# Patient Record
Sex: FEMALE | Race: WHITE | Employment: PART TIME | URBAN - METROPOLITAN AREA
[De-identification: names, ages, dates, MRNs, and addresses within clinical notes are randomized per-mention and may not be internally consistent; named-entity substitution may affect disease eponyms.]

---

## 2017-03-27 ENCOUNTER — OFFICE VISIT (OUTPATIENT)
Dept: HEMATOLOGY | Age: 57
End: 2017-03-27

## 2017-03-27 VITALS
OXYGEN SATURATION: 98 % | DIASTOLIC BLOOD PRESSURE: 97 MMHG | HEART RATE: 84 BPM | TEMPERATURE: 99.6 F | BODY MASS INDEX: 27.53 KG/M2 | SYSTOLIC BLOOD PRESSURE: 142 MMHG | WEIGHT: 186.4 LBS

## 2017-03-27 DIAGNOSIS — K75.81 NASH (NONALCOHOLIC STEATOHEPATITIS): Primary | ICD-10-CM

## 2017-03-27 DIAGNOSIS — L28.2 PRURITIC RASH: ICD-10-CM

## 2017-03-27 RX ORDER — TRIAMCINOLONE ACETONIDE 1 MG/G
OINTMENT TOPICAL 2 TIMES DAILY
Qty: 80 G | Refills: 5 | Status: SHIPPED | OUTPATIENT
Start: 2017-03-27 | End: 2022-07-12

## 2017-03-27 NOTE — PROGRESS NOTES
[address] Butler MD, JOSE RAUL Richard PA-C Ralston Mates, MD, FACP, MD Batool Williamson NP [place], NP        [address] [facility], [address] [facility], [address]     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 The active problem list, all pertinent past medical history, medications, liver histology, endoscopic studies, radiologic findings and laboratory findings related to the liver disorder were reviewed with the patient. The patient is a pleasant 64 y.o.  female who was treated with minocycline for Roscaea. She developed a rash in 2013. She has been treated with prednisone on and off which was effective. In July 2015, she was slowly weaned off of prednisone and initially did well. In August 2016, her pruritus and UE rash recurred. She had prednisone at home and started taking that which calmed down her symptoms. She is currently off of prednisone but uses topical steroids to treat the affected areas. This has improved her symptoms but not completely resolved them. She was screened for a Con[employer] clinical trial utilizing a caspace inhibitor. She screened failed due to leukopenia.      She has improved her diet and is now following the Weight Watchers program. She has lost 10 lbs in the last 6 months. The patient overall feels well and voices no complaints except for the intermittent rash with pruritus described above. The patient completes all daily activities without any functional limitations. The patient has not experienced fatigue, arthralgias, myalgias, problems concentrating, swelling of the abdomen, swelling of the lower extremities, hematemesis, and hematochezia. ALLERGIES  Allergies   Allergen Reactions    Codeine Nausea and Vomiting    Sulfa (Sulfonamide Antibiotics) Rash     MEDICATIONS  No current outpatient prescriptions on file. No current facility-administered medications for this visit. REVIEW OF SYSTEMS:  Systems related to all organ systems were reviewed. All were negative except as noted above. FAMILY HISTORY:  The father  of cirrhosis. Unclear etiology. The mother  of heart disease. There is no other family history of liver disease. Aunts and uncleas with features of metabolic syndrome. SOCIAL HISTORY:  The patient is . The patient has 2 children and 3 grandchildren. The patient stopped using tobacco products in . The patient consumes 1-2 alcoholic beverages per 1-2 days. She has consumed minimal alcohol since 2013. The patient currently works full time . PHYSICAL EXAMINATION:  Visit Vitals    BP (!) 142/97    Pulse 84    Temp 99.6 °F (37.6 °C) (Tympanic)    Wt 186 lb 6.4 oz (84.6 kg)    SpO2 98%    BMI 27.53 kg/m2     General: No acute distress. Eyes: Sclera anicteric. ENT: No oral lesions. Thyroid normal.  Nodes: No adenopathy. Skin: No spider angiomata. No jaundice. No palmar erythema. Respiratory: Lungs clear to auscultation. Cardiovascular: Regular heart rate. No murmurs. No JVD. Abdomen: Soft non-tender. Liver size normal to percussion/palpation. Spleen not palpable. No obvious ascites. Extremities: No edema.   No muscle wasting. No gross arthritic changes. Neurologic: Alert and oriented. Cranial nerves grossly intact. No asterixsis. LABORATORY STUDIES:  [facility] [place] of [address] [place] [address] & Units 3/27/2017 9/29/2016 7/28/2015   WBC 3.4 - 10.8 x10E3/uL  2.6 (L) 3.2 (L)   ANC 1.4 - 7.0 x10E3/uL      HGB 11.1 - 15.9 g/dL  13.8 14.3    - 379 x10E3/uL  99 (LL) 131 (L)   INR 0.8 - 1.2  1.1    AST 0 - 40 IU/L 117 (H) 173 (H) 89 (H)   ALT 0 - 32 IU/L 125 (H) 220 (H) 129 (H)   Alk Phos 39 - 117 IU/L 106 66 75   Bili, Total 0.0 - 1.2 mg/dL 1.0 0.8 0.7   Bili, Direct 0.00 - 0.40 mg/dL   0.22   Albumin 3.5 - 5.5 g/dL 4.4 4.3 4.4   BUN 6 - 24 mg/dL 11 10 17   Creat 0.57 - 1.00 mg/dL 0.89 0.84 0.96   Na 134 - 144 mmol/L 140 141 144   K 3.5 - 5.2 mmol/L 3.8 3.6 3.7   Cl 96 - 106 mmol/L 97 98 102   CO2 18 - 29 mmol/L 23 26 26   Glucose 65 - 99 mg/dL 97 118 (H) 102 (H)     SEROLOGIES:  5/2013. ceruloplsmin 25, alpha-1-antitrypsin 145. Serologies Latest Ref Rng 10/2/2014   Ferritin 15 - 150 ng/mL 527 (H)   Iron % Saturation 15 - 55 % 25   RAJ, IFA  Negative   ASMCA 0 - 19 Units 9     LIVER HISTOLOGY:  5/2013. Slides reviewed by MLS. WILD with severe inflammation and stage 2 fibrosis. The pattern of the inflammation is not consistent with AIH.  11/2014. Slides reviewed by MLS. WILD with portal, septal and pericellular fibrosis. ENDOSCOPIC PROCEDURES:  Not available or performed    RADIOLOGY:  Not available or performed    OTHER TESTING:  Not available or performed    ASSESSMENT AND PLAN:  WILD with stage 2 fibrosis. Persistent elevation in liver transaminases, secondary to WILD. Liver function is normal. Discussed with patient participation in one of our clinical trials that is currently enrolling for WILD patients without cirrhosis. She would be a good candidate for this. She is interested. Her name will be given to our research team, consent was given today for Jay. Will continue to follow regularly. The patient was counseled regarding diet and exercise to achieve weight loss. She is now participating in [employer] and this has been effective. Encouraged patient to continue this program.     Rash with pruritus. Prescribed triamcinolone ointment to be taken as needed for UE rash. Discussed with patient that this is most likely NOT related to her fatty liver disease. The patient was directed to continue all current medications at the current dosages. There are no contraindications for the patient to take any medications that are necessary for treatment of other medical issues. The patient was counseled regarding alcohol consumption.     Follow-up Jamaal Sanchez 32 in 6 months or enrollment in a WILD clinical trial.    April Byron Suarez NP  Liver [place] [address]  Ph: [phone]  Fax: [phone]

## 2017-03-27 NOTE — MR AVS SNAPSHOT
Visit Information Date & Time Provider Department Dept. Phone Encounter #  
 3/27/2017 11:00 AM April GISELA Ray NP [facility] [address] [place] [address] [id number] Follow-up Instructions Return in about 6 months (around 9/27/2017). Upcoming Health Maintenance Date Due Hepatitis C Screening 1960 DTaP/Tdap/Td series (1 - Tdap) 6/29/1981 PAP AKA CERVICAL CYTOLOGY 6/29/1981 BREAST CANCER SCRN MAMMOGRAM 6/29/2010 FOBT Q 1 YEAR AGE 50-75 6/29/2010 INFLUENZA AGE 9 TO ADULT 8/1/2016 Allergies as of 3/27/2017  Review Complete On: 3/27/2017 By: April Kala Rothman NP Severity Noted Reaction Type Reactions Codeine  10/02/2014    Nausea and Vomiting  
 Sulfa (Sulfonamide Antibiotics)  10/02/2014    Rash Current Immunizations  Never Reviewed No immunizations on file. Not reviewed this visit You Were Diagnosed With   
  
 Codes Comments WILD (nonalcoholic steatohepatitis)    -  Primary ICD-10-CM: C58.68 ICD-9-CM: 571.8 Vitals BP Pulse Temp Weight(growth percentile) SpO2 BMI  
 (!) 142/97 84 99.6 °F (37.6 °C) (Tympanic) 186 lb 6.4 oz (84.6 kg) 98% 27.53 kg/m2 OB Status Smoking Status Menopause Never Smoker BMI and BSA Data Body Mass Index Body Surface Area  
 27.53 kg/m 2 2.03 m 2 Preferred Pharmacy Pharmacy Name Phone [address] [phone] Your Updated Medication List  
  
   
This list is accurate as of: 3/27/17 11:32 AM.  Always use your most recent med list.  
  
  
  
  
 triamcinolone acetonide 0.1 % ointment Commonly known as:  KENALOG Apply  to affected area two (2) times a day. use thin layer Prescriptions Sent to Pharmacy Refills  
 triamcinolone acetonide (KENALOG) 0.1 % ointment 5 Sig: Apply  to affected area two (2) times a day. use thin layer  Class: Normal  
 Pharmacy: [address] #: [phone] Route: Topical  
  
We Performed the Following CBC W/O DIFF [57429 CPT(R)] METABOLIC PANEL, COMPREHENSIVE [54951 CPT(R)] Follow-up Instructions Return in about 6 months (around 9/27/2017). Introducing [facility] HEALTH SERVICES! Dear Leatha: Thank you for requesting a ArchiveSocial account. Our records indicate that you already have an active ArchiveSocial account. You can access your account anytime at [email or web address] Did you know that you can access your hospital and ER discharge instructions at any time in ArchiveSocial? You can also review all of your test results from your hospital stay or ER visit. Additional Information If you have questions, please visit the Frequently Asked Questions section of the ArchiveSocial website at [email or web address] Remember, ArchiveSocial is NOT to be used for urgent needs. For medical emergencies, dial 911. Now available from your iPhone and Android! Please provide this summary of care documentation to your next provider. Your primary care clinician is listed as Tiana Benson. If you have any questions after today's visit, please call [phone].

## 2017-03-28 LAB
ALBUMIN SERPL-MCNC: 4.4 G/DL (ref 3.5–5.5)
ALBUMIN/GLOB SERPL: 1.5 {RATIO} (ref 1.2–2.2)
ALP SERPL-CCNC: 106 IU/L (ref 39–117)
ALT SERPL-CCNC: 125 IU/L (ref 0–32)
AST SERPL-CCNC: 117 IU/L (ref 0–40)
BILIRUB SERPL-MCNC: 1 MG/DL (ref 0–1.2)
BUN SERPL-MCNC: 11 MG/DL (ref 6–24)
BUN/CREAT SERPL: 12 (ref 9–23)
CALCIUM SERPL-MCNC: 9.4 MG/DL (ref 8.7–10.2)
CHLORIDE SERPL-SCNC: 97 MMOL/L (ref 96–106)
CO2 SERPL-SCNC: 23 MMOL/L (ref 18–29)
CREAT SERPL-MCNC: 0.89 MG/DL (ref 0.57–1)
ERYTHROCYTE [DISTWIDTH] IN BLOOD BY AUTOMATED COUNT: 14.1 % (ref 12.3–15.4)
GLOBULIN SER CALC-MCNC: 2.9 G/DL (ref 1.5–4.5)
GLUCOSE SERPL-MCNC: 97 MG/DL (ref 65–99)
HCT VFR BLD AUTO: 45.6 % (ref 34–46.6)
HGB BLD-MCNC: 15.3 G/DL (ref 11.1–15.9)
MCH RBC QN AUTO: 32.8 PG (ref 26.6–33)
MCHC RBC AUTO-ENTMCNC: 33.6 G/DL (ref 31.5–35.7)
MCV RBC AUTO: 98 FL (ref 79–97)
MORPHOLOGY BLD-IMP: ABNORMAL
NRBC BLD AUTO-RTO: 0 %
PLATELET # BLD AUTO: 93 X10E3/UL (ref 150–379)
POTASSIUM SERPL-SCNC: 3.8 MMOL/L (ref 3.5–5.2)
PROT SERPL-MCNC: 7.3 G/DL (ref 6–8.5)
RBC # BLD AUTO: 4.66 X10E6/UL (ref 3.77–5.28)
SODIUM SERPL-SCNC: 140 MMOL/L (ref 134–144)
WBC # BLD AUTO: 1.8 X10E3/UL (ref 3.4–10.8)

## 2017-09-25 ENCOUNTER — OFFICE VISIT (OUTPATIENT)
Dept: HEMATOLOGY | Age: 57
End: 2017-09-25

## 2017-09-25 VITALS
OXYGEN SATURATION: 99 % | SYSTOLIC BLOOD PRESSURE: 134 MMHG | WEIGHT: 182.6 LBS | TEMPERATURE: 98.1 F | DIASTOLIC BLOOD PRESSURE: 78 MMHG | BODY MASS INDEX: 26.97 KG/M2 | HEART RATE: 79 BPM

## 2017-09-25 DIAGNOSIS — K75.81 NASH (NONALCOHOLIC STEATOHEPATITIS): Primary | ICD-10-CM

## 2017-09-25 RX ORDER — TRIAMCINOLONE ACETONIDE 1 MG/G
CREAM TOPICAL 2 TIMES DAILY
Qty: 80 G | Refills: 3 | Status: SHIPPED | OUTPATIENT
Start: 2017-09-25 | End: 2022-01-27 | Stop reason: SDUPTHER

## 2017-09-25 NOTE — MR AVS SNAPSHOT
Visit Information Date & Time Provider Department Dept. Phone Encounter #  
 9/25/2017  9:30 AM April G Huyen Ahuja, [facility] 219 [id number] Upcoming Health Maintenance Date Due Hepatitis C Screening 1960 DTaP/Tdap/Td series (1 - Tdap) 6/29/1981 PAP AKA CERVICAL CYTOLOGY 6/29/1981 BREAST CANCER SCRN MAMMOGRAM 6/29/2010 FOBT Q 1 YEAR AGE 50-75 6/29/2010 INFLUENZA AGE 9 TO ADULT 8/1/2017 Allergies as of 9/25/2017  Review Complete On: 9/25/2017 By: Chris Amezcua Severity Noted Reaction Type Reactions Codeine  10/02/2014    Nausea and Vomiting  
 Sulfa (Sulfonamide Antibiotics)  10/02/2014    Rash Current Immunizations  Never Reviewed No immunizations on file. Not reviewed this visit You Were Diagnosed With   
  
 Codes Comments WILD (nonalcoholic steatohepatitis)    -  Primary ICD-10-CM: N41.09 ICD-9-CM: 571.8 Vitals BP Pulse Temp Weight(growth percentile) SpO2 BMI  
 134/78 79 98.1 °F (36.7 °C) (Tympanic) 182 lb 9.6 oz (82.8 kg) 99% 26.97 kg/m2 OB Status Smoking Status Menopause Never Smoker BMI and BSA Data Body Mass Index Body Surface Area  
 26.97 kg/m 2 2.01 m 2 Preferred Pharmacy Pharmacy Name Phone [address] [phone] Your Updated Medication List  
  
   
This list is accurate as of: 9/25/17 10:09 AM.  Always use your most recent med list.  
  
  
  
  
 * triamcinolone acetonide 0.1 % ointment Commonly known as:  KENALOG Apply  to affected area two (2) times a day. use thin layer * triamcinolone acetonide 0.1 % topical cream  
Commonly known as:  KENALOG Apply  to affected area two (2) times a day. use thin layer * Notice: This list has 2 medication(s) that are the same as other medications prescribed for you.  Read the directions carefully, and ask your doctor or other care provider to review them with you. Prescriptions Sent to Pharmacy Refills  
 triamcinolone acetonide (KENALOG) 0.1 % topical cream 3 Sig: Apply  to affected area two (2) times a day. use thin layer Class: Normal  
 Pharmacy: [address] #: [phone] Route: Topical  
  
We Performed the Following CBC W/O DIFF [58276 CPT(R)] METABOLIC PANEL, COMPREHENSIVE [48534 CPT(R)] To-Do List   
 09/25/2017 Imaging:  [facility] SERVICES! Dear Merryl Kawasaki: Thank you for requesting a Switchcam account. Our records indicate that you already have an active Switchcam account. You can access your account anytime at [email or web address] Did you know that you can access your hospital and ER discharge instructions at any time in Switchcam? You can also review all of your test results from your hospital stay or ER visit. Additional Information If you have questions, please visit the Frequently Asked Questions section of the Switchcam website at [email or web address] Remember, Switchcam is NOT to be used for urgent needs. For medical emergencies, dial 911. Now available from your iPhone and Android! Please provide this summary of care documentation to your next provider. Your primary care clinician is listed as Bambi Massey. If you have any questions after today's visit, please call [phone].

## 2017-09-26 LAB
ALBUMIN SERPL-MCNC: 4.3 G/DL (ref 3.5–5.5)
ALBUMIN/GLOB SERPL: 1.3 {RATIO} (ref 1.2–2.2)
ALP SERPL-CCNC: 103 IU/L (ref 39–117)
ALT SERPL-CCNC: 115 IU/L (ref 0–32)
AST SERPL-CCNC: 120 IU/L (ref 0–40)
BILIRUB SERPL-MCNC: 1.1 MG/DL (ref 0–1.2)
BUN SERPL-MCNC: 11 MG/DL (ref 6–24)
BUN/CREAT SERPL: 11 (ref 9–23)
CALCIUM SERPL-MCNC: 9.3 MG/DL (ref 8.7–10.2)
CHLORIDE SERPL-SCNC: 98 MMOL/L (ref 96–106)
CO2 SERPL-SCNC: 27 MMOL/L (ref 18–29)
CREAT SERPL-MCNC: 0.96 MG/DL (ref 0.57–1)
ERYTHROCYTE [DISTWIDTH] IN BLOOD BY AUTOMATED COUNT: 14.1 % (ref 12.3–15.4)
GLOBULIN SER CALC-MCNC: 3.3 G/DL (ref 1.5–4.5)
GLUCOSE SERPL-MCNC: 113 MG/DL (ref 65–99)
HCT VFR BLD AUTO: 42.2 % (ref 34–46.6)
HGB BLD-MCNC: 14.2 G/DL (ref 11.1–15.9)
MCH RBC QN AUTO: 32.6 PG (ref 26.6–33)
MCHC RBC AUTO-ENTMCNC: 33.6 G/DL (ref 31.5–35.7)
MCV RBC AUTO: 97 FL (ref 79–97)
PLATELET # BLD AUTO: 101 X10E3/UL (ref 150–379)
POTASSIUM SERPL-SCNC: 3.8 MMOL/L (ref 3.5–5.2)
PROT SERPL-MCNC: 7.6 G/DL (ref 6–8.5)
RBC # BLD AUTO: 4.35 X10E6/UL (ref 3.77–5.28)
SODIUM SERPL-SCNC: 140 MMOL/L (ref 134–144)
WBC # BLD AUTO: 1.6 X10E3/UL (ref 3.4–10.8)

## 2017-09-27 NOTE — PROGRESS NOTES
[address] Sher[address] Varma MD, [facility], [place], [place]       Crystal Elio, NP    TUNDE Seymour ACNP-BC   JOSE RAUL Larson NP        at [address], [address] [facility]     [facility], [address]     [phone]     FAX: [phone]    at [address], [address] [place] [address], [address]     [phone]     FAX: [phone]     Patient Care Team:  Deneen Reno MD as PCP - General (Family Practice)  Octavio Pak MD (Gastroenterology)  Magdy Marino MD (Hepatology)    Patient Active Problem List   Diagnosis Code    WILD (nonalcoholic steatohepatitis) K75.81    H/O cervical spine surgery Z98.890    H/O arthroscopic knee surgery Z98.890       Deja Segura [facility] to the [facility] for management of 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 The active problem list, all pertinent past medical history, medications, liver histology, endoscopic studies, radiologic findings and laboratory findings related to the liver disorder were reviewed with the patient. The patient is a pleasant 62 y.o.  female who was treated with minocycline for Roscaea. She developed a rash in 2013. She has been treated with prednisone on and off which was effective. In July 2015, she was slowly weaned off of prednisone and initially did well. In August 2016, her pruritus and UE rash recurred. She had prednisone at home and started taking that which calmed down her symptoms. She is currently off of prednisone but uses topical steroids to treat the affected areas (scalp, arms). This controls her symptoms for the most part. She was screened for a Conatus clinical trial utilizing a caspace inhibitor. She screened failed due to leukopenia.      She has improved her diet and is now following the Weight Watchers program and low carb diet. She continues to lose weight over time. The patient overall feels well and voices no complaints except for the intermittent rash with pruritus described above. The patient completes all daily activities without any functional limitations. The patient has not experienced fatigue, arthralgias, myalgias, problems concentrating, swelling of the abdomen, swelling of the lower extremities, hematemesis, and hematochezia. ALLERGIES  Allergies   Allergen Reactions    Codeine Nausea and Vomiting    Sulfa (Sulfonamide Antibiotics) Rash     MEDICATIONS  Current Outpatient Prescriptions   Medication Sig    triamcinolone acetonide (KENALOG) 0.1 % topical cream Apply  to affected area two (2) times a day. use thin layer    triamcinolone acetonide (KENALOG) 0.1 % ointment Apply  to affected area two (2) times a day. use thin layer     No current facility-administered medications for this visit. REVIEW OF SYSTEMS:  Systems related to all organ systems were reviewed. All were negative except as noted above. FAMILY HISTORY:  The father  of cirrhosis. Unclear etiology. The mother  of heart disease. There is no other family history of liver disease. Aunts and uncleas with features of metabolic syndrome. SOCIAL HISTORY:  The patient is . The patient has 2 children and 3 grandchildren. The patient stopped using tobacco products in . The patient consumes 1-2 alcoholic beverages weekly. She has consumed minimal alcohol since 2013. The patient currently works full time . PHYSICAL EXAMINATION:  Visit Vitals    /78    Pulse 79    Temp 98.1 °F (36.7 °C) (Tympanic)    Wt 182 lb 9.6 oz (82.8 kg)    SpO2 99%    BMI 26.97 kg/m2     General: No acute distress. Eyes: Sclera anicteric. ENT: No oral lesions. Thyroid normal.  Nodes: No adenopathy. Skin: No spider angiomata. No jaundice. No palmar erythema.   Respiratory: Lungs clear to auscultation. Cardiovascular: Regular heart rate. No murmurs. No JVD. Abdomen: Soft non-tender. Liver size normal to percussion/palpation. Spleen not palpable. No obvious ascites. Extremities: No edema. No muscle wasting. No gross arthritic changes. Neurologic: Alert and oriented. Cranial nerves grossly intact. No asterixsis. LABORATORY STUDIES:  Liver [place] of [address] [place] [address] & Units 9/25/2017 3/27/2017 9/29/2016   WBC 3.4 - 10.8 x10E3/uL 1.6 (LL) 1.8 (LL) 2.6 (L)   ANC 1.4 - 7.0 x10E3/uL      HGB 11.1 - 15.9 g/dL 14.2 15.3 13.8    - 379 x10E3/uL 101 (L) 93 (LL) 99 (LL)   INR 0.8 - 1.2   1.1   AST 0 - 40 IU/L 120 (H) 117 (H) 173 (H)   ALT 0 - 32 IU/L 115 (H) 125 (H) 220 (H)   Alk Phos 39 - 117 IU/L 103 106 66   Bili, Total 0.0 - 1.2 mg/dL 1.1 1.0 0.8   Bili, Direct 0.00 - 0.40 mg/dL      Albumin 3.5 - 5.5 g/dL 4.3 4.4 4.3   BUN 6 - 24 mg/dL 11 11 10   Creat 0.57 - 1.00 mg/dL 0.96 0.89 0.84   Na 134 - 144 mmol/L 140 140 141   K 3.5 - 5.2 mmol/L 3.8 3.8 3.6   Cl 96 - 106 mmol/L 98 97 98   CO2 18 - 29 mmol/L 27 23 26   Glucose 65 - 99 mg/dL 113 (H) 97 118 (H)     SEROLOGIES:  5/2013. ceruloplsmin 25, alpha-1-antitrypsin 145. Serologies Latest Ref Rng 10/2/2014   Ferritin 15 - 150 ng/mL 527 (H)   Iron % Saturation 15 - 55 % 25   RAJ, IFA  Negative   ASMCA 0 - 19 Units 9     LIVER HISTOLOGY:  5/2013. Slides reviewed by MLS. WILD with severe inflammation and stage 2 fibrosis. The pattern of the inflammation is not consistent with AIH.  11/2014. Slides reviewed by GREGORIO. WILD with portal, septal and pericellular fibrosis. ENDOSCOPIC PROCEDURES:  Not available or performed    RADIOLOGY:  Not available or performed    OTHER TESTING:  Not available or performed    ASSESSMENT AND PLAN:  WILD with stage 2 fibrosis. Persistent elevation in liver transaminases, secondary to WILD.  Liver function is normal. Discussed with patient participation in one of our clinical trials that is currently enrolling for WILD patients without cirrhosis. She would be a good candidate for this. She is interested. Her name will be given to our research team. Will continue to follow regularly. The patient was counseled regarding diet and exercise to achieve weight loss. She is now participating in [employer] and this has been effective. Encouraged patient to continue this program.     Rash with pruritus. Prescribed triamcinolone ointment and cream to be taken as needed for UE and scalp rash. Discussed with patient that this is most likely NOT related to her fatty liver disease. The patient was directed to continue all current medications at the current dosages. There are no contraindications for the patient to take any medications that are necessary for treatment of other medical issues. The patient was counseled regarding alcohol consumption.     Follow-up Jamaal Sanchez 32 in 6 months or enrollment in a WILD clinical trial.    April Charlie Kaplan NP  Liver [place] [address]  Ph: [phone]  Fax: [phone]

## 2017-10-02 ENCOUNTER — HOSPITAL ENCOUNTER (OUTPATIENT)
Dept: ULTRASOUND IMAGING | Age: 57
Discharge: HOME | End: 2017-10-02
Payer: COMMERCIAL

## 2017-10-02 DIAGNOSIS — K75.81 NASH (NONALCOHOLIC STEATOHEPATITIS): ICD-10-CM

## 2017-10-02 PROCEDURE — 76705 ECHO EXAM OF ABDOMEN: CPT

## 2020-02-14 ENCOUNTER — DOCUMENTATION ONLY (OUTPATIENT)
Dept: HEMATOLOGY | Age: 60
End: 2020-02-14

## 2020-02-14 NOTE — PROGRESS NOTES
Chart reviewed today for possible consideration in WILD clinical trials. Pt's last visit in the office was 9/25/2017. Voicemail left on patient's mobile number requesting return call to discuss opportunities in clinical trials. Awaiting a return call.

## 2022-01-27 ENCOUNTER — OFFICE VISIT (OUTPATIENT)
Dept: HEMATOLOGY | Age: 62
End: 2022-01-27
Payer: OTHER GOVERNMENT

## 2022-01-27 VITALS
WEIGHT: 199.8 LBS | OXYGEN SATURATION: 97 % | HEART RATE: 58 BPM | BODY MASS INDEX: 39.23 KG/M2 | HEIGHT: 60 IN | SYSTOLIC BLOOD PRESSURE: 152 MMHG | DIASTOLIC BLOOD PRESSURE: 82 MMHG

## 2022-01-27 DIAGNOSIS — K75.81 NASH (NONALCOHOLIC STEATOHEPATITIS): Primary | ICD-10-CM

## 2022-01-27 PROCEDURE — 91200 LIVER ELASTOGRAPHY: CPT | Performed by: PHYSICIAN ASSISTANT

## 2022-01-27 PROCEDURE — 99204 OFFICE O/P NEW MOD 45 MIN: CPT | Performed by: PHYSICIAN ASSISTANT

## 2022-01-27 NOTE — PROGRESS NOTES
Anahi Sorto is a 64 y.o. female  Chief Complaint   Patient presents with    New Patient     low platlets  having knee surgery next month

## 2022-01-27 NOTE — PROGRESS NOTES
[facility], See LEBLANC, Josh Simpson, WyTUNDE Junior, [facility]     April S Rodrigo, [facility]   KATI Lora-ADI Burrell, [facility]       Rociotobias Sotelo Myke De Tao 136    at [address], [facility] Collin Lopez  22.    [phone]    FAX: [address] [place] [address] Drive, [address] [place] [address] Street, [address]    [phone]    FAX: [phone]       Patient Care Team:  Phillip Neely MD as PCP - General (Family Medicine)  Andra Paz MD (Gastroenterology)  Yasir Nieto MD (Hepatology)    Patient Active Problem List   Diagnosis Code    WILD (nonalcoholic steatohepatitis) K75.81    H/O cervical spine surgery Z98.890    H/O arthroscopic knee surgery Z98.890       Zakiya Kitchen returns to the [address] for management of 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 The active problem list, all pertinent past medical history, medications, liver histology, endoscopic studies, radiologic findings and laboratory findings related to the liver disorder were reviewed with the patient. This is the patient's first return office visit in 4 years. She had a change in insurance that prevented her from maintaining follow-up in this office for several years. The patient is a pleasant 64 y.o.  female with a long-standing history of elevated liver enzymes thought to be due to WILD. This was assessed with liver biopsy in 2014 and deemed stage 2 at that time. She originally presented due to wide-spread steroid responsive rash that was thought to be related to liver condition.  She had seem rheumatology at that the time of presentation and no underlying systemic etiology of the rash was identified, she has been treating this with topical steroid creams. She has had no dermatologic evaluation. She was screened for potential inclusion in WILD/Conatus clinical trial utilizing a caspace inhibitor. She screened failed due to leukopenia - this has been a long-standing finding. She returns now as she is being considered for knee replacement in mid 2022 with Dr Raudel Reddy. Ortho has asked for updated assessment of liver condition as her pre-op labs had shown pancytopenia. She has not had evaluation with hematology in the past.     She has intermittently followed Weight Watchers program and low carb diet. Her weight has fluctuated within 20#. She has had increased weight gainin the past 2-3 years that she has attributed to her mobility issues and COVID restrictions. The patient overall feels well and voices no complaints except for the intermittent rash with pruritus and joint pains as described above. The patient completes all daily activities with mild functional limitations. The patient has not experienced fatigue, arthralgias, myalgias, problems concentrating, swelling of the abdomen, swelling of the lower extremities, hematemesis, and hematochezia. ALLERGIES  Allergies   Allergen Reactions    Codeine Nausea and Vomiting    Sulfa (Sulfonamide Antibiotics) Rash     MEDICATIONS  Current Outpatient Medications   Medication Sig    triamcinolone acetonide (KENALOG) 0.1 % topical cream Apply  to affected area two (2) times a day. use thin layer    triamcinolone acetonide (KENALOG) 0.1 % ointment Apply  to affected area two (2) times a day. use thin layer     No current facility-administered medications for this visit. REVIEW OF SYSTEMS:  Systems related to all organ systems were reviewed. All were negative except as noted above. FAMILY HISTORY:  The father  of cirrhosis. Unclear etiology. The mother  of heart disease.     Aunts and uncles with features of metabolic syndrome. SOCIAL HISTORY:  The patient is . The patient has 2 children and 3 grandchildren. The patient stopped using tobacco products in 2003. The patient consumes 1-2 alcoholic beverages weekly. She has consumed minimal alcohol since 6/2013. The patient currently works full time . PHYSICAL EXAMINATION:  Visit Vitals  BP (!) 152/82 (BP 1 Location: Left upper arm, BP Patient Position: Sitting, BP Cuff Size: Adult)   Pulse (!) 58   Ht 5' (1.524 m)   Wt 199 lb 12.8 oz (90.6 kg)   SpO2 97%   BMI 39.02 kg/m²     General: No acute distress. Walks with some difficulty  Eyes: Sclera anicteric. ENT: No oral lesions. Thyroid normal.  Nodes: No adenopathy. Skin: No spider angiomata. No jaundice. No palmar erythema. Erythematous paques-like lesion on overlying left tibia. Involvement of elbows. Respiratory: Lungs clear to auscultation. Cardiovascular: Regular heart rate. No murmurs. No JVD. Abdomen: Soft non-tender. Liver size normal to percussion/palpation. Spleen not palpable. No obvious ascites. Extremities: No edema. No muscle wasting. Significant for right knee swelling. Neurologic: Alert and oriented. Cranial nerves grossly intact. No asterixsis.     LABORATORY STUDIES:  Liver [place] of [address] & Units 1/27/2022 9/25/2017   WBC 3.6 - 11.0 K/uL 2.5 (L) 1.6 (LL)   ANC 1.8 - 8.0 K/UL 1.2 (L)    HGB 11.5 - 16.0 g/dL 11.4 (L) 14.2    - 400 K/uL 85 (L) 101 (L)   INR 0.9 - 1.1   1.2 (H)    AST 15 - 37 U/L 85 (H) 120 (H)   ALT 12 - 78 U/L 73 115 (H)   Alk Phos 45 - 117 U/L 130 (H) 103   Bili, Total 0.2 - 1.0 MG/DL 0.8 1.1   Bili, Direct 0.0 - 0.2 MG/DL 0.3 (H)    Albumin 3.5 - 5.0 g/dL 4.0 4.3   BUN 6 - 20 MG/DL 14 11   Creat 0.55 - 1.02 MG/DL 0.88 0.96   Na 136 - 145 mmol/L 139 140   K 3.5 - 5.1 mmol/L 3.4 (L) 3.8   Cl 97 - 108 mmol/L 107 98   CO2 21 - 32 mmol/L 25 27   Glucose 65 - 100 mg/dL 96 113 (H)     Liver [place] [facility] Latest Ref Rng & Units 3/27/2017   WBC 3.6 - 11.0 K/uL 1.8 (LL)   ANC 1.8 - 8.0 K/UL    HGB 11.5 - 16.0 g/dL 15.3    - 400 K/uL 93 (LL)   INR 0.9 - 1.1      AST 15 - 37 U/L 117 (H)   ALT 12 - 78 U/L 125 (H)   Alk Phos 45 - 117 U/L 106   Bili, Total 0.2 - 1.0 MG/DL 1.0   Bili, Direct 0.0 - 0.2 MG/DL    Albumin 3.5 - 5.0 g/dL 4.4   BUN 6 - 20 MG/DL 11   Creat 0.55 - 1.02 MG/DL 0.89   Na 136 - 145 mmol/L 140   K 3.5 - 5.1 mmol/L 3.8   Cl 97 - 108 mmol/L 97   CO2 21 - 32 mmol/L 23   Glucose 65 - 100 mg/dL 97   Additional lab values drawn at today's office visit are pending at the time of documentation. SEROLOGIES:  Serologies Latest Ref Rng & Units 1/27/2022   Ferritin 8 - 252 NG/ML 16   Iron % Saturation 20 - 50 % 7 (L)   5/2013. ceruloplasmin 25, alpha-1-antitrypsin 145. Serologies Latest Ref Rng 10/2/2014   Ferritin 15 - 150 ng/mL 527 (H)   Iron % Saturation 15 - 55 % 25   RAJ, IFA  Negative   ASMCA 0 - 19 Units 9     LIVER HISTOLOGY:  5/2013. Slides reviewed by MLS. WILD with severe inflammation and stage 2 fibrosis. The pattern of the inflammation is not consistent with AIH.  11/2014. Slides reviewed by MLS. WILD with portal, septal and pericellular fibrosis. 1/2022. FibroScan performed at [address]. EkPa was 30.2. Suggested fibrosis level is F4. CAP score is 240. ENDOSCOPIC PROCEDURES:  Not available or performed    RADIOLOGY:  10/2017. Ultrasound of liver. Mildly increased liver echogenicity, otherwise normal ultrasound examination of the right upper quadrant. OTHER TESTING:  Not available or performed    ASSESSMENT AND PLAN:  WILD with probable cirrhosis. I have reviewed with the patient the results of the current Fibroscan and our concern that she likely has had progression to cirrhosis. This is likely due to WILD given the past evaluation negative for other etiologies of liver disease, the presence of steatosis on prior biopsies, and her persistent weight gain. I have reviewed her presentation with Dr Bia Carroll and he feels that liver biopsy would not change our management strategy at this time. Will proceed with screenings for complications of cirrhosis. The most recent laboratory studies indicate that the liver transaminases are elevated, alkaline phosphatase is elevated, total bilirubin is normal, INR is elevated, albumin is normal, and the platelet count is depressed. AFP is pending at this time. I have reviewed with the patient that we will need to assess for complications of cirrhosis. This will include ultrasound to assess for liver cancer and signs of portal hypertension. This has been ordered. I have also recommended that she have EGD to assess for varices or sources of GI bleeding. She is noted to have iron deficiency anemia. The patient was counseled regarding diet and exercise to achieve weight loss. She is recommitting to Weight Watchers, I have encouraged her in this regard. Rash with pruritus. The etiology of the skin changes is not clear. I have for now renewed prescription for triamcinolone cream to be taken as needed. We will want to connect her with dermatology for a more formal assessment of this steroid-responsive rash. Pancytopenia  She has had long-standing leukopenia dating back to at least 2014 and has not had prior heme evaluation. This should be considered. The patient's thrombocytopenia is likely due to splenic sequestration from portal hypertension related to her cirrhosis. I am concerned that she may have upper Gi blood losses from portal gastropathy and/or NSAIDs use as she is iron deficient. Will recommend oral iron supplementation at this time and investigate with EGD. Orthopedic surgery  I have reviewed with the patient my concerns that her liver disease is more advanced than previously believed and that there are unique concerns for surgical complications in patients with cirrhosis.  From her labs in office, she is determined to have Kimberly A cirrhosis, score 5, and a MELD score of under 10. The risk of complications including hepatic decompensation is about 20-30%. Operative mortality risk is under 5%. The ultimate decision regarding whether or not to proceed with surgery will depend upon conversations between the surgeon and patient/and/or family and careful assessment of the risk and benefit of the surgical procedure. We would like to proceed with assessments of US and EGD as promptly as possible, these may not be done in time prior to her scheduled knee surgery 2/17/22. Her surgeon may want to discuss deferring surgery. The patient was directed to continue all current medications at the current dosages and to limit her intake of NSAIDs as much as possible. The patient was counseled regarding alcohol consumption. [address] [place] [address] in 3 months for follow-up with US and EGD in the meantime. Documentation reviewed and updated to reflect current, accurate patient information.     Madie Riddle PA-C  Liver [place] [facility] 59, [facility] 22.  [phone]  [address] [place] [address]

## 2022-01-28 DIAGNOSIS — K75.81 NASH (NONALCOHOLIC STEATOHEPATITIS): Primary | ICD-10-CM

## 2022-01-28 LAB
ALBUMIN SERPL-MCNC: 4 G/DL (ref 3.5–5)
ALBUMIN/GLOB SERPL: 1.1 {RATIO} (ref 1.1–2.2)
ALP SERPL-CCNC: 130 U/L (ref 45–117)
ALT SERPL-CCNC: 73 U/L (ref 12–78)
ANION GAP SERPL CALC-SCNC: 7 MMOL/L (ref 5–15)
AST SERPL-CCNC: 85 U/L (ref 15–37)
BASOPHILS # BLD: 0 K/UL (ref 0–0.1)
BASOPHILS NFR BLD: 1 % (ref 0–1)
BILIRUB DIRECT SERPL-MCNC: 0.3 MG/DL (ref 0–0.2)
BILIRUB SERPL-MCNC: 0.8 MG/DL (ref 0.2–1)
BUN SERPL-MCNC: 14 MG/DL (ref 6–20)
BUN/CREAT SERPL: 16 (ref 12–20)
CALCIUM SERPL-MCNC: 9.5 MG/DL (ref 8.5–10.1)
CHLORIDE SERPL-SCNC: 107 MMOL/L (ref 97–108)
CO2 SERPL-SCNC: 25 MMOL/L (ref 21–32)
CREAT SERPL-MCNC: 0.88 MG/DL (ref 0.55–1.02)
DIFFERENTIAL METHOD BLD: ABNORMAL
EOSINOPHIL # BLD: 0.1 K/UL (ref 0–0.4)
EOSINOPHIL NFR BLD: 2 % (ref 0–7)
ERYTHROCYTE [DISTWIDTH] IN BLOOD BY AUTOMATED COUNT: 20.8 % (ref 11.5–14.5)
FERRITIN SERPL-MCNC: 16 NG/ML (ref 8–252)
GLOBULIN SER CALC-MCNC: 3.6 G/DL (ref 2–4)
GLUCOSE SERPL-MCNC: 96 MG/DL (ref 65–100)
HCT VFR BLD AUTO: 37.9 % (ref 35–47)
HGB BLD-MCNC: 11.4 G/DL (ref 11.5–16)
IMM GRANULOCYTES # BLD AUTO: 0 K/UL (ref 0–0.04)
IMM GRANULOCYTES NFR BLD AUTO: 0 % (ref 0–0.5)
INR PPP: 1.2 (ref 0.9–1.1)
IRON SATN MFR SERPL: 7 % (ref 20–50)
IRON SERPL-MCNC: 41 UG/DL (ref 35–150)
LYMPHOCYTES # BLD: 0.9 K/UL (ref 0.8–3.5)
LYMPHOCYTES NFR BLD: 35 % (ref 12–49)
MCH RBC QN AUTO: 25.2 PG (ref 26–34)
MCHC RBC AUTO-ENTMCNC: 30.1 G/DL (ref 30–36.5)
MCV RBC AUTO: 83.7 FL (ref 80–99)
MONOCYTES # BLD: 0.3 K/UL (ref 0–1)
MONOCYTES NFR BLD: 13 % (ref 5–13)
NEUTS SEG # BLD: 1.2 K/UL (ref 1.8–8)
NEUTS SEG NFR BLD: 49 % (ref 32–75)
NRBC # BLD: 0 K/UL (ref 0–0.01)
NRBC BLD-RTO: 0 PER 100 WBC
PLATELET # BLD AUTO: 85 K/UL (ref 150–400)
PMV BLD AUTO: ABNORMAL FL (ref 8.9–12.9)
POTASSIUM SERPL-SCNC: 3.4 MMOL/L (ref 3.5–5.1)
PROT SERPL-MCNC: 7.6 G/DL (ref 6.4–8.2)
PROTHROMBIN TIME: 12 SEC (ref 9–11.1)
RBC # BLD AUTO: 4.53 M/UL (ref 3.8–5.2)
RBC MORPH BLD: ABNORMAL
SODIUM SERPL-SCNC: 139 MMOL/L (ref 136–145)
TIBC SERPL-MCNC: 576 UG/DL (ref 250–450)
WBC # BLD AUTO: 2.5 K/UL (ref 3.6–11)

## 2022-01-28 RX ORDER — TRIAMCINOLONE ACETONIDE 1 MG/G
CREAM TOPICAL 2 TIMES DAILY
Qty: 80 G | Refills: 3 | Status: SHIPPED
Start: 2022-01-28 | End: 2022-07-12

## 2022-01-28 NOTE — PROGRESS NOTES
Pt notified of labs and concern for progression of disease to cirrhosis. Proceed with US and EGD as discussed. Recommended start of oral iron supplementation for management of Fe-defic anemia and EGD.

## 2022-01-29 LAB
A2 MACROGLOB SERPL-MCNC: 345 MG/DL (ref 110–276)
ALT SERPL W P-5'-P-CCNC: 69 IU/L (ref 0–40)
APO A-I SERPL-MCNC: 100 MG/DL (ref 116–209)
AST SERPL W P-5'-P-CCNC: 88 IU/L (ref 0–40)
BILIRUB SERPL-MCNC: 0.5 MG/DL (ref 0–1.2)
CHOLEST SERPL-MCNC: 94 MG/DL (ref 100–199)
COMMENT:: ABNORMAL
FIBROSIS SCORING:, 550107: ABNORMAL
FIBROSIS STAGE SERPL QL: ABNORMAL
GGT SERPL-CCNC: 98 IU/L (ref 0–60)
GLUCOSE SERPL-MCNC: 95 MG/DL (ref 65–99)
HAPTOGLOB SERPL-MCNC: 73 MG/DL (ref 37–355)
INTERPRETATIONS:, 550143: ABNORMAL
LIVER FIBR SCORE SERPL CALC.FIBROSURE: 0.78 (ref 0–0.21)
NASH SCORING, 550144: ABNORMAL
NECROINFLAMMATORY ACT GRADE SERPL QL: ABNORMAL
NECROINFLAMMATORY ACT SCORE SERPL: 0.75
SERVICE CMNT-IMP: ABNORMAL
STEATOSIS GRADE, 550153: ABNORMAL
STEATOSIS GRADING, 550189: ABNORMAL
STEATOSIS SCORE, 550149: 0.77 (ref 0–0.3)
TRIGL SERPL-MCNC: 126 MG/DL (ref 0–149)

## 2022-01-31 ENCOUNTER — HOSPITAL ENCOUNTER (OUTPATIENT)
Dept: ULTRASOUND IMAGING | Age: 62
Discharge: HOME | End: 2022-01-31
Attending: PHYSICIAN ASSISTANT
Payer: OTHER GOVERNMENT

## 2022-01-31 DIAGNOSIS — K75.81 NASH (NONALCOHOLIC STEATOHEPATITIS): ICD-10-CM

## 2022-01-31 LAB
AFP L3 MFR SERPL: 7.1 % (ref 0–9.9)
AFP SERPL-MCNC: 15.3 NG/ML (ref 0–8)

## 2022-01-31 PROCEDURE — 76700 US EXAM ABDOM COMPLETE: CPT

## 2022-02-02 NOTE — PROGRESS NOTES
Pt notified of concern for cirrhosis and need for initiation of iron supplementation. Patient scheduled for EGD in 2 wks. Ortho/knee surgery has been put on hold.

## 2022-02-10 ENCOUNTER — HOSPITAL ENCOUNTER (OUTPATIENT)
Dept: PREADMISSION TESTING | Age: 62
Discharge: HOME | End: 2022-02-10
Attending: INTERNAL MEDICINE
Payer: OTHER GOVERNMENT

## 2022-02-10 ENCOUNTER — TRANSCRIBE ORDER (OUTPATIENT)
Dept: REGISTRATION | Age: 62
End: 2022-02-10

## 2022-02-10 DIAGNOSIS — U07.1 COVID-19: ICD-10-CM

## 2022-02-10 DIAGNOSIS — U07.1 COVID-19: Primary | ICD-10-CM

## 2022-02-10 PROCEDURE — U0005 INFEC AGEN DETEC AMPLI PROBE: HCPCS

## 2022-02-11 LAB
SARS-COV-2, XPLCVT: NOT DETECTED
SOURCE, COVRS: NORMAL

## 2022-02-15 ENCOUNTER — HOSPITAL ENCOUNTER (OUTPATIENT)
Age: 62
Setting detail: OUTPATIENT SURGERY
Discharge: HOME | End: 2022-02-15
Attending: INTERNAL MEDICINE | Admitting: INTERNAL MEDICINE
Payer: OTHER GOVERNMENT

## 2022-02-15 ENCOUNTER — ANESTHESIA EVENT (OUTPATIENT)
Dept: ENDOSCOPY | Age: 62
End: 2022-02-15
Payer: OTHER GOVERNMENT

## 2022-02-15 ENCOUNTER — DOCUMENTATION ONLY (OUTPATIENT)
Dept: HEMATOLOGY | Age: 62
End: 2022-02-15

## 2022-02-15 ENCOUNTER — ANESTHESIA (OUTPATIENT)
Dept: ENDOSCOPY | Age: 62
End: 2022-02-15
Payer: OTHER GOVERNMENT

## 2022-02-15 VITALS
WEIGHT: 194 LBS | RESPIRATION RATE: 22 BRPM | BODY MASS INDEX: 29.4 KG/M2 | HEIGHT: 68 IN | HEART RATE: 84 BPM | SYSTOLIC BLOOD PRESSURE: 120 MMHG | DIASTOLIC BLOOD PRESSURE: 79 MMHG | TEMPERATURE: 97.3 F | OXYGEN SATURATION: 94 %

## 2022-02-15 DIAGNOSIS — K29.70 GASTRITIS WITHOUT BLEEDING, UNSPECIFIED CHRONICITY, UNSPECIFIED GASTRITIS TYPE: ICD-10-CM

## 2022-02-15 DIAGNOSIS — K76.6 PORTAL HYPERTENSIVE GASTROPATHY (HCC): ICD-10-CM

## 2022-02-15 DIAGNOSIS — K31.89 PORTAL HYPERTENSIVE GASTROPATHY (HCC): ICD-10-CM

## 2022-02-15 PROCEDURE — 43239 EGD BIOPSY SINGLE/MULTIPLE: CPT | Performed by: INTERNAL MEDICINE

## 2022-02-15 PROCEDURE — 76060000031 HC ANESTHESIA FIRST 0.5 HR: Performed by: INTERNAL MEDICINE

## 2022-02-15 PROCEDURE — 74011250636 HC RX REV CODE- 250/636: Performed by: NURSE ANESTHETIST, CERTIFIED REGISTERED

## 2022-02-15 PROCEDURE — 76040000019: Performed by: INTERNAL MEDICINE

## 2022-02-15 PROCEDURE — 88305 TISSUE EXAM BY PATHOLOGIST: CPT

## 2022-02-15 PROCEDURE — 2709999900 HC NON-CHARGEABLE SUPPLY: Performed by: INTERNAL MEDICINE

## 2022-02-15 PROCEDURE — 74011000250 HC RX REV CODE- 250: Performed by: NURSE ANESTHETIST, CERTIFIED REGISTERED

## 2022-02-15 PROCEDURE — 77030021593 HC FCPS BIOP ENDOSC BSC -A: Performed by: INTERNAL MEDICINE

## 2022-02-15 RX ORDER — SODIUM CHLORIDE 0.9 % (FLUSH) 0.9 %
5-40 SYRINGE (ML) INJECTION AS NEEDED
Status: DISCONTINUED | OUTPATIENT
Start: 2022-02-15 | End: 2022-02-15 | Stop reason: HOSPADM

## 2022-02-15 RX ORDER — NALOXONE HYDROCHLORIDE 0.4 MG/ML
0.4 INJECTION, SOLUTION INTRAMUSCULAR; INTRAVENOUS; SUBCUTANEOUS
Status: DISCONTINUED | OUTPATIENT
Start: 2022-02-15 | End: 2022-02-15 | Stop reason: HOSPADM

## 2022-02-15 RX ORDER — MIDAZOLAM HYDROCHLORIDE 1 MG/ML
5-10 INJECTION, SOLUTION INTRAMUSCULAR; INTRAVENOUS
Status: DISCONTINUED | OUTPATIENT
Start: 2022-02-15 | End: 2022-02-15 | Stop reason: HOSPADM

## 2022-02-15 RX ORDER — PROPOFOL 10 MG/ML
INJECTION, EMULSION INTRAVENOUS AS NEEDED
Status: DISCONTINUED | OUTPATIENT
Start: 2022-02-15 | End: 2022-02-15 | Stop reason: HOSPADM

## 2022-02-15 RX ORDER — ONDANSETRON 2 MG/ML
INJECTION INTRAMUSCULAR; INTRAVENOUS AS NEEDED
Status: DISCONTINUED | OUTPATIENT
Start: 2022-02-15 | End: 2022-02-15 | Stop reason: HOSPADM

## 2022-02-15 RX ORDER — SODIUM CHLORIDE, SODIUM LACTATE, POTASSIUM CHLORIDE, CALCIUM CHLORIDE 600; 310; 30; 20 MG/100ML; MG/100ML; MG/100ML; MG/100ML
INJECTION, SOLUTION INTRAVENOUS
Status: DISCONTINUED | OUTPATIENT
Start: 2022-02-15 | End: 2022-02-15 | Stop reason: HOSPADM

## 2022-02-15 RX ORDER — EPINEPHRINE 0.1 MG/ML
1 INJECTION INTRACARDIAC; INTRAVENOUS
Status: DISCONTINUED | OUTPATIENT
Start: 2022-02-15 | End: 2022-02-15 | Stop reason: HOSPADM

## 2022-02-15 RX ORDER — SODIUM CHLORIDE 0.9 % (FLUSH) 0.9 %
5-40 SYRINGE (ML) INJECTION EVERY 8 HOURS
Status: DISCONTINUED | OUTPATIENT
Start: 2022-02-15 | End: 2022-02-15 | Stop reason: HOSPADM

## 2022-02-15 RX ORDER — ATROPINE SULFATE 0.1 MG/ML
0.5 INJECTION INTRAVENOUS
Status: DISCONTINUED | OUTPATIENT
Start: 2022-02-15 | End: 2022-02-15 | Stop reason: HOSPADM

## 2022-02-15 RX ORDER — FENTANYL CITRATE 50 UG/ML
50-200 INJECTION, SOLUTION INTRAMUSCULAR; INTRAVENOUS
Status: DISCONTINUED | OUTPATIENT
Start: 2022-02-15 | End: 2022-02-15 | Stop reason: HOSPADM

## 2022-02-15 RX ORDER — LIDOCAINE HYDROCHLORIDE 20 MG/ML
INJECTION, SOLUTION EPIDURAL; INFILTRATION; INTRACAUDAL; PERINEURAL AS NEEDED
Status: DISCONTINUED | OUTPATIENT
Start: 2022-02-15 | End: 2022-02-15 | Stop reason: HOSPADM

## 2022-02-15 RX ORDER — FLUMAZENIL 0.1 MG/ML
0.2 INJECTION INTRAVENOUS
Status: DISCONTINUED | OUTPATIENT
Start: 2022-02-15 | End: 2022-02-15 | Stop reason: HOSPADM

## 2022-02-15 RX ORDER — DEXTROMETHORPHAN/PSEUDOEPHED 2.5-7.5/.8
1.2 DROPS ORAL
Status: DISCONTINUED | OUTPATIENT
Start: 2022-02-15 | End: 2022-02-15 | Stop reason: HOSPADM

## 2022-02-15 RX ORDER — SODIUM CHLORIDE 9 MG/ML
50 INJECTION, SOLUTION INTRAVENOUS CONTINUOUS
Status: DISCONTINUED | OUTPATIENT
Start: 2022-02-15 | End: 2022-02-15 | Stop reason: HOSPADM

## 2022-02-15 RX ADMIN — PROPOFOL 100 MG: 10 INJECTION, EMULSION INTRAVENOUS at 07:35

## 2022-02-15 RX ADMIN — PROPOFOL 20 MG: 10 INJECTION, EMULSION INTRAVENOUS at 07:36

## 2022-02-15 RX ADMIN — PROPOFOL 20 MG: 10 INJECTION, EMULSION INTRAVENOUS at 07:39

## 2022-02-15 RX ADMIN — PROPOFOL 20 MG: 10 INJECTION, EMULSION INTRAVENOUS at 07:37

## 2022-02-15 RX ADMIN — LIDOCAINE HYDROCHLORIDE 50 MG: 20 INJECTION, SOLUTION EPIDURAL; INFILTRATION; INTRACAUDAL; PERINEURAL at 07:34

## 2022-02-15 RX ADMIN — SODIUM CHLORIDE, SODIUM LACTATE, POTASSIUM CHLORIDE, AND CALCIUM CHLORIDE: 600; 310; 30; 20 INJECTION, SOLUTION INTRAVENOUS at 07:33

## 2022-02-15 RX ADMIN — PROPOFOL 25 MG: 10 INJECTION, EMULSION INTRAVENOUS at 07:42

## 2022-02-15 RX ADMIN — PROPOFOL 50 MG: 10 INJECTION, EMULSION INTRAVENOUS at 07:41

## 2022-02-15 RX ADMIN — PROPOFOL 20 MG: 10 INJECTION, EMULSION INTRAVENOUS at 07:38

## 2022-02-15 RX ADMIN — ONDANSETRON HYDROCHLORIDE 4 MG: 2 INJECTION, SOLUTION INTRAMUSCULAR; INTRAVENOUS at 07:33

## 2022-02-15 NOTE — ANESTHESIA PREPROCEDURE EVALUATION
Relevant Problems   GASTROINTESTINAL   (+) WILD (nonalcoholic steatohepatitis)       Anesthetic History   No history of anesthetic complications            Review of Systems / Medical History  Patient summary reviewed, nursing notes reviewed and pertinent labs reviewed    Pulmonary  Within defined limits                 Neuro/Psych   Within defined limits           Cardiovascular  Within defined limits                Exercise tolerance: >4 METS     GI/Hepatic/Renal  Within defined limits         Liver disease     Endo/Other  Within defined limits           Other Findings              Physical Exam    Airway  Mallampati: II  TM Distance: 4 - 6 cm  Neck ROM: normal range of motion   Mouth opening: Normal     Cardiovascular  Regular rate and rhythm,  S1 and S2 normal,  no murmur, click, rub, or gallop             Dental  No notable dental hx       Pulmonary  Breath sounds clear to auscultation               Abdominal  GI exam deferred       Other Findings            Anesthetic Plan    ASA: 2  Anesthesia type: MAC          Induction: Intravenous  Anesthetic plan and risks discussed with: Patient

## 2022-02-15 NOTE — PROGRESS NOTES
[address] [facility], See LEBLANC, Josh Simpson, [place]      TUNDE Giraldo, [facility]     Danae Wallace, [facility]   Carmela Marie FLEX Torres, [facility]       Rocio Sotelo Myke De Tao 136    at [address], [address] [place] [address], Collin  22.    [phone]    FAX: [address] [place] [address] Avenue    [address], [address]    [phone]    FAX: [phone]       HEPATOLOGY NOTE    The patient is in need of hip replacement surgery. She has seen Dr Omar Graves who wanted us to weight in regarding her risk of hepatic decompensation following hip replacement. The patient underwent EGD today as part of this risk assessment. This demonstrated no esophageal varices, mild portal hypertensive gastropathy and mild gastritis. The patient has cirrhosis Child class A and MELD of under 10. The risk of complications including hepatic decompensation is about 20-30%. Operative mortality risk is under 5%. There is no risk of post-operative variceal bleeding since there are no varices. The platelet count is currently 85,000 and is adequate for surgery. If the surgeon wishes this to be higher we can increase the platelet count to about 170, 000 using avatrombopag. This is administered for 5 consecutive days starting 9 days prior to the date of surgery. This regimen typically double the platelet count by the day of surgery. The ultimate decision regarding whether or not to proceed with surgery will depend upon conversations between the surgeon and patient/and/or family and careful assessment of the risk and benefit of the surgical procedure.       Antony Baez MD  [facility] of [address] Selena Baca, [place]  22.  [address] [facility]

## 2022-02-15 NOTE — DISCHARGE INSTRUCTIONS
[address] [facility] [place], MD, [place], Cite Edwin Calvin, [place]      [place] TUNDE Jurado, [facility]-BC     Danae Wallace, [facility]   Maxim Nuñez P-C Gean Cheadle, [facility]       Rocio Viviandestin [facility] 136    at [address]    [address] [place] [address], [facility] Collin Lopez  22.    [phone]    FAX: [address] [place] [address] Avenue    [address], [address]    [phone]    FAX: [phone]         ENDOSCOPY DISCHARGE INSTRUCTIONS      Carol Kemp  1960  Date: 2/15/2022    DISCOMFORT:  Use lozenges or warm salt water gargle for sore thoat  Apply warm compress to IV site if red. If redness or soreness persists call the office. You may experience gas and bloating. Walking and belching will help relieve this. You may experience chest discomfort or pressure especially if banding of esophageal varices was performed. DIET:  You may resume your normal diet. ACTIVITY:  Spend the remainder of the day resting. Avoid any strenuous activity. You may not operate a vehicle for 12 hours. You may not engage in an occupation involving machinery or appliances for rest of today. Avoid making any critical or financial decisions for at least 24 hour. Call the [employer] [address] if you have any of the following:  Increasing chest or abdominal pain, nausea, vomiting, vomiting blood, abdominal distension or swelling, fever or chills, bloody discharge from nose or mouth or shortness of breath. Follow-up Instructions:  Call Dr. Guzman Thompson for any questions or problems at the phone number listed above. If a biopsy was performed, you will be contacted by the office staff or Dr Guzman Thompson within 1 week.    If you have not heard from us by then you may call the office at the phone number listed above to inquire about the results. ENDOSCOPY FINDINGS:  Your endoscopy demonstrated mild swelling of the stomach. Will repeat EGD to look for development of varices in 3 years. Keep follow-up appointment with Jh Moreladn on 4/25/2022. DISCHARGE SUMMARY from the Nurse: The following personal items collected during your admission are returned to you:   Dental Appliance: Dental Appliances: None  Vision: Visual Aid: None  Hearing Aid:    Jewelry:    Clothing:    Other Valuables:    Valuables sent to safe:                          Learning About Coronavirus (COVID-19)  Coronavirus (COVID-19): Overview  What is coronavirus ([id number])? The coronavirus disease (COVID-19) is caused by a virus. It is an illness that was first found in [place], [place], in December 2019. It has since spread worldwide. The virus can cause fever, cough, and trouble breathing. In severe cases, it can cause pneumonia and make it hard to breathe without help. It can cause death. Coronaviruses are a large group of viruses. They cause the common cold. They also cause more serious illnesses like Middle East respiratory syndrome (MERS) and severe acute respiratory syndrome (SARS). COVID-19 is caused by a novel coronavirus. That means it's a new type that has not been seen in people before. This virus spreads person-to-person through droplets from coughing and sneezing. It can also spread when you are close to someone who is infected. And it can spread when you touch something that has the virus on it, such as a doorknob or a tabletop. What can you do to protect yourself from coronavirus (COVID-19)? The best way to protect yourself from getting sick is to:  · Avoid areas where there is an outbreak. · Avoid contact with people who may be infected. · Wash your hands often with soap or alcohol-based hand sanitizers. · Avoid crowds and try to stay at least 6 feet away from other people.   · Wash your hands often, especially after you cough or sneeze. Use soap and water, and scrub for at least 20 seconds. If soap and water aren't available, use an alcohol-based hand . · Avoid touching your mouth, nose, and eyes. What can you do to avoid spreading the virus to others? To help avoid spreading the virus to others:  · Cover your mouth with a tissue when you cough or sneeze. Then throw the tissue in the trash. · Use a disinfectant to clean things that you touch often. · Stay home if you are sick or have been exposed to the virus. Don't go to school, work, or public areas. And don't use public transportation. · If you are sick:  ? Leave your home only if you need to get medical care. But call the doctor's office first so they know you're coming. And wear a face mask, if you have one.  ? If you have a face mask, wear it whenever you're around other people. It can help stop the spread of the virus when you cough or sneeze. ? Clean and disinfect your home every day. Use household  and disinfectant wipes or sprays. Take special care to clean things that you grab with your hands. These include doorknobs, remote controls, phones, and handles on your refrigerator and microwave. And don't forget countertops, tabletops, bathrooms, and computer keyboards. When to call for help  Call 911 anytime you think you may need emergency care. For example, call if:  · You have severe trouble breathing. (You can't talk at all.)  · You have constant chest pain or pressure. · You are severely dizzy or lightheaded. · You are confused or can't think clearly. · Your face and lips have a blue color. · You pass out (lose consciousness) or are very hard to wake up. Call your doctor now if you develop symptoms such as:  · Shortness of breath. · Fever. · Cough. If you need to get care, call ahead to the doctor's office for instructions before you go.  Make sure you wear a face mask, if you have one, to prevent exposing other people to the virus.  Where can you get the latest information? The following health organizations are tracking and studying this virus. Their websites contain the most up-to-date information. Shaquille Chamberlain also learn what to do if you think you may have been exposed to the virus. · U.S. Centers for Disease Control and Prevention (CDC): The CDC provides updated news about the disease and travel advice. The website also tells you how to prevent the spread of infection. www.cdc.gov  · World Health Organization [facility]): WHO offers information about the virus outbreaks. WHO also has travel advice. www.who.int  Current as of: April 1, 2020               Content Version: 12.4  © 2756-9935 Healthwise, Incorporated. Care instructions adapted under license by your healthcare professional. If you have questions about a medical condition or this instruction, always ask your healthcare professional. [address] any warranty or liability for your use of this information.

## 2022-02-15 NOTE — PROCEDURES
[address] [facility], MD, FACP, Josh Edwinparrish Simpson, [place]      Selma Hahn, TUNDE Sánchez, ACNP-BC     Danae Wallace, [facility]   ZAKIA BennettP-ADI Solis, [facility]       Rocio Sotelo [facility] Tao 136    at [address], [address] [place] [address], [facility] 22.    [phone]    FAX: [address] [place] [address], [address]    [phone]    FAX: [phone]         UPPER ENDOSCOPY PROCEDURE NOTE    Maite De La O  1960    INDICATION: Cirrhosis. Screening for esophageal varices with variceal ligation if  indicated. : Clover Bearden MD    SURGICAL ASSISTANT:  None    PROSTHETIC DEVISES, TISSUE GRAFTS, ORGAN TRANSPLANTS:  Not applicable     ANESTHESIA/SEDATION: MAC Sedation per anesthesiology    PROCEDURE DESCRIPTION:  [employer] consent was obtained from the patient for the procedure. All risks and benefits of the procedure explained. The procedure was performed in the endoscopy suite. The patient was laying on a stretcher and moved to the left lateral decubitus position prior to administration of sedation. Sedation was administered by anesthesiology. See their note for details. The endoscope was inserted into the mouth and advanced under direct vision to the second portion of the duodenum. Careful inspection of upper gastrointestinal tract was made as the endoscope was inserted and withdrawn. Retroflexion of the endoscope to view of the cardia of the stomach was performed. The findings and interventions are described below. FINDINGS:  Esophagus:    Normal.      Stomach:   Mild portal hypertensive gastropathy of the body of the stomach  Gastritis of the antrum  No gastric varices identified.     Duodenum: Normal bulb and second portion    SPECIMENS COLLECTED:   2 biopsies were taken from the antrum. The specimens were placed in preservative and transported to Pathology after the procedure. INTERVENTIONS:  None    COMPLICATIONS: None. The patient tolerated the procedure well. EBL: Negligible. RECOMMENDATIONS:  Observe until discharge parameters are achieved. May resume previous diet. Repeat endoscopy to reassess varices and need for banding in 3 years. Follow-up Liver [facility] office as scheduled.       Solomon Segura MD  [facility] [address] [place] [address] 22.  [phone]  [address] [place] [address]

## 2022-02-15 NOTE — H&P
3340 [facility], MD, [place], [facility], [place]      TUNDE Hodgson, [facility]-BC     Danae Wallace, [facility]   Papo Silverio LAST-ADI Kemp, [facility]       Rocio Sotelo [facility] [address]    at [facility]    [address], [address] [place] Lopez  22.    [phone]    FAX: [address] [place] [address], [address]    [phone]    FAX: [phone]         PRE-PROCEDURE NOTE - EGD    H and P from last office visit reviewed. Allergies reviewed. Out-patient medicaton list reviewed. Patient Active Problem List   Diagnosis Code    WILD (nonalcoholic steatohepatitis) K75.81    H/O cervical spine surgery Z98.890    H/O arthroscopic knee surgery Z98.890       Allergies   Allergen Reactions    Codeine Nausea and Vomiting    Sulfa (Sulfonamide Antibiotics) Rash       No current facility-administered medications on file prior to encounter. Current Outpatient Medications on File Prior to Encounter   Medication Sig Dispense Refill    triamcinolone acetonide (KENALOG) 0.1 % topical cream Apply  to affected area two (2) times a day. use thin layer 80 g 3    triamcinolone acetonide (KENALOG) 0.1 % ointment Apply  to affected area two (2) times a day. use thin layer 80 g 5       For EGD to assess for esophageal and gastric varices. Plan to perform banding if indicated based upon variceal size and appearance. The risks of the procedure were discussed with the patient. These included reaction to anesthesia, pain, perforation and bleeding. All questions were answered. The patient wishes to proceed with the procedure.     The patient was counseled at length about the risks of cristobal Covid-19 in the antelmo-operative and post-operative states including the recovery window of their procedure. The patient was made aware that cristobal Covid-19 after a surgical procedure may worsen their prognosis for recovering from the virus and lend to a higher morbidity and or mortality risk. The patient was given the options of postponing their procedure. All of the risks, benefits, and alternatives were discussed. The patient does  wish to proceed with the procedure. PHYSICAL EXAMINATION:  Visit Vitals  BP (!) 153/87   Pulse 89   Temp 98.8 °F (37.1 °C)   Resp 17   Ht 5' 8\" (1.727 m)   Wt 194 lb (88 kg)   SpO2 99%   Breastfeeding No   BMI 29.50 kg/m²       General: No acute distress. Eyes: Sclera anicteric. ENT: No oral lesions. Thyroid normal.  Nodes: No adenopathy. Skin: No spider angiomata. No jaundice. No palmar erythema. Respiratory: Lungs clear to auscultation. Cardiovascular: Regular heart rate. No murmurs. No JVD. Abdomen: Soft non-tender, liver size normal to percussion/palpation. Spleen not palpable. No obvious ascites. Extremities: No edema. No muscle wasting. No gross arthritic changes. Neurologic: Alert and oriented. Cranial nerves grossly intact. No asterixis. MOST RECENT LABORATORY STUDIES:  Lab Results   Component Value Date/Time    WBC 2.5 (L) 01/27/2022 02:11 PM    HGB 11.4 (L) 01/27/2022 02:11 PM    HCT 37.9 01/27/2022 02:11 PM    PLATELET 85 (L) 89/70/1256 02:11 PM    MCV 83.7 01/27/2022 02:11 PM     Lab Results   Component Value Date/Time    INR 1.2 (H) 01/27/2022 02:11 PM    INR 1.1 09/29/2016 12:00 AM    INR 1.0 10/02/2014 09:38 AM    Prothrombin time 12.0 (H) 01/27/2022 02:11 PM    Prothrombin time 11.1 09/29/2016 12:00 AM    Prothrombin time 10.6 10/02/2014 09:38 AM       ASSESSMENT AND PLAN:  EGD to assess for esophageal and/or gastric varices.   Sedation per anesthesiology      MD Tia Tilley 13 of [address] [facility] [address] Palomares 7  [place], [place] Y[place]

## 2022-02-15 NOTE — ANESTHESIA POSTPROCEDURE EVALUATION
Post-Anesthesia Evaluation and Assessment    Patient: Jeanette Mireles MRN: [id number]  SSN: [id number]    YOB: 1960  Age: 64 y.o. Sex: female      I have evaluated the patient and they are stable and ready for discharge from the PACU. Cardiovascular Function/Vital Signs  Visit Vitals  /79   Pulse 84   Temp 36.3 °C (97.3 °F)   Resp 22   Ht 5' 8\" (1.727 m)   Wt 88 kg (194 lb)   SpO2 94%   Breastfeeding No   BMI 29.50 kg/m²       Patient is status post MAC anesthesia for Procedure(s):  ESOPHAGOGASTRODUODENOSCOPY (EGD) (URGENT) -;  ESOPHAGOGASTRODUODENAL (EGD) BIOPSY. Nausea/Vomiting: None    Postoperative hydration reviewed and adequate. Pain:  Pain Scale 1: Numeric (0 - 10) (02/15/22 0758)  Pain Intensity 1: 0 (02/15/22 0758)   Managed    Neurological Status: At baseline    Mental Status, Level of Consciousness: Alert and  oriented to person, place, and time    Pulmonary Status:   O2 Device: None (Room air) (02/15/22 0758)   Adequate oxygenation and airway patent    Complications related to anesthesia: None    Post-anesthesia assessment completed. No concerns    Signed By: Christine Salazar MD     February 15, 2022              Procedure(s):  ESOPHAGOGASTRODUODENOSCOPY (EGD) (URGENT) -;  ESOPHAGOGASTRODUODENAL (EGD) BIOPSY. MAC    <BSHSIANPOST>    INITIAL Post-op Vital signs:   Vitals Value Taken Time   /86 02/15/22 0805   Temp 36.3 °C (97.3 °F) 02/15/22 0748   Pulse 79 02/15/22 0809   Resp 17 02/15/22 0809   SpO2 100 % 02/15/22 0809   Vitals shown include unvalidated device data.

## 2022-03-08 ENCOUNTER — TELEPHONE (OUTPATIENT)
Dept: HEMATOLOGY | Age: 62
End: 2022-03-08

## 2022-03-08 NOTE — TELEPHONE ENCOUNTER
Faxed last note and history/medical short form request per STEVE Hodgson and [facility] for patient that the fax went through

## 2022-04-25 ENCOUNTER — OFFICE VISIT (OUTPATIENT)
Dept: HEMATOLOGY | Age: 62
End: 2022-04-25
Payer: OTHER GOVERNMENT

## 2022-04-25 VITALS
WEIGHT: 187 LBS | OXYGEN SATURATION: 100 % | HEART RATE: 81 BPM | BODY MASS INDEX: 28.34 KG/M2 | HEIGHT: 68 IN | TEMPERATURE: 96.7 F | DIASTOLIC BLOOD PRESSURE: 76 MMHG | SYSTOLIC BLOOD PRESSURE: 132 MMHG | RESPIRATION RATE: 16 BRPM

## 2022-04-25 DIAGNOSIS — K75.81 NASH (NONALCOHOLIC STEATOHEPATITIS): Primary | ICD-10-CM

## 2022-04-25 LAB
ALBUMIN SERPL-MCNC: 3.8 G/DL (ref 3.5–5)
ALBUMIN/GLOB SERPL: 0.9 {RATIO} (ref 1.1–2.2)
ALP SERPL-CCNC: 143 U/L (ref 45–117)
ALT SERPL-CCNC: 71 U/L (ref 12–78)
ANION GAP SERPL CALC-SCNC: 10 MMOL/L (ref 5–15)
AST SERPL-CCNC: 82 U/L (ref 15–37)
BILIRUB DIRECT SERPL-MCNC: 0.6 MG/DL (ref 0–0.2)
BILIRUB SERPL-MCNC: 1.4 MG/DL (ref 0.2–1)
BUN SERPL-MCNC: 10 MG/DL (ref 6–20)
BUN/CREAT SERPL: 13 (ref 12–20)
CALCIUM SERPL-MCNC: 8.9 MG/DL (ref 8.5–10.1)
CHLORIDE SERPL-SCNC: 104 MMOL/L (ref 97–108)
CO2 SERPL-SCNC: 26 MMOL/L (ref 21–32)
CREAT SERPL-MCNC: 0.79 MG/DL (ref 0.55–1.02)
ERYTHROCYTE [DISTWIDTH] IN BLOOD BY AUTOMATED COUNT: 20.9 % (ref 11.5–14.5)
GLOBULIN SER CALC-MCNC: 4.1 G/DL (ref 2–4)
GLUCOSE SERPL-MCNC: 112 MG/DL (ref 65–100)
HCT VFR BLD AUTO: 40 % (ref 35–47)
HGB BLD-MCNC: 12.7 G/DL (ref 11.5–16)
INR PPP: 1.2 (ref 0.9–1.1)
MCH RBC QN AUTO: 29.5 PG (ref 26–34)
MCHC RBC AUTO-ENTMCNC: 31.8 G/DL (ref 30–36.5)
MCV RBC AUTO: 93 FL (ref 80–99)
NRBC # BLD: 0 K/UL (ref 0–0.01)
NRBC BLD-RTO: 0 PER 100 WBC
PLATELET # BLD AUTO: 63 K/UL (ref 150–400)
POTASSIUM SERPL-SCNC: 3.4 MMOL/L (ref 3.5–5.1)
PROT SERPL-MCNC: 7.9 G/DL (ref 6.4–8.2)
PROTHROMBIN TIME: 12.3 SEC (ref 9–11.1)
RBC # BLD AUTO: 4.3 M/UL (ref 3.8–5.2)
SODIUM SERPL-SCNC: 140 MMOL/L (ref 136–145)
WBC # BLD AUTO: 1.9 K/UL (ref 3.6–11)

## 2022-04-25 PROCEDURE — 99214 OFFICE O/P EST MOD 30 MIN: CPT | Performed by: PHYSICIAN ASSISTANT

## 2022-04-25 RX ORDER — ONDANSETRON 4 MG/1
TABLET, FILM COATED ORAL
COMMUNITY
Start: 2022-03-18 | End: 2022-07-05

## 2022-04-25 RX ORDER — VITAMIN E 1000 UNIT
1000 CAPSULE ORAL DAILY
COMMUNITY

## 2022-04-25 NOTE — PROGRESS NOTES
Identified pt with two pt identifiers(name and ). Reviewed record in preparation for visit and have obtained necessary documentation. Chief Complaint   Patient presents with    Fatty Liver     3 mo f/u      Vitals:    22 0859 22 0911   BP: (!) 149/81 132/76   Pulse: 81    Resp: 16    Temp: (!) 96.7 °F (35.9 °C)    TempSrc: Temporal    SpO2: 100%    Weight: 187 lb (84.8 kg)    Height: 5' 8\" (1.727 m)    PainSc:   4    PainLoc: Hip        Health Maintenance Review: Patient reminded of \"due or due soon\" health maintenance. I have asked the patient to contact his/her primary care provider (PCP) for follow-up on his/her health maintenance. Coordination of Care Questionnaire:  :   1) Have you been to an emergency room, urgent care, or hospitalized since your last visit? If yes, where when, and reason for visit? no       2. Have seen or consulted any other health care provider since your last visit? If yes, where when, and reason for visit? YES, hip relpacement 3/17/22      Patient is accompanied by self I have received verbal consent from Leonides Fletcher to discuss any/all medical information while they are present in the room.

## 2022-04-25 NOTE — PROGRESS NOTES
3340 [facility], MD, [address], Cite [facility], [facility], TUNDE Mccrary, [facility]-BC     Danae Wallace, [facility]   SUSHILA Freed, [facility]       [facility] 136    at [place] [id number], [address] Collin Lopez  22.    [phone]    FAX: [address] [place] [address], [address]    [phone]    FAX: [phone]       Patient Care Team:  Zoe Hurt MD as PCP - General (Family Medicine)  Samara Davidson MD (Gastroenterology)  Argentina Sanders MD (Hepatology)  Brendon Brunson MD (Orthopedic Surgery)    Patient Active Problem List   Diagnosis Code    WILD (nonalcoholic steatohepatitis) K75.81    H/O cervical spine surgery Z98.890    H/O arthroscopic knee surgery Z98.890       Debra Sanchez returns to the [address] for management of 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 The active problem list, all pertinent past medical history, medications, liver histology, endoscopic studies, radiologic findings and laboratory findings related to the liver disorder were reviewed with the patient. The patient has recently returned to this office for follow-up after 4 year hiatus. She had a change in insurance that prevented her from maintaining follow-up in this office for several years. The patient is a pleasant 64 y.o.  female with a long-standing history of elevated liver enzymes thought to be due to WILD. This was assessed with liver biopsy in 2014 and deemed stage 2 at that time. She has since had repeat Fibroscan in this office that was suggestive of advanced fibrosis/cirrhosis.   There is also portal hypertension suggested with her low platelet count. She was sent for updated clearance with this office as she has multiple orthopedic issues that required surgical consideration. We put her through updated imaging with ultrasound and EGD in 2/2022. This demonstrated no varices with mild portal hypertensive gastropathy. She had no mass/lesion of the liver. She underwent successful right hip replacement 3/17/22 and did well post-operatively and had no bleeding or anesthesia complications. She is ambulating without a cane and without pain. She will at some point need to have the knee done as well, no surgical date on deck. Many years ago, she originally presented due to wide-spread steroid responsive rash that was thought to be related to liver condition. She had seem rheumatology at that the time of presentation and no underlying systemic etiology of the rash was identified, she has been treating this with topical steroid creams, this is presently quiet. She has intermittently followed Weight Watchers program and low carb diet. Her weight has fluctuated within 20#. She is presently down ~12-13# post-operatively as she can move more and is feeling well. The patient overall feels well and voices no complaints except for the intermittent rash with pruritus and joint pains as described above. The patient completes all daily activities with mild functional limitations. The patient has not experienced fatigue, arthralgias, myalgias, problems concentrating, swelling of the abdomen, swelling of the lower extremities, hematemesis, and hematochezia. ALLERGIES  Allergies   Allergen Reactions    Codeine Nausea and Vomiting    Sulfa (Sulfonamide Antibiotics) Rash     MEDICATIONS  Current Outpatient Medications   Medication Sig    ascorbic acid, vitamin C, (VITAMIN C) 1,000 mg tablet     multivitamin (MULTIPLE VITAMINS PO)     triamcinolone acetonide (KENALOG) 0.1 % topical cream Apply  to affected area two (2) times a day. use thin layer    triamcinolone acetonide (KENALOG) 0.1 % ointment Apply  to affected area two (2) times a day. use thin layer    ondansetron hcl (ZOFRAN) 4 mg tablet  (Patient not taking: Reported on 2022)     No current facility-administered medications for this visit. REVIEW OF SYSTEMS:  Systems related to all organ systems were reviewed. All were negative except as noted above. FAMILY HISTORY:  The father  of cirrhosis. Unclear etiology. The mother  of heart disease. Aunts and uncles with features of metabolic syndrome. SOCIAL HISTORY:  The patient is . The patient has 2 children and 3 grandchildren. The patient stopped using tobacco products in . The patient consumes 1-2 alcoholic beverages weekly. She has consumed minimal alcohol since 2013. The patient currently works full time . PHYSICAL EXAMINATION:  Visit Vitals  /76   Pulse 81   Temp (!) 96.7 °F (35.9 °C) (Temporal)   Resp 16   Ht 5' 8\" (1.727 m)   Wt 187 lb (84.8 kg)   SpO2 100%   BMI 28.43 kg/m²     General: No acute distress. Walks with some difficulty  Eyes: Sclera anicteric. ENT: No oral lesions. Thyroid normal.  Nodes: No adenopathy. Skin: No spider angiomata. No jaundice. No palmar erythema. Erythematous paques-like lesion on overlying left tibia. Involvement of elbows. Respiratory: Lungs clear to auscultation. Cardiovascular: Regular heart rate. No murmurs. No JVD. Abdomen: Soft non-tender. Liver size normal to percussion/palpation. Spleen not palpable. No obvious ascites. Extremities: No edema. No muscle wasting. Significant for right knee swelling. Neurologic: Alert and oriented. Cranial nerves grossly intact. No asterixsis.     LABORATORY STUDIES:  Liver [place] [address] & Units 2022   WBC 3.6 - 11.0 K/uL 2.5 (L)    ANC 1.8 - 8.0 K/UL 1.2 (L)    HGB 11.5 - 16.0 g/dL 11.4 (L)     - 400 K/uL 85 (L)    INR 0.9 - 1. 1   1.2 (H)    AST 15 - 37 U/L 85 (H) 88 (H)   ALT 12 - 78 U/L 73 69 (H)   Alk Phos 45 - 117 U/L 130 (H)    Bili, Total 0.2 - 1.0 MG/DL 0.8 0.5   Bili, Direct 0.0 - 0.2 MG/DL 0.3 (H)    Albumin 3.5 - 5.0 g/dL 4.0    BUN 6 - 20 MG/DL 14    Creat 0.55 - 1.02 MG/DL 0.88    Na 136 - 145 mmol/L 139    K 3.5 - 5.1 mmol/L 3.4 (L)    Cl 97 - 108 mmol/L 107    CO2 21 - 32 mmol/L 25    Glucose 65 - 100 mg/dL 96 95     Cancer Screening Latest Ref Rng & Units 1/27/2022   AFP, Serum 0.0 - 8.0 ng/mL 15.3 (H)   AFP-L3% 0.0 - 9.9 % 7.1   Additional lab values drawn at today's office visit are pending at the time of documentation. SEROLOGIES:  Serologies Latest Ref Rng & Units 1/27/2022   Ferritin 8 - 252 NG/ML 16   Iron % Saturation 20 - 50 % 7 (L)   5/2013. ceruloplasmin 25, alpha-1-antitrypsin 145. Serologies Latest Ref Rng 10/2/2014   Ferritin 15 - 150 ng/mL 527 (H)   Iron % Saturation 15 - 55 % 25   RAJ, IFA  Negative   ASMCA 0 - 19 Units 9     LIVER HISTOLOGY:  5/2013. Slides reviewed by MLS. WILD with severe inflammation and stage 2 fibrosis. The pattern of the inflammation is not consistent with AIH.  11/2014. Slides reviewed by MLS. WILD with portal, septal and pericellular fibrosis. 1/2022. FibroScan performed at [facility]. EkPa was 30.2. Suggested fibrosis level is F4. CAP score is 240. ENDOSCOPIC PROCEDURES:  2/2022. EGD performed by Dr Lima Hoffmann. No esophageal varices. No gastric varices. Mild portal gastropathy. RADIOLOGY:  10/2017. Ultrasound of liver. Mildly increased liver echogenicity, otherwise normal ultrasound examination of the right upper quadrant. 1/2022. Ultrasound of liver. Echogenic consistent with cirrhosis. No liver mass lesions. No dilated bile ducts. No ascites. +cholelithiasis, splenomegaly. OTHER TESTING:  Not available or performed    ASSESSMENT AND PLAN:  WILD with probable cirrhosis.   We have reviewed past results of the Fibroscan, imaging, and endoscopic procedures in detail as well as the long-term recommendations for follow-up. Etiology is likely due to WILD given the past evaluation negative for other etiologies of liver disease, the presence of steatosis on prior biopsies, and her persistent weight gain. The most recent laboratory studies indicate that the liver transaminases are elevated, alkaline phosphatase is elevated, total bilirubin is normal, INR is elevated, albumin is normal, and the platelet count is depressed. Repeat values including AFP is pending at this time. I have reviewed with the patient that we will need to assess for complications of cirrhosis. This will include ultrasound to assess for liver cancer and signs of portal hypertension. This has been ordered for repeat every 6 months, next 7/2022. She will also continue to have EGD to assess for varices or sources of GI bleeding. This was last performed 2/2022 and will be repeated in 3 years. The patient was counseled regarding diet and exercise to achieve weight loss. She is recommitting to Weight Watchers, I have encouraged her in this regard and congratulated her on the 12# weight loss thus far. She has expressed interest in clinical trials and we will forward her name to studies for consdieration. Rash with pruritus. The etiology of the skin changes is not clear. I have for now renewed prescription for triamcinolone cream to be taken as needed. We will want to connect her with dermatology for a more formal assessment of this steroid-responsive rash. Pancytopenia  She has had long-standing leukopenia dating back to at least 2014 and has not had prior heme evaluation. This should be considered. The patient's thrombocytopenia is likely due to splenic sequestration from portal hypertension related to her cirrhosis. Will recommend continued oral iron supplementation. No evidence of GI blood losses on endoscopy.      Orthopedic surgery  She did well post-operatively with the hip surgery and will at some point need to have the knee done. Will re-evaluate prior to additional surgical intervention. The ultimate decision regarding whether or not to proceed with surgery will depend upon conversations between the surgeon and patient/and/or family and careful assessment of the risk and benefit of the surgical procedure. The patient was directed to continue all current medications at the current dosages and to limit her intake of NSAIDs as much as possible. The patient was counseled regarding alcohol consumption. [facility] [place] [address] in 3 months for follow-up and same day US. Documentation reviewed and updated to reflect current, accurate patient information.     Kristen Candelario PA-C  [facility] 59, [facility] Collin Whitehead  22.  [phone]  [address]

## 2022-04-28 LAB
AFP L3 MFR SERPL: 6.6 % (ref 0–9.9)
AFP SERPL-MCNC: 12.5 NG/ML (ref 0–9.2)

## 2022-04-29 NOTE — PROGRESS NOTES
Pt notified of reasonably stable values, will plan for follow-up in 3 months with US and continue to trend bili.

## 2022-05-11 ENCOUNTER — DOCUMENTATION ONLY (OUTPATIENT)
Dept: HEMATOLOGY | Age: 62
End: 2022-05-11

## 2022-05-11 NOTE — PROGRESS NOTES
Chart reviewed today for possible consideration in Galectin 031 WILD Cx clinical trial.  Discussed opportunity with patient over the phone. ICF mailed to home address for patient to review. Screening visit scheduled on 07 Jun 2022.

## 2022-06-07 ENCOUNTER — TRANSCRIBE ORDER (OUTPATIENT)
Dept: REGISTRATION | Age: 62
End: 2022-06-07

## 2022-06-07 ENCOUNTER — OFFICE VISIT (OUTPATIENT)
Dept: HEMATOLOGY | Age: 62
End: 2022-06-07

## 2022-06-07 ENCOUNTER — HOSPITAL ENCOUNTER (OUTPATIENT)
Dept: NON INVASIVE DIAGNOSTICS | Age: 62
Discharge: HOME | End: 2022-06-07
Payer: OTHER GOVERNMENT

## 2022-06-07 DIAGNOSIS — Z00.6 RESEARCH EXAM: ICD-10-CM

## 2022-06-07 DIAGNOSIS — Z00.6 RESEARCH EXAM: Primary | ICD-10-CM

## 2022-06-07 LAB
ATRIAL RATE: 85 BPM
CALCULATED P AXIS, ECG09: 67 DEGREES
CALCULATED R AXIS, ECG10: 13 DEGREES
CALCULATED T AXIS, ECG11: 38 DEGREES
DIAGNOSIS, 93000: NORMAL
P-R INTERVAL, ECG05: 152 MS
Q-T INTERVAL, ECG07: 384 MS
QRS DURATION, ECG06: 80 MS
QTC CALCULATION (BEZET), ECG08: 456 MS
VENTRICULAR RATE, ECG03: 85 BPM

## 2022-06-07 PROCEDURE — 93005 ELECTROCARDIOGRAM TRACING: CPT

## 2022-06-07 PROCEDURE — 99214 OFFICE O/P EST MOD 30 MIN: CPT | Performed by: PHYSICIAN ASSISTANT

## 2022-06-07 NOTE — LETTER
6/7/2022 2:06 PM    Ms. Suzie Dunham  [address]  [address] [place] [address]              Sincerely,      STEVE Drake

## 2022-06-07 NOTE — PROGRESS NOTES
[facility], MD, FACP, Josh Simpson, WyTUNDE Alcantara Staff, [facility]     Danae Wallace, [facility]   Josie Solorio, LAST-ADI Meyers, [facility]       [facility] [place] [address]    at [address], [address] [place] Lopez  22.    [phone]    FAX: [address], [address], [address]    [phone]    FAX: 124 [facility] ROBBY Green is a 64 y.o. 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 The patient was seen today for screening to enroll into the [facility] clinical trial for patients with WILD cirrhosis. We have reviewed that this is an infused therapy given every 2 weeks to assess decrease in fibrotic progression as assessed with biopsy and portal hypertensive changes on EGD. The consent form was reviewed with the patient. Potential side effects which may occur in the clinical trial were discussed. The potential benefits of the investigational product were discussed. The patient is aware this is a randomized placebo-controlled clinical trial and she may be receive placebo. All questions raised by the patient were answered. The patient has decided to enter the clinical study and the consent for was signed. The medical history was reviewed. A physical examination was performed. No concerning findings. All symptoms reported by the patient and the findings of the physical examination were recorded in the clinical trial documents. Symptoms of clinical significance were:  Recent hip replacement which went well.  She will likely be needing bilateral knee replacements at some point in the near future and expressed concern with how this might effect clinical trials participation. Findings on physical examination of clinical significance were: None    Patient has underlying cirrhosis and is in need of RUST 75. screening. RUST 75. screening is being performed at appropriate intervals as part of the clinical trial.      Per protocol for additional EGD and liver biopsy.      Randee Vasques PA-C  Liver [facility] 59, 2000 [facility] 22.  [phone]  [address] [place] [address]

## 2022-06-24 ENCOUNTER — TRANSCRIBE ORDER (OUTPATIENT)
Dept: SCHEDULING | Age: 62
End: 2022-06-24

## 2022-06-24 DIAGNOSIS — Z00.6 CLINICAL TRIAL EXAM: Primary | ICD-10-CM

## 2022-06-24 DIAGNOSIS — Z00.6 EXAMINATION OF PARTICIPANT IN CLINICAL TRIAL: Primary | ICD-10-CM

## 2022-07-05 ENCOUNTER — HOSPITAL ENCOUNTER (OUTPATIENT)
Dept: ULTRASOUND IMAGING | Age: 62
Discharge: HOME | End: 2022-07-05
Attending: PHYSICIAN ASSISTANT

## 2022-07-05 ENCOUNTER — OFFICE VISIT (OUTPATIENT)
Dept: HEMATOLOGY | Age: 62
End: 2022-07-05

## 2022-07-05 ENCOUNTER — HOSPITAL ENCOUNTER (OUTPATIENT)
Dept: GENERAL RADIOLOGY | Age: 62
Discharge: HOME | End: 2022-07-05
Attending: PHYSICIAN ASSISTANT

## 2022-07-05 DIAGNOSIS — Z00.6 EXAMINATION OF PARTICIPANT IN CLINICAL TRIAL: ICD-10-CM

## 2022-07-05 DIAGNOSIS — Z00.6 RESEARCH EXAM: Primary | ICD-10-CM

## 2022-07-05 DIAGNOSIS — R35.0 URINARY FREQUENCY: ICD-10-CM

## 2022-07-05 DIAGNOSIS — K75.81 NASH (NONALCOHOLIC STEATOHEPATITIS): ICD-10-CM

## 2022-07-05 LAB

## 2022-07-05 PROCEDURE — 71046 X-RAY EXAM CHEST 2 VIEWS: CPT

## 2022-07-05 PROCEDURE — 99214 OFFICE O/P EST MOD 30 MIN: CPT | Performed by: NURSE PRACTITIONER

## 2022-07-05 PROCEDURE — 76700 US EXAM ABDOM COMPLETE: CPT

## 2022-07-05 NOTE — PROGRESS NOTES
3340 [facility], [place], [address], [place], [place]      TUNDE Brand, [facility]     Danae Wallace [facility]   Rosendo SUSHILA Lombardo, [facility]       Rocio Sotelo [address]    at [facility]    [address] [facility], [address] [facility], [address]    [phone]    FAX: [address] [facility] [address]    [address], [address], [address]    [phone]    FAX: 124 [facility] ROBBY Mccartney is a 58 y.o. 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 The patient was seen today for a screening visit 2 to enroll into the [facility] clinical trial for patients with WILD cirrhosis. The study is an infused therapy of Belapectin (GR-MD-02) versus placebo given every 2 weeks and evaluating is effects on fibrosis. Today's visit was conducted per the study protocol. The patient was asked if any symptoms, side effects or adverse events have occurred since the last study visit. A physical examination was performed. All symptoms reported by the patient and the findings of the physical examination were recorded in the clinical trial documents. Symptoms of clinical significance were:  Urinary frequency and urgency. History of hip replacement. Bilateral knee replacements will be needed in the future which could impact participation in the clinical trial.     Patient has underlying cirrhosis and is in need of [facility] [address]. screening. [facility] [address]. screening is being performed at appropriate intervals as part of the clinical trial.      Plan: Will order urinalysis and treat accordingly if UTI is present. EGD, Biopsy, and ultrasound scheduled. Follow-up per protocol if inclusion criteria is met. Danae Wallace [facility] Liver [place] of [address] [address] [place], 2000 [facility] 22.  201 [facility]

## 2022-07-07 DIAGNOSIS — K75.81 NASH (NONALCOHOLIC STEATOHEPATITIS): Primary | ICD-10-CM

## 2022-07-07 RX ORDER — CIPROFLOXACIN 250 MG/1
250 TABLET, FILM COATED ORAL 2 TIMES DAILY
Qty: 14 TABLET | Refills: 0 | Status: SHIPPED | OUTPATIENT
Start: 2022-07-07 | End: 2022-07-14

## 2022-07-07 NOTE — PROGRESS NOTES
Pt notified of new findings in the Left lobe. Will plan on proceeding with MRI at this time to assess further. Pt agreeable.

## 2022-07-08 LAB
BACTERIA SPEC CULT: ABNORMAL
CC UR VC: ABNORMAL
SERVICE CMNT-IMP: ABNORMAL

## 2022-07-21 ENCOUNTER — HOSPITAL ENCOUNTER (OUTPATIENT)
Dept: MRI IMAGING | Age: 62
Discharge: HOME | End: 2022-07-21
Attending: PHYSICIAN ASSISTANT
Payer: OTHER GOVERNMENT

## 2022-07-21 DIAGNOSIS — K75.81 NASH (NONALCOHOLIC STEATOHEPATITIS): ICD-10-CM

## 2022-07-21 PROCEDURE — 74183 MRI ABD W/O CNTR FLWD CNTR: CPT

## 2022-07-21 PROCEDURE — 74011250636 HC RX REV CODE- 250/636

## 2022-07-21 PROCEDURE — 74011000250 HC RX REV CODE- 250: Performed by: PHYSICIAN ASSISTANT

## 2022-07-21 PROCEDURE — 74011000258 HC RX REV CODE- 258: Performed by: PHYSICIAN ASSISTANT

## 2022-07-21 PROCEDURE — A9576 INJ PROHANCE MULTIPACK: HCPCS

## 2022-07-21 RX ORDER — SODIUM CHLORIDE 0.9 % (FLUSH) 0.9 %
10 SYRINGE (ML) INJECTION
Status: COMPLETED | OUTPATIENT
Start: 2022-07-21 | End: 2022-07-21

## 2022-07-21 RX ADMIN — SODIUM CHLORIDE 100 ML: 900 INJECTION, SOLUTION INTRAVENOUS at 18:21

## 2022-07-21 RX ADMIN — GADOTERIDOL 15 ML: 279.3 INJECTION, SOLUTION INTRAVENOUS at 18:22

## 2022-07-21 RX ADMIN — SODIUM CHLORIDE, PRESERVATIVE FREE 10 ML: 5 INJECTION INTRAVENOUS at 18:22

## 2022-07-22 NOTE — PROGRESS NOTES
Pt notified of findings, no evidence mass/lesion - cyst identified to correlate with US. Will proceed with study screening.

## 2022-07-28 ENCOUNTER — TRANSCRIBE ORDER (OUTPATIENT)
Dept: SCHEDULING | Age: 62
End: 2022-07-28

## 2022-07-28 DIAGNOSIS — Z00.6 CLINICAL TRIAL EXAM: Primary | ICD-10-CM

## 2022-08-02 ENCOUNTER — ANESTHESIA EVENT (OUTPATIENT)
Dept: ENDOSCOPY | Age: 62
End: 2022-08-02

## 2022-08-02 ENCOUNTER — HOSPITAL ENCOUNTER (OUTPATIENT)
Age: 62
Setting detail: OUTPATIENT SURGERY
Discharge: HOME | End: 2022-08-02
Attending: INTERNAL MEDICINE | Admitting: INTERNAL MEDICINE

## 2022-08-02 ENCOUNTER — APPOINTMENT (OUTPATIENT)
Dept: ULTRASOUND IMAGING | Age: 62
End: 2022-08-02
Attending: INTERNAL MEDICINE

## 2022-08-02 ENCOUNTER — ANESTHESIA (OUTPATIENT)
Dept: ENDOSCOPY | Age: 62
End: 2022-08-02

## 2022-08-02 VITALS
TEMPERATURE: 97.3 F | SYSTOLIC BLOOD PRESSURE: 152 MMHG | DIASTOLIC BLOOD PRESSURE: 91 MMHG | OXYGEN SATURATION: 98 % | WEIGHT: 187 LBS | HEART RATE: 70 BPM | HEIGHT: 68 IN | BODY MASS INDEX: 28.34 KG/M2 | RESPIRATION RATE: 18 BRPM

## 2022-08-02 DIAGNOSIS — K76.6 PORTAL HYPERTENSIVE GASTROPATHY (HCC): ICD-10-CM

## 2022-08-02 DIAGNOSIS — Z00.6 CLINICAL TRIAL EXAM: ICD-10-CM

## 2022-08-02 DIAGNOSIS — K31.89 PORTAL HYPERTENSIVE GASTROPATHY (HCC): ICD-10-CM

## 2022-08-02 DIAGNOSIS — K74.60 CIRRHOSIS OF LIVER WITHOUT ASCITES, UNSPECIFIED HEPATIC CIRRHOSIS TYPE (HCC): ICD-10-CM

## 2022-08-02 DIAGNOSIS — K75.81 NASH (NONALCOHOLIC STEATOHEPATITIS): Primary | ICD-10-CM

## 2022-08-02 PROCEDURE — 43235 EGD DIAGNOSTIC BRUSH WASH: CPT | Performed by: INTERNAL MEDICINE

## 2022-08-02 PROCEDURE — 74011000250 HC RX REV CODE- 250: Performed by: STUDENT IN AN ORGANIZED HEALTH CARE EDUCATION/TRAINING PROGRAM

## 2022-08-02 PROCEDURE — 76040000019: Performed by: INTERNAL MEDICINE

## 2022-08-02 PROCEDURE — 77030013826 HC NDL BIOP MAXCOR BARD -B: Performed by: INTERNAL MEDICINE

## 2022-08-02 PROCEDURE — 2709999900 HC NON-CHARGEABLE SUPPLY: Performed by: INTERNAL MEDICINE

## 2022-08-02 PROCEDURE — 74011000250 HC RX REV CODE- 250: Performed by: INTERNAL MEDICINE

## 2022-08-02 PROCEDURE — 74011250636 HC RX REV CODE- 250/636: Performed by: STUDENT IN AN ORGANIZED HEALTH CARE EDUCATION/TRAINING PROGRAM

## 2022-08-02 PROCEDURE — 88313 SPECIAL STAINS GROUP 2: CPT

## 2022-08-02 PROCEDURE — 47000 NEEDLE BIOPSY OF LIVER PERQ: CPT | Performed by: INTERNAL MEDICINE

## 2022-08-02 PROCEDURE — 76942 ECHO GUIDE FOR BIOPSY: CPT | Performed by: INTERNAL MEDICINE

## 2022-08-02 PROCEDURE — 76060000031 HC ANESTHESIA FIRST 0.5 HR: Performed by: INTERNAL MEDICINE

## 2022-08-02 PROCEDURE — 88307 TISSUE EXAM BY PATHOLOGIST: CPT

## 2022-08-02 PROCEDURE — 77030014115: Performed by: INTERNAL MEDICINE

## 2022-08-02 PROCEDURE — 76942 ECHO GUIDE FOR BIOPSY: CPT

## 2022-08-02 RX ORDER — FLUMAZENIL 0.1 MG/ML
0.2 INJECTION INTRAVENOUS
Status: DISCONTINUED | OUTPATIENT
Start: 2022-08-02 | End: 2022-08-02 | Stop reason: HOSPADM

## 2022-08-02 RX ORDER — ATROPINE SULFATE 0.1 MG/ML
0.5 INJECTION INTRAVENOUS
Status: DISCONTINUED | OUTPATIENT
Start: 2022-08-02 | End: 2022-08-02 | Stop reason: HOSPADM

## 2022-08-02 RX ORDER — SODIUM CHLORIDE 9 MG/ML
50 INJECTION, SOLUTION INTRAVENOUS CONTINUOUS
Status: DISCONTINUED | OUTPATIENT
Start: 2022-08-02 | End: 2022-08-02 | Stop reason: HOSPADM

## 2022-08-02 RX ORDER — SODIUM CHLORIDE, SODIUM LACTATE, POTASSIUM CHLORIDE, CALCIUM CHLORIDE 600; 310; 30; 20 MG/100ML; MG/100ML; MG/100ML; MG/100ML
INJECTION, SOLUTION INTRAVENOUS
Status: DISCONTINUED | OUTPATIENT
Start: 2022-08-02 | End: 2022-08-02 | Stop reason: HOSPADM

## 2022-08-02 RX ORDER — ONDANSETRON 2 MG/ML
4 INJECTION INTRAMUSCULAR; INTRAVENOUS
Status: DISCONTINUED | OUTPATIENT
Start: 2022-08-02 | End: 2022-08-02 | Stop reason: HOSPADM

## 2022-08-02 RX ORDER — FENTANYL CITRATE 50 UG/ML
50-200 INJECTION, SOLUTION INTRAMUSCULAR; INTRAVENOUS
Status: DISCONTINUED | OUTPATIENT
Start: 2022-08-02 | End: 2022-08-02 | Stop reason: HOSPADM

## 2022-08-02 RX ORDER — MIDAZOLAM HYDROCHLORIDE 1 MG/ML
5-10 INJECTION, SOLUTION INTRAMUSCULAR; INTRAVENOUS
Status: DISCONTINUED | OUTPATIENT
Start: 2022-08-02 | End: 2022-08-02 | Stop reason: HOSPADM

## 2022-08-02 RX ORDER — EPINEPHRINE 0.1 MG/ML
1 INJECTION INTRACARDIAC; INTRAVENOUS
Status: DISCONTINUED | OUTPATIENT
Start: 2022-08-02 | End: 2022-08-02 | Stop reason: HOSPADM

## 2022-08-02 RX ORDER — PROPOFOL 10 MG/ML
INJECTION, EMULSION INTRAVENOUS AS NEEDED
Status: DISCONTINUED | OUTPATIENT
Start: 2022-08-02 | End: 2022-08-02 | Stop reason: HOSPADM

## 2022-08-02 RX ORDER — DEXTROMETHORPHAN/PSEUDOEPHED 2.5-7.5/.8
1.2 DROPS ORAL
Status: DISCONTINUED | OUTPATIENT
Start: 2022-08-02 | End: 2022-08-02 | Stop reason: HOSPADM

## 2022-08-02 RX ORDER — NALOXONE HYDROCHLORIDE 0.4 MG/ML
0.4 INJECTION, SOLUTION INTRAMUSCULAR; INTRAVENOUS; SUBCUTANEOUS
Status: DISCONTINUED | OUTPATIENT
Start: 2022-08-02 | End: 2022-08-02 | Stop reason: HOSPADM

## 2022-08-02 RX ORDER — LIDOCAINE HYDROCHLORIDE 20 MG/ML
INJECTION, SOLUTION EPIDURAL; INFILTRATION; INTRACAUDAL; PERINEURAL AS NEEDED
Status: DISCONTINUED | OUTPATIENT
Start: 2022-08-02 | End: 2022-08-02 | Stop reason: HOSPADM

## 2022-08-02 RX ORDER — FENTANYL CITRATE 50 UG/ML
25 INJECTION, SOLUTION INTRAMUSCULAR; INTRAVENOUS
Status: DISCONTINUED | OUTPATIENT
Start: 2022-08-02 | End: 2022-08-02 | Stop reason: HOSPADM

## 2022-08-02 RX ORDER — LIDOCAINE HYDROCHLORIDE 10 MG/ML
10 INJECTION INFILTRATION; PERINEURAL ONCE
Status: COMPLETED | OUTPATIENT
Start: 2022-08-02 | End: 2022-08-02

## 2022-08-02 RX ORDER — GLYCOPYRROLATE 0.2 MG/ML
INJECTION INTRAMUSCULAR; INTRAVENOUS AS NEEDED
Status: DISCONTINUED | OUTPATIENT
Start: 2022-08-02 | End: 2022-08-02 | Stop reason: HOSPADM

## 2022-08-02 RX ORDER — SODIUM CHLORIDE 0.9 % (FLUSH) 0.9 %
5-40 SYRINGE (ML) INJECTION AS NEEDED
Status: DISCONTINUED | OUTPATIENT
Start: 2022-08-02 | End: 2022-08-02 | Stop reason: HOSPADM

## 2022-08-02 RX ORDER — SODIUM CHLORIDE 0.9 % (FLUSH) 0.9 %
5-40 SYRINGE (ML) INJECTION EVERY 8 HOURS
Status: DISCONTINUED | OUTPATIENT
Start: 2022-08-02 | End: 2022-08-02 | Stop reason: HOSPADM

## 2022-08-02 RX ADMIN — SODIUM CHLORIDE, POTASSIUM CHLORIDE, SODIUM LACTATE AND CALCIUM CHLORIDE: 600; 310; 30; 20 INJECTION, SOLUTION INTRAVENOUS at 13:37

## 2022-08-02 RX ADMIN — PROPOFOL 50 MG: 10 INJECTION, EMULSION INTRAVENOUS at 13:45

## 2022-08-02 RX ADMIN — GLYCOPYRROLATE 0.2 MG: 0.2 INJECTION, SOLUTION INTRAMUSCULAR; INTRAVENOUS at 13:37

## 2022-08-02 RX ADMIN — PROPOFOL 50 MG: 10 INJECTION, EMULSION INTRAVENOUS at 13:40

## 2022-08-02 RX ADMIN — PROPOFOL 50 MG: 10 INJECTION, EMULSION INTRAVENOUS at 13:55

## 2022-08-02 RX ADMIN — LIDOCAINE HYDROCHLORIDE 60 MG: 20 INJECTION, SOLUTION EPIDURAL; INFILTRATION; INTRACAUDAL; PERINEURAL at 13:37

## 2022-08-02 RX ADMIN — PROPOFOL 100 MG: 10 INJECTION, EMULSION INTRAVENOUS at 13:38

## 2022-08-02 RX ADMIN — PROPOFOL 50 MG: 10 INJECTION, EMULSION INTRAVENOUS at 13:42

## 2022-08-02 RX ADMIN — PROPOFOL 50 MG: 10 INJECTION, EMULSION INTRAVENOUS at 13:50

## 2022-08-02 NOTE — PERIOP NOTES

## 2022-08-02 NOTE — ANESTHESIA PREPROCEDURE EVALUATION
Relevant Problems   GASTROINTESTINAL   (+) WILD (nonalcoholic steatohepatitis)       Anesthetic History     PONV          Review of Systems / Medical History  Patient summary reviewed, nursing notes reviewed and pertinent labs reviewed    Pulmonary  Within defined limits                 Neuro/Psych   Within defined limits           Cardiovascular  Within defined limits                Exercise tolerance: >4 METS     GI/Hepatic/Renal  Within defined limits         Liver disease     Endo/Other  Within defined limits           Other Findings            Physical Exam    Airway  Mallampati: II  TM Distance: 4 - 6 cm  Neck ROM: normal range of motion   Mouth opening: Normal     Cardiovascular  Regular rate and rhythm,  S1 and S2 normal,  no murmur, click, rub, or gallop             Dental  No notable dental hx       Pulmonary  Breath sounds clear to auscultation               Abdominal  GI exam deferred       Other Findings            Anesthetic Plan    ASA: 2  Anesthesia type: MAC          Induction: Intravenous  Anesthetic plan and risks discussed with: Patient

## 2022-08-02 NOTE — ANESTHESIA POSTPROCEDURE EVALUATION
Procedure(s):  ESOPHAGOGASTRODUODENOSCOPY (EGD)  LIVER BIOPSY. MAC    Anesthesia Post Evaluation      Multimodal analgesia: multimodal analgesia used between 6 hours prior to anesthesia start to PACU discharge  Patient location during evaluation: PACU  Level of consciousness: awake  Pain management: satisfactory to patient  Airway patency: patent  Anesthetic complications: no  Cardiovascular status: acceptable and hemodynamically stable  Respiratory status: acceptable  Hydration status: acceptable  Post anesthesia nausea and vomiting:  none  Final Post Anesthesia Temperature Assessment:  Normothermia (36.0-37.5 degrees C)      INITIAL Post-op Vital signs:   Vitals Value Taken Time   /69 08/02/22 1415   Temp     Pulse 86 08/02/22 1419   Resp 22 08/02/22 1419   SpO2 94 % 08/02/22 1419   Vitals shown include unvalidated device data.

## 2022-08-02 NOTE — DISCHARGE INSTRUCTIONS
3340 [facility], MD, Felisa, Josh Oly Calvin, [place]      TUNDE Arrieta, [facility]-BC     Danae Wallace, [facility]   KATI Chow-ADI Sepulveda [facility]       Rocio Stringer [address]    at [address], [address] [place] [address], [place]  22. [phone]    FAX: [address]    at [address], [address]    [phone]    FAX: [phone]         ENDOSCOPY DISCHARGE INSTRUCTIONS      Fetchnotes  1960  Date: 8/2/2022    DISCOMFORT:  Use lozenges or warm salt water gargle for sore thoat  Apply warm compress to IV site if red. If redness or soreness persists call the office. You may experience gas and bloating. Walking and belching will help relieve this. You may experience chest discomfort or pressure especially if banding of esophageal varices was performed. DIET:  You may resume your normal diet. ACTIVITY:  Spend the remainder of the day resting. Avoid any strenuous activity. You may not operate a vehicle for 12 hours. You may not engage in an occupation involving machinery or appliances for rest of today. Avoid making any critical or financial decisions for at least 24 hour. Call the [address] [facility] if you have any of the following:  Increasing chest or abdominal pain, nausea, vomiting, vomiting blood, abdominal distension or swelling, fever or chills, bloody discharge from nose or mouth or shortness of breath. Follow-up Instructions:  Call Dr. Marino Garcia for any questions or problems at the phone number listed above. If a biopsy was performed, you will be contacted by the office staff or Dr Marino Garcia within 1 week.    If you have not heard from us by then you may call the office at the phone number listed above to inquire about the results. ENDOSCOPY FINDINGS:  Your endoscopy demonstrated no esophageal varices. Mild swelling of the stomach  Keep follow-up appointment with Clinical research team      DISCHARGE SUMMARY from the Nurse: The following personal items collected during your admission are returned to you:   Dental Appliance: Dental Appliances: None  Vision: Visual Aid: None  Hearing Aid:    Jewelry:    Clothing:    Other Valuables:    Valuables sent to safe:          [place] [facility] Jimi Mora MD, FACP, Josh Pineda Kirk, [place]      Antonella Stephens, PAADALBERTO Garcia, [facility]     April S Rodrigo, [facility]BC   Paola [place], FNP-C    Salud Yanes, [facility]       Rocio Sotelo [facility] 136    at [address], [address] [place] [address] 22.    [phone]    FAX: [phone] [facility]    at [address], [address]    [phone]    FAX: [phone]         LIVER BIOPSY DISCHARGE INSTRUCTIONS      Michelle Sullivani  1960  Date: 8/2/2022    DIET:    Darrell Matos may resume your previous diet. ACTIVITIES:  Rest quietly the rest of today. You should not lift any objects more than 20 pounds for the next 2 days. If you work sitting down without strenuous activity you may return to work tomorrow. If you exert yourself or do heavy lifting at work you should take tomorrow off. Do not drive or operate hazardous machinery for 12 hours after you are discharged from this procedure. SPECIAL INSTRUCTIONS:  Do not use any aspirin or non-steroidal (Motin, Advil, Naproxen, etc) pain medications for the next 2 days. You may use extra-strength Tylenol (acetaminophen) if you experience pain or discomfort later today. Restarting blood thinners:   If you were taking blood thinners prior to the procedure you can restart these in 2 days. Call the [facility] office if you experience any of the following:  Persistent or severe abdominal pain. Persistent or severe abdominal distention. Fever and chills   Nausea and vomiting. New or unusual symptoms. Follow-up care: You should have a follow up appointment with Dr. Libia Nieves to review the results of the liver biopsy results in 2 weeks. If you do not have an appointment please call the office at the number listed above to schedule this. Other instructions: If you have any problems or questions call the [facility] office at the phone number listed above. DISCHARGE SUMMARY from Nurse: The following personal items collected during your admission are returned to you:   Dental Appliance: Dental Appliances: None  Vision: Visual Aid: None  Hearing Aid:    Jewelry:    Clothing:    Other Valuables:    Valuables sent to safe:            Learning About Coronavirus (COVID-19)  Coronavirus (COVID-19): Overview  What is coronavirus (GSEOB-21)? The coronavirus disease (COVID-19) is caused by a virus. It is an illness that was first found in [place], [place], in December 2019. It has since spread worldwide. The virus can cause fever, cough, and trouble breathing. In severe cases, it can cause pneumonia and make it hard to breathe without help. It can cause death. Coronaviruses are a large group of viruses. They cause the common cold. They also cause more serious illnesses like Middle East respiratory syndrome (MERS) and severe acute respiratory syndrome (SARS). COVID-19 is caused by a novel coronavirus. That means it's a new type that has not been seen in people before. This virus spreads person-to-person through droplets from coughing and sneezing. It can also spread when you are close to someone who is infected.  And it can spread when you touch something that has the virus on it, such as a doorknob or a tabletop. What can you do to protect yourself from coronavirus (COVID-19)? The best way to protect yourself from getting sick is to: Avoid areas where there is an outbreak. Avoid contact with people who may be infected. Wash your hands often with soap or alcohol-based hand sanitizers. Avoid crowds and try to stay at least 6 feet away from other people. Wash your hands often, especially after you cough or sneeze. Use soap and water, and scrub for at least 20 seconds. If soap and water aren't available, use an alcohol-based hand . Avoid touching your mouth, nose, and eyes. What can you do to avoid spreading the virus to others? To help avoid spreading the virus to others:  Cover your mouth with a tissue when you cough or sneeze. Then throw the tissue in the trash. Use a disinfectant to clean things that you touch often. Stay home if you are sick or have been exposed to the virus. Don't go to school, work, or public areas. And don't use public transportation. If you are sick:  Leave your home only if you need to get medical care. But call the doctor's office first so they know you're coming. And wear a face mask, if you have one. If you have a face mask, wear it whenever you're around other people. It can help stop the spread of the virus when you cough or sneeze. Clean and disinfect your home every day. Use household  and disinfectant wipes or sprays. Take special care to clean things that you grab with your hands. These include doorknobs, remote controls, phones, and handles on your refrigerator and microwave. And don't forget countertops, tabletops, bathrooms, and computer keyboards. When to call for help  Call 911 anytime you think you may need emergency care. For example, call if:  You have severe trouble breathing. (You can't talk at all.)  You have constant chest pain or pressure. You are severely dizzy or lightheaded.   You are confused or can't think clearly. Your face and lips have a blue color. You pass out (lose consciousness) or are very hard to wake up. Call your doctor now if you develop symptoms such as:  Shortness of breath. Fever. Cough. If you need to get care, call ahead to the doctor's office for instructions before you go. Make sure you wear a face mask, if you have one, to prevent exposing other people to the virus. Where can you get the latest information? The following health organizations are tracking and studying this virus. Their websites contain the most up-to-date information. Catrina Re also learn what to do if you think you may have been exposed to the virus. U.S. Centers for Disease Control and Prevention (CDC): The CDC provides updated news about the disease and travel advice. The website also tells you how to prevent the spread of infection. www.cdc.gov  World Health Organization [facility]): WHO offers information about the virus outbreaks. WHO also has travel advice. www.who.int  Current as of: April 1, 2020               Content Version: 12.4  © 2006-2020 Healthwise, Incorporated. Care instructions adapted under license by your healthcare professional. If you have questions about a medical condition or this instruction, always ask your healthcare professional. [address] any warranty or liability for your use of this information.

## 2022-08-02 NOTE — H&P
Gwen Dawson MD, [address], [place], [place]      TUNDE Alvares, [facility]     Danae GRIJALVA Rodrigo, [facility]   SUSHILA Smith, [facility]       Rocio Sotelo Myke [address]    at [address], [address] [place] Lopez  22.    [phone]    FAX: [address] [place] [address], [address]    [phone]    FAX: [phone]         PRE-PROCEDURE NOTE - EGD    H and P from last office visit reviewed. Allergies reviewed. Out-patient medicaton list reviewed. Patient Active Problem List   Diagnosis Code    WILD (nonalcoholic steatohepatitis) K75.81    H/O cervical spine surgery Z98.890    H/O arthroscopic knee surgery Z98.890       Allergies   Allergen Reactions    Adhesive Other (comments)     bandaids - causes a rash/states she prefers tape    Codeine Nausea and Vomiting    Sulfa (Sulfonamide Antibiotics) Rash       No current facility-administered medications on file prior to encounter. Current Outpatient Medications on File Prior to Encounter   Medication Sig Dispense Refill    cholecalciferol, vitamin D3, (VITAMIN D3 PO) Take  by mouth daily. OTC      levocetirizine dihydrochloride (XYZAL PO) Take  by mouth as needed. cetirizine HCl (ZYRTEC PO) Take  by mouth as needed. triamcinolone acetonide (KENALOG) 0.1 % topical cream Apply  to affected area as needed for Skin Irritation. use thin layer      ascorbic acid, vitamin C, (VITAMIN C) 1,000 mg tablet Take 1,000 mg by mouth daily. For EGD to assess for esophageal and gastric varices as part of Galactin clinical trial.  The EGD will be digitally recorded per study protocol.   Will perform liver biopsy after EGD to document cirrhosis as part of study criteria. The risks of the procedure were discussed with the patient. These included reaction to anesthesia, pain, perforation and bleeding. All questions were answered. The patient wishes to proceed with the procedure. The patient was counseled at length about the risks of cristobal Covid-19 in the antelmo-operative and post-operative states including the recovery window of their procedure. The patient was made aware that cristobal Covid-19 after a surgical procedure may worsen their prognosis for recovering from the virus and lend to a higher morbidity and or mortality risk. The patient was given the options of postponing their procedure. All of the risks, benefits, and alternatives were discussed. The patient does  wish to proceed with the procedure. PHYSICAL EXAMINATION:  Visit Vitals  BP (!) 146/89   Pulse 78   Temp 97.3 °F (36.3 °C)   Resp 15   Ht 5' 8\" (1.727 m)   Wt 187 lb (84.8 kg)   SpO2 100%   Breastfeeding No   BMI 28.43 kg/m²       General: No acute distress. Eyes: Sclera anicteric. ENT: No oral lesions. Thyroid normal.  Nodes: No adenopathy. Skin: No spider angiomata. No jaundice. No palmar erythema. Respiratory: Lungs clear to auscultation. Cardiovascular: Regular heart rate. No murmurs. No JVD. Abdomen: Soft non-tender, liver size normal to percussion/palpation. Spleen not palpable. No obvious ascites. Extremities: No edema. No muscle wasting. No gross arthritic changes. Neurologic: Alert and oriented. Cranial nerves grossly intact. No asterixis.       MOST RECENT LABORATORY STUDIES:  Lab Results   Component Value Date/Time    WBC 1.9 (L) 04/25/2022 09:54 AM    HGB 12.7 04/25/2022 09:54 AM    HCT 40.0 04/25/2022 09:54 AM    PLATELET 63 (L) 45/54/8258 09:54 AM    MCV 93.0 04/25/2022 09:54 AM     Lab Results   Component Value Date/Time    INR 1.2 (H) 04/25/2022 09:54 AM    INR 1.2 (H) 01/27/2022 02:11 PM    INR 1.1 09/29/2016 12:00 AM    Prothrombin time 12.3 (H) 04/25/2022 09:54 AM    Prothrombin time 12.0 (H) 01/27/2022 02:11 PM    Prothrombin time 11.1 09/29/2016 12:00 AM       ASSESSMENT AND PLAN:  EGD to assess for esophageal and/or gastric varices.   Sedation per anesthesiology      MD [address] [place] [place] [address] 22.  [phone]  [address]

## 2022-08-02 NOTE — PROCEDURES
3340 [facility], MD, FACP, Josh Oly Simpson, [place]      TUNDE Gale, [facility]-BC     Danae Wallace [facility]   SUSHILA Winslow, [facility]       Rocio Sotelo [facility] 136    at [address], [facility] [place] Lopez  22. [phone]    FAX: [address] [place] [address], [address]    [phone]    FAX: [phone]         UPPER ENDOSCOPY PROCEDURE NOTE    Lili Roche  1960    INDICATION: Cirrhosis. Screening for esophageal varices with variceal ligation if  indicated. : Madison Candelaria MD    SURGICAL ASSISTANT:  None    PROSTHETIC DEVISES, TISSUE GRAFTS, ORGAN TRANSPLANTS:  Not applicable     ANESTHESIA/SEDATION: MAC Sedation per anesthesiology      PROCEDURE DESCRIPTION:  Infomed consent was obtained from the patient for the procedure. All risks and benefits of the procedure explained. The patient is participating in the galactin clinical trial trial for patients with WILD and cirrhosis. The procedure was digitally recorded per the study protocol. The procedure was performed in the endoscopy suite. The patient was laying on a stretcher and moved to the left lateral decubitus position prior to administration of sedation. Sedation was administered by anesthesiology. See their note for details. The endoscope was inserted into the mouth and advanced under direct vision to the second portion of the duodenum. Careful inspection of upper gastrointestinal tract was made as the endoscope was inserted and withdrawn. Retroflexion of the endoscope to view of the cardia of the stomach was performed. The findings and interventions are described below. FINDINGS:  Esophagus:    Normal.      Stomach:   Normal  Mild portal hypertensive gastropathy of the body of the stomach  No gastric varices identified. Duodenum:   Normal bulb and second portion    SPECIMENS COLLECTED:   None    INTERVENTIONS:  None    COMPLICATIONS: None. The patient tolerated the procedure well. EBL: Negligible. RECOMMENDATIONS:  Observe until discharge parameters are achieved. May resume previous diet. Repeat endoscopy to reassess varices and need for banding in 3 years. Follow-up Liver [facility] office as scheduled.       Valerie Berry MD  [address] 22.  [phone]  [address]

## 2022-08-02 NOTE — PROCEDURES
3340 [facility], MD, [place], [facility], [place]      TUNDE Marquez, [facility]-DEANA Fink S Rodrigo, [facility]   KATI Sutherland-ADI Le, [facility]       R[facility]io [facility] 136    at [address], [address] [place] Lopez  22.    [phone]    FAX: [address] [place] [address], [address] [place] [address] [address]    [phone]    FAX: [phone]         LIVER BIOPSY PROCEDURE NOTE    Rama Harmon  1960    INDICATIONS/PRE-OPERATIVE  DIAGNOSIS:  NAFLD and cirrhosis    : Kevin Mccallum MD    SURGICAL ASSISTANT:  None    PROSTHETIC DEVICES, TISSUE GRAFTS, TRANSPLANTED ORGANS:  Not applicable    SEDATION: 1% Lidocaine injection 10 ml    PROCEDURE:  Informed consent to perform the procedure was obtained from the patient. The patient was positioned on the edge of the stretcher lying flat in the supine position. Ultrasound was utilized to image the liver. The diaphragm and any major mass lesion or vascular structures within the liver were identified. An appropriate site for liver biopsy was identified. The distance from the surface of the skin to the liver capsule was 5 cm. This area was prepped with betadine and draped in sterile fashion. The skin was infiltrated with 1% lidocaine. The deeper subcutanous tissues and liver capsule overlying the biopsy site were then infiltrated with 1% lidocaine until appropriate anesthesia was obtained. A small incision was made in the skin so the biopsy devise could be easily inserted. A total of 2 passes with the 16 gauge Bard biopsy devise was then made into the liver. Core(s) of liver tissue totaling 4 cm in length were obtained and placed into tissue fixative.   A band aid was placed over the biopsy site. The patient was then repositioned on the right side and transported to the recovery area on the stretcher for routine monitoring until discharge. The specimen was sent to pathology for processing via the normal transport mechanism. SPECIMEN COLLECTED: Liver    INTERVENTIONS:  None    ESTIMATED BLOOD LOSS: Negligible.      COMPLICATIONS:  None    POST-OPERATIVE DIAGNOSIS: Same as Pre-operative Diagnosis      MD Og Ruiz[address] 22.  [phone]  [address] [place] [address]

## 2022-08-15 ENCOUNTER — HOSPITAL ENCOUNTER (OUTPATIENT)
Dept: INFUSION THERAPY | Age: 62
Discharge: HOME | End: 2022-08-15
Payer: OTHER GOVERNMENT

## 2022-08-15 ENCOUNTER — HOSPITAL ENCOUNTER (OUTPATIENT)
Dept: INFUSION THERAPY | Age: 62
End: 2022-08-15

## 2022-08-15 ENCOUNTER — HOSPITAL ENCOUNTER (OUTPATIENT)
Dept: NON INVASIVE DIAGNOSTICS | Age: 62
Discharge: HOME | End: 2022-08-15
Payer: OTHER GOVERNMENT

## 2022-08-15 ENCOUNTER — TRANSCRIBE ORDER (OUTPATIENT)
Dept: REGISTRATION | Age: 62
End: 2022-08-15

## 2022-08-15 ENCOUNTER — OFFICE VISIT (OUTPATIENT)
Dept: HEMATOLOGY | Age: 62
End: 2022-08-15

## 2022-08-15 VITALS
OXYGEN SATURATION: 98 % | DIASTOLIC BLOOD PRESSURE: 79 MMHG | HEART RATE: 74 BPM | TEMPERATURE: 97.4 F | SYSTOLIC BLOOD PRESSURE: 123 MMHG

## 2022-08-15 VITALS — HEIGHT: 68 IN | BODY MASS INDEX: 28.43 KG/M2

## 2022-08-15 DIAGNOSIS — Z00.6 RESEARCH EXAM: ICD-10-CM

## 2022-08-15 DIAGNOSIS — Z00.6 RESEARCH EXAM: Primary | ICD-10-CM

## 2022-08-15 LAB
ATRIAL RATE: 66 BPM
CALCULATED P AXIS, ECG09: 55 DEGREES
CALCULATED R AXIS, ECG10: 18 DEGREES
CALCULATED T AXIS, ECG11: 38 DEGREES
DIAGNOSIS, 93000: NORMAL
P-R INTERVAL, ECG05: 146 MS
Q-T INTERVAL, ECG07: 424 MS
QRS DURATION, ECG06: 82 MS
QTC CALCULATION (BEZET), ECG08: 444 MS
VENTRICULAR RATE, ECG03: 66 BPM

## 2022-08-15 PROCEDURE — 74011250636 HC RX REV CODE- 250/636: Performed by: INTERNAL MEDICINE

## 2022-08-15 PROCEDURE — 96365 THER/PROPH/DIAG IV INF INIT: CPT

## 2022-08-15 PROCEDURE — 74011000250 HC RX REV CODE- 250: Performed by: INTERNAL MEDICINE

## 2022-08-15 PROCEDURE — 99214 OFFICE O/P EST MOD 30 MIN: CPT | Performed by: NURSE PRACTITIONER

## 2022-08-15 PROCEDURE — 91200 LIVER ELASTOGRAPHY: CPT | Performed by: NURSE PRACTITIONER

## 2022-08-15 PROCEDURE — 93005 ELECTROCARDIOGRAM TRACING: CPT

## 2022-08-15 RX ORDER — SODIUM CHLORIDE 0.9 % (FLUSH) 0.9 %
10 SYRINGE (ML) INJECTION AS NEEDED
Status: DISCONTINUED | OUTPATIENT
Start: 2022-08-15 | End: 2022-08-16 | Stop reason: HOSPADM

## 2022-08-15 RX ORDER — SODIUM CHLORIDE 9 MG/ML
25 INJECTION, SOLUTION INTRAVENOUS ONCE
Status: COMPLETED | OUTPATIENT
Start: 2022-08-15 | End: 2022-08-15

## 2022-08-15 RX ADMIN — SODIUM CHLORIDE 25 ML/HR: 9 INJECTION, SOLUTION INTRAVENOUS at 10:47

## 2022-08-15 RX ADMIN — SODIUM CHLORIDE, PRESERVATIVE FREE 10 ML: 5 INJECTION INTRAVENOUS at 10:45

## 2022-08-15 NOTE — PROGRESS NOTES
3340 [facility], MD, [address], [facility], [place]      TUNDE Mills, [facility]-BC     Danae Wallace, [facility]   SUSHILA Prieto, [facility]       Rocio Sotelo Myke [address] 136    at [facility]    [address], [address] [place] Lopez  22.    [phone]    FAX: [address], [address], [address]    [phone]    FAX: 124 [facility] NOTE    Alvis Duane is a 58 y.o. 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 The patient was seen today for a day 1 visit to enroll into the [facility] clinical trial for patients with WILD cirrhosis. The study is an infused therapy of Belapectin (GR-MD-02) versus placebo given every 2 weeks and evaluating is effects on fibrosis. Today's visit was conducted per the study protocol. The patient was asked if any symptoms, side effects or adverse events have occurred since the last study visit. A physical examination was performed. All symptoms reported by the patient and the findings of the physical examination were recorded in the clinical trial documents. Symptoms of clinical significance were:  None    Findings on physical examination of clinical significance were: None    Patient has underlying cirrhosis and is in need of [facility] [address] [address] screening. [facility] [address] screening is being performed at appropriate intervals as part of the clinical trial.  The next imaging will be due at week 24. AFP 12.5/AFPL3 6.6. HISTOLOGY:  1/2022. FibroScan performed at [address]. EkPa was 30.2. IQR/med 12%. . The results suggested a fibrosis level of F4. The CAP score suggests there is no significant hepatic steatosis. 8/02/2022. Liver Biopsy.  Macrovesicular steatosis involving 5% of the parenchyma, ballooning present, lobular and portal inflammation and cirrhosis. ZACHARIAH 4.   8/2022. FibroScan performed at [address]. EkPa was 61.0. IQR/med 6%. . The results suggested a fibrosis level of F4. The CAP score suggests there is hepatic steatosis. RADIOLOGY:  7/2022. Ultrasound of liver. Echogenic consistent with cirrhosis. No dilated bile ducts. No ascites. Mild ascites. Solid hypoechoic mass in the left lobe measuring 1.1 x 0.8 x 0.9 cm. Adjacent to this is a 0.8 x 0.8 x 0.8 cm mass. 7/2022. MRI of liver. Cirrhotic appearing liver. No suspicious mass or lesion. 1.5 x 1.4 cm nonenhancing cyst in the upper central liver. Left lobe hyperintense lesions largest 4 mm likely a cyst. There is a 5 mm cyst in the right kidney and a 7 mm cyst in the left kidney. Follow-up per protocol in 2 weeks. April S.  ANGELICA Wallace-[facility] of [address] [address] Zak [place], 2000 [facility] 22.  201 [facility]

## 2022-08-29 ENCOUNTER — HOSPITAL ENCOUNTER (OUTPATIENT)
Dept: INFUSION THERAPY | Age: 62
Discharge: HOME | End: 2022-08-29

## 2022-08-29 ENCOUNTER — RESEARCH ENCOUNTER (OUTPATIENT)
Dept: PHARMACY | Age: 62
End: 2022-08-29

## 2022-08-31 ENCOUNTER — RESEARCH ENCOUNTER (OUTPATIENT)
Dept: PHARMACY | Age: 62
End: 2022-08-31

## 2022-09-12 ENCOUNTER — APPOINTMENT (OUTPATIENT)
Dept: INFUSION THERAPY | Age: 62
End: 2022-09-12

## 2022-09-26 ENCOUNTER — APPOINTMENT (OUTPATIENT)
Dept: INFUSION THERAPY | Age: 62
End: 2022-09-26
Payer: OTHER GOVERNMENT

## 2022-09-28 ENCOUNTER — APPOINTMENT (OUTPATIENT)
Dept: HEMATOLOGY | Age: 62
End: 2022-09-28

## 2022-09-28 ENCOUNTER — HOSPITAL ENCOUNTER (OUTPATIENT)
Dept: INFUSION THERAPY | Age: 62
Discharge: HOME | End: 2022-09-28
Payer: OTHER GOVERNMENT

## 2022-09-28 VITALS
HEART RATE: 67 BPM | DIASTOLIC BLOOD PRESSURE: 86 MMHG | OXYGEN SATURATION: 99 % | TEMPERATURE: 97.6 F | SYSTOLIC BLOOD PRESSURE: 152 MMHG

## 2022-09-28 PROCEDURE — 74011250636 HC RX REV CODE- 250/636: Performed by: INTERNAL MEDICINE

## 2022-09-28 PROCEDURE — 96365 THER/PROPH/DIAG IV INF INIT: CPT

## 2022-09-28 RX ORDER — SODIUM CHLORIDE 0.9 % (FLUSH) 0.9 %
10 SYRINGE (ML) INJECTION AS NEEDED
Status: DISCONTINUED | OUTPATIENT
Start: 2022-09-28 | End: 2022-09-29 | Stop reason: HOSPADM

## 2022-09-28 RX ORDER — SODIUM CHLORIDE 9 MG/ML
25 INJECTION, SOLUTION INTRAVENOUS ONCE
Status: COMPLETED | OUTPATIENT
Start: 2022-09-28 | End: 2022-09-28

## 2022-09-28 RX ADMIN — SODIUM CHLORIDE 25 ML/HR: 9 INJECTION, SOLUTION INTRAVENOUS at 09:45

## 2022-09-28 NOTE — PROGRESS NOTES
[facility] Peds/Adult Note                       Date: 2022    Name: Angelita Tomlin    MRN: [id number]         : 1960 Patient arrives for INV ELLIE-MD-02 without acute problems. Please see [facility] for complete assessment and education provided. Prior to treatment, patient was screened for COVID 19. Patient denies any signs or symptoms of COVID. Denies any known physical contact with anyone diagnosed with, or with pending or positive COVID test. Denies a pending or positive COVID test himself. Vital signs stable throughout and prior to discharge. Patient tolerated procedure well and was discharged without incident. Patient is aware of next [facility] appointment on  10/10/2022  Appointment card give to the Patient      Ms. Smith's vitals were reviewed prior to and after treatment. Patient Vitals for the past 12 hrs:   Temp Pulse BP SpO2   22 1116 -- 67 (!) 152/86 --   22 1043 -- 72 130/86 99 %   22 1000 -- -- -- 99 %   22 0922 97.6 °F (36.4 °C) 66 (!) 145/87 99 %         Lab results:    No results found for this or any previous visit (from the past 12 hour(s)). Medications given:     Medications Administered       INV MUNDO-02 galactoarabino-rhamnogalacturonate/placebo in 0.9% sodium chloride 100 mL (+/- 10% base fluid volume) investigational infusion       Admin Date  2022 Action  New Bag Dose   Rate  100 mL/hr Route  IntraVENous Administered By  Radha Ortega RN                  Ms. Shu Mckeon tolerated the infusion, and had no complaints. Ms. Shu Mckeon was discharged from [place] [address] in stable condition. Discharge Instructions provided to patient, patient verbalized understanding and was given a copy of d/c instructions.      Future Appointments   Date Time Provider Peggy Khan   10/10/2022  8:20 AM Yan Correa BS AMB   10/10/2022  9:00 AM B4 PEDS [facility]   10/24/2022  8:20 AM [facility]NURSE_RESEARCH NOY ADAMES   10/24/2022  9:00 AM FRANNY REMY [facility]   11/8/2022  9:00 AM Danae Wallace, JOSE RAUL Olson RN  September 28, 2022  10:23 AM

## 2022-10-10 ENCOUNTER — APPOINTMENT (OUTPATIENT)
Dept: INFUSION THERAPY | Age: 62
End: 2022-10-10
Payer: OTHER GOVERNMENT

## 2022-10-10 ENCOUNTER — HOSPITAL ENCOUNTER (OUTPATIENT)
Dept: INFUSION THERAPY | Age: 62
Discharge: HOME | End: 2022-10-10
Payer: OTHER GOVERNMENT

## 2022-10-10 ENCOUNTER — OFFICE VISIT (OUTPATIENT)
Dept: HEMATOLOGY | Age: 62
End: 2022-10-10

## 2022-10-10 VITALS
OXYGEN SATURATION: 99 % | TEMPERATURE: 98.1 F | HEART RATE: 67 BPM | SYSTOLIC BLOOD PRESSURE: 144 MMHG | DIASTOLIC BLOOD PRESSURE: 74 MMHG

## 2022-10-10 DIAGNOSIS — Z00.6 RESEARCH EXAM: Primary | ICD-10-CM

## 2022-10-10 DIAGNOSIS — K75.81 NASH (NONALCOHOLIC STEATOHEPATITIS): ICD-10-CM

## 2022-10-10 PROCEDURE — 99214 OFFICE O/P EST MOD 30 MIN: CPT | Performed by: PHYSICIAN ASSISTANT

## 2022-10-10 PROCEDURE — 96365 THER/PROPH/DIAG IV INF INIT: CPT

## 2022-10-10 PROCEDURE — 74011250636 HC RX REV CODE- 250/636: Performed by: INTERNAL MEDICINE

## 2022-10-10 PROCEDURE — 74011000250 HC RX REV CODE- 250: Performed by: INTERNAL MEDICINE

## 2022-10-10 RX ORDER — SODIUM CHLORIDE 9 MG/ML
25 INJECTION, SOLUTION INTRAVENOUS ONCE
Status: COMPLETED | OUTPATIENT
Start: 2022-10-10 | End: 2022-10-10

## 2022-10-10 RX ORDER — SODIUM CHLORIDE 0.9 % (FLUSH) 0.9 %
10 SYRINGE (ML) INJECTION AS NEEDED
Status: DISCONTINUED | OUTPATIENT
Start: 2022-10-10 | End: 2022-10-11 | Stop reason: HOSPADM

## 2022-10-10 RX ADMIN — SODIUM CHLORIDE, PRESERVATIVE FREE 10 ML: 5 INJECTION INTRAVENOUS at 09:15

## 2022-10-10 RX ADMIN — SODIUM CHLORIDE 25 ML/HR: 9 INJECTION, SOLUTION INTRAVENOUS at 09:16

## 2022-10-10 NOTE — PROGRESS NOTES
[address] [facility] [address] @ [facility] VISIT NOTE     0900 Patient arrives for ROLANDO FLOWER-02 Dose 3 without acute problems. Please see connect [facility] for complete assessment and education provided. Vital signs stable throughout and prior to discharge, Pt. Tolerated treatment well and discharged without incident. Patient is aware of next [facility] appointment on 10/17/2022. Appointment card given to patient. Patient did wait 30 minutes post infusion for observation with no adverse reactions noted @ this time. The patient/parent denies any symptoms of COVID (SOB, coughing, fever, or generally not feeling well), denies any known exposure to COVID-19 recently. Medications Verified by Gagandeep Babb RN & Lizeth Valente RN:  1. ROLANDO YOUSIF02/Placebo over 1 hour  2.  NS IV Flush & [address]   Patient Vitals for the past 12 hrs:   Temp Pulse BP SpO2   10/10/22 1130 -- 67 (!) 144/74 --   10/10/22 1100 98.1 °F (36.7 °C) 68 (!) 145/76 --   10/10/22 0904 97.5 °F (36.4 °C) 73 (!) 156/85 99 %

## 2022-10-10 NOTE — PROGRESS NOTES
[address] [facility], MD, FACP, Josh Oly [place], [place]      TUNDE Hodgson, [facility]-BC   Loree Muro, [facility]   Tiana Ortiz, FNP-C   Papo Silverio, FNP-C    Durga Kemp, [facility]       Rocio Sotelo [address]    at [address], [address] [facility], [place]  22.    [phone]    FAX: [address] [place] [address], [address], [address]    [phone]    FAX: Rashida 8 NOTE    Marquis Taylor is a 58 y.o. 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 The patient was seen today for infusion #3 visit for the [facility] clinical trial for patients with WILD cirrhosis. The study is an infused therapy of Belapectin (GR-MD-02) versus placebo given every 2 weeks and evaluating is effects on fibrosis. Today's visit was conducted per the study protocol. The patient was asked if any symptoms, side effects or adverse events have occurred since the last study visit. A physical examination was performed per protocol, this was normal - no new findings. All symptoms reported by the patient and the findings of the physical examination were recorded in the clinical trial documents. Symptoms of clinical significance were:  None, she is tolerating infusions well with no localized reaction. Findings on physical examination of clinical significance were: None    Patient has underlying cirrhosis and [facility]. [facility] [facility]. screening is being performed at appropriate intervals as part of the clinical trial.  The next imaging will be due at week 24. AFP 12.5/AFPL3 6.6. HISTOLOGY:  1/2022. FibroScan performed at [facility]. EkPa was 30.2. IQR/med 12%. .  The results suggested a fibrosis level of F4. The CAP score suggests there is no significant hepatic steatosis. 8/02/2022. Liver Biopsy. Macrovesicular steatosis involving 5% of the parenchyma, ballooning present, lobular and portal inflammation and cirrhosis. ZACHARIAH 4.   8/2022. [facility]. EkPa was 61.0. IQR/med 6%. . The results suggested a fibrosis level of F4. The CAP score suggests there is hepatic steatosis. RADIOLOGY:  7/2022. Ultrasound of liver. Echogenic consistent with cirrhosis. No dilated bile ducts. No ascites. Mild ascites. Solid hypoechoic mass in the left lobe measuring 1.1 x 0.8 x 0.9 cm. Adjacent to this is a 0.8 x 0.8 x 0.8 cm mass. 7/2022. MRI of liver. Cirrhotic appearing liver. No suspicious mass or lesion. 1.5 x 1.4 cm nonenhancing cyst in the upper central liver. Left lobe hyperintense lesions largest 4 mm likely a cyst. There is a 5 mm cyst in the right kidney and a 7 mm cyst in the left kidney. Follow-up per protocol in 2 weeks. Documentation reviewed and updated to reflect current, accurate patient information.     Duong Hutchison PA-C  Liver [facility] 59, 734 [facility] Collin [place]  22.  [phone]  [address] [facility]

## 2022-10-17 ENCOUNTER — APPOINTMENT (OUTPATIENT)
Dept: HEMATOLOGY | Age: 62
End: 2022-10-17

## 2022-10-17 ENCOUNTER — HOSPITAL ENCOUNTER (OUTPATIENT)
Dept: INFUSION THERAPY | Age: 62
Discharge: HOME | End: 2022-10-17
Payer: OTHER GOVERNMENT

## 2022-10-17 VITALS
HEART RATE: 70 BPM | SYSTOLIC BLOOD PRESSURE: 145 MMHG | RESPIRATION RATE: 16 BRPM | DIASTOLIC BLOOD PRESSURE: 77 MMHG | TEMPERATURE: 97.3 F

## 2022-10-17 PROCEDURE — 74011250636 HC RX REV CODE- 250/636: Performed by: INTERNAL MEDICINE

## 2022-10-17 PROCEDURE — 96365 THER/PROPH/DIAG IV INF INIT: CPT

## 2022-10-17 PROCEDURE — 74011000250 HC RX REV CODE- 250: Performed by: INTERNAL MEDICINE

## 2022-10-17 RX ORDER — SODIUM CHLORIDE 0.9 % (FLUSH) 0.9 %
10 SYRINGE (ML) INJECTION AS NEEDED
Status: DISCONTINUED | OUTPATIENT
Start: 2022-10-17 | End: 2022-10-18 | Stop reason: HOSPADM

## 2022-10-17 RX ORDER — SODIUM CHLORIDE 9 MG/ML
10 INJECTION, SOLUTION INTRAVENOUS CONTINUOUS
Status: DISCONTINUED | OUTPATIENT
Start: 2022-10-17 | End: 2022-10-18 | Stop reason: HOSPADM

## 2022-10-17 RX ADMIN — SODIUM CHLORIDE 10 ML/HR: 9 INJECTION, SOLUTION INTRAVENOUS at 08:47

## 2022-10-17 RX ADMIN — SODIUM CHLORIDE, PRESERVATIVE FREE 10 ML: 5 INJECTION INTRAVENOUS at 08:47

## 2022-10-17 NOTE — PROGRESS NOTES
[address] [facility] @ [facility] VISIT NOTE    5275 Patient arrives for ROLANDO ROBLEDO/Placebo Infusion Dose 4 without acute problems. Please see connect care for complete assessment and education provided. Vital signs stable throughout and prior to discharge, Pt. Tolerated treatment well and discharged without incident. Patient is aware of next [facility] appointment on 10/24/2022. Appointment card given to patient. Medications Verified by Jovan Palacios RN & Celio Carrera RN:  ROLANDO ROBLEDO/Placebo over 1 hour  2.    NS IV flush & 22[address] [facility] Patient Vitals for the past 12 hrs:   Temp Pulse Resp BP   10/17/22 1049 -- 70 16 (!) 145/77   10/17/22 1021 -- 62 16 (!) 155/83   10/17/22 0819 97.3 °F (36.3 °C) 71 16 (!) 155/84

## 2022-10-24 ENCOUNTER — HOSPITAL ENCOUNTER (OUTPATIENT)
Dept: INFUSION THERAPY | Age: 62
Discharge: HOME | End: 2022-10-24
Payer: OTHER GOVERNMENT

## 2022-10-24 ENCOUNTER — APPOINTMENT (OUTPATIENT)
Dept: INFUSION THERAPY | Age: 62
End: 2022-10-24
Payer: OTHER GOVERNMENT

## 2022-10-24 ENCOUNTER — APPOINTMENT (OUTPATIENT)
Dept: HEMATOLOGY | Age: 62
End: 2022-10-24

## 2022-10-24 VITALS
HEART RATE: 70 BPM | DIASTOLIC BLOOD PRESSURE: 76 MMHG | RESPIRATION RATE: 16 BRPM | TEMPERATURE: 97.3 F | SYSTOLIC BLOOD PRESSURE: 141 MMHG

## 2022-10-24 PROCEDURE — 74011250636 HC RX REV CODE- 250/636: Performed by: INTERNAL MEDICINE

## 2022-10-24 PROCEDURE — 74011000250 HC RX REV CODE- 250: Performed by: INTERNAL MEDICINE

## 2022-10-24 PROCEDURE — 96365 THER/PROPH/DIAG IV INF INIT: CPT

## 2022-10-24 RX ORDER — SODIUM CHLORIDE 9 MG/ML
10 INJECTION, SOLUTION INTRAVENOUS CONTINUOUS
Status: DISCONTINUED | OUTPATIENT
Start: 2022-10-24 | End: 2022-10-25 | Stop reason: HOSPADM

## 2022-10-24 RX ORDER — SODIUM CHLORIDE 0.9 % (FLUSH) 0.9 %
10 SYRINGE (ML) INJECTION AS NEEDED
Status: DISPENSED | OUTPATIENT
Start: 2022-10-24 | End: 2022-10-24

## 2022-10-24 RX ADMIN — SODIUM CHLORIDE, PRESERVATIVE FREE 10 ML: 5 INJECTION INTRAVENOUS at 09:00

## 2022-10-24 RX ADMIN — SODIUM CHLORIDE 10 ML/HR: 9 INJECTION, SOLUTION INTRAVENOUS at 09:00

## 2022-10-24 NOTE — PROGRESS NOTES
[address] [facility] [address] @ [facility] VISIT NOTE    5008 Patient arrives for INV MUNDO-O2/Placebo Infusion Dose 5 without acute problems. Please see connect care for complete assessment and education provided. Vital signs stable throughout and prior to discharge, Pt. Tolerated treatment well and discharged without incident. Patient aware of next [facility] appointment on 10/31/2022. Appointment card given to patient. Medications Verified by Chey Macias RN & Mickey King RN:  INV MUNDO-O2/Placebo over 1 hour  2.    NS IV flush & 2225 [facility] Patient Vitals for the past 12 hrs:   Temp Pulse Resp BP   10/24/22 1052 -- 70 16 (!) 141/76   10/24/22 0850 97.3 °F (36.3 °C) 82 16 (!) 151/75

## 2022-10-31 ENCOUNTER — APPOINTMENT (OUTPATIENT)
Dept: HEMATOLOGY | Age: 62
End: 2022-10-31

## 2022-10-31 ENCOUNTER — HOSPITAL ENCOUNTER (OUTPATIENT)
Dept: INFUSION THERAPY | Age: 62
Discharge: HOME | End: 2022-10-31
Payer: OTHER GOVERNMENT

## 2022-10-31 VITALS
DIASTOLIC BLOOD PRESSURE: 82 MMHG | HEART RATE: 79 BPM | RESPIRATION RATE: 16 BRPM | SYSTOLIC BLOOD PRESSURE: 148 MMHG | TEMPERATURE: 98.2 F

## 2022-10-31 PROCEDURE — 96365 THER/PROPH/DIAG IV INF INIT: CPT

## 2022-10-31 PROCEDURE — 74011000250 HC RX REV CODE- 250: Performed by: INTERNAL MEDICINE

## 2022-10-31 PROCEDURE — 74011250636 HC RX REV CODE- 250/636: Performed by: INTERNAL MEDICINE

## 2022-10-31 RX ORDER — SODIUM CHLORIDE 0.9 % (FLUSH) 0.9 %
10 SYRINGE (ML) INJECTION AS NEEDED
Status: DISCONTINUED | OUTPATIENT
Start: 2022-10-31 | End: 2022-11-01 | Stop reason: HOSPADM

## 2022-10-31 RX ORDER — SODIUM CHLORIDE 9 MG/ML
25 INJECTION, SOLUTION INTRAVENOUS ONCE
Status: COMPLETED | OUTPATIENT
Start: 2022-10-31 | End: 2022-10-31

## 2022-10-31 RX ADMIN — SODIUM CHLORIDE 25 ML/HR: 9 INJECTION, SOLUTION INTRAVENOUS at 09:17

## 2022-10-31 RX ADMIN — SODIUM CHLORIDE, PRESERVATIVE FREE 10 ML: 5 INJECTION INTRAVENOUS at 09:15

## 2022-10-31 NOTE — PROGRESS NOTES
[address] [facility] @ [facility] VISIT NOTE     8346 Patient arrives for INV MUNDO-02 Dose 6 without acute problems. Please see connect care for complete assessment and education provided. Vital signs stable throughout and prior to discharge, Pt. Tolerated treatment well and discharged without incident. Patient is aware of next [facility] appointment on 11/08/2022. Appointment card given to patient. Medications Verified by Sharita Fontenot RN &  Meg Whitten RN:  1. INV BENJA02/Placebo over 1 hour  2.  NS IV Flush & 2225 [facility]   Patient Vitals for the past 12 hrs:   Temp Pulse Resp BP   10/31/22 1122 98.2 °F (36.8 °C) 79 16 (!) 148/82   10/31/22 1055 98.2 °F (36.8 °C) 76 16 (!) 145/81   10/31/22 0905 98 °F (36.7 °C) 80 18 (!) 146/85

## 2022-11-07 ENCOUNTER — APPOINTMENT (OUTPATIENT)
Dept: INFUSION THERAPY | Age: 62
End: 2022-11-07
Payer: OTHER GOVERNMENT

## 2022-11-08 ENCOUNTER — HOSPITAL ENCOUNTER (OUTPATIENT)
Dept: INFUSION THERAPY | Age: 62
Discharge: HOME | End: 2022-11-08
Payer: OTHER GOVERNMENT

## 2022-11-08 ENCOUNTER — OFFICE VISIT (OUTPATIENT)
Dept: HEMATOLOGY | Age: 62
End: 2022-11-08

## 2022-11-08 VITALS
DIASTOLIC BLOOD PRESSURE: 84 MMHG | TEMPERATURE: 98.6 F | HEART RATE: 79 BPM | RESPIRATION RATE: 16 BRPM | SYSTOLIC BLOOD PRESSURE: 143 MMHG

## 2022-11-08 DIAGNOSIS — Z00.6 RESEARCH EXAM: ICD-10-CM

## 2022-11-08 DIAGNOSIS — K75.81 NASH (NONALCOHOLIC STEATOHEPATITIS): Primary | ICD-10-CM

## 2022-11-08 PROCEDURE — 99214 OFFICE O/P EST MOD 30 MIN: CPT | Performed by: NURSE PRACTITIONER

## 2022-11-08 PROCEDURE — 96365 THER/PROPH/DIAG IV INF INIT: CPT

## 2022-11-08 PROCEDURE — 74011000250 HC RX REV CODE- 250: Performed by: INTERNAL MEDICINE

## 2022-11-08 PROCEDURE — 74011250636 HC RX REV CODE- 250/636: Performed by: INTERNAL MEDICINE

## 2022-11-08 RX ORDER — SODIUM CHLORIDE 9 MG/ML
25 INJECTION, SOLUTION INTRAVENOUS ONCE
Status: COMPLETED | OUTPATIENT
Start: 2022-11-08 | End: 2022-11-08

## 2022-11-08 RX ORDER — SODIUM CHLORIDE 0.9 % (FLUSH) 0.9 %
10 SYRINGE (ML) INJECTION AS NEEDED
Status: DISCONTINUED | OUTPATIENT
Start: 2022-11-08 | End: 2022-11-09 | Stop reason: HOSPADM

## 2022-11-08 RX ADMIN — SODIUM CHLORIDE 25 ML/HR: 9 INJECTION, SOLUTION INTRAVENOUS at 09:48

## 2022-11-08 RX ADMIN — SODIUM CHLORIDE, PRESERVATIVE FREE 10 ML: 5 INJECTION INTRAVENOUS at 09:45

## 2022-11-08 NOTE — PROGRESS NOTES
[facility], MD, FACP, [place], [place]      TUNDE Lara, [facility]-BC   Cat Fraser, [facility]   Janine Vegas, FNP-ADI Prabhakar, FNP-C    Jaynee Castleman, [facility]       [facility] 136    at [address], [address] Linnea Kevin[place]  22.    [phone]    FAX: [address] [place] [address], [address], [address]    [phone]    FAX: Rashida 8 NOTE    Tess Kowalski is a 58 y.o. 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 The patient was seen today for infusion #3 visit for the [facility] clinical trial for patients with WILD cirrhosis. The study is an infused therapy of Belapectin (GR-MD-02) versus placebo given every 2 weeks and evaluating is effects on fibrosis. Today's visit was conducted per the study protocol. The patient was asked if any symptoms, side effects or adverse events have occurred since the last study visit. A physical examination was performed per protocol, this was normal - no new findings. All symptoms reported by the patient and the findings of the physical examination were recorded in the clinical trial documents. Symptoms of clinical significance were:  Abdominal bloating and intermittent nausea, does not want medication for nausea. She is tolerating infusions well with no localized reaction. Findings on physical examination of clinical significance were: None    Patient has underlying cirrhosis and is in need of [address] screening. [facility] [address] screening is being performed at appropriate intervals as part of the clinical trial.  The next imaging will be due at week 24. AFP 12.5/AFPL3 6.6. AFP ordered at today's visit. HISTOLOGY:  1/2022.   FibroScan performed at [address]. EkPa was 30.2. IQR/med 12%. . The results suggested a fibrosis level of F4. The CAP score suggests there is no significant hepatic steatosis. 8/02/2022. Liver Biopsy. Macrovesicular steatosis involving 5% of the parenchyma, ballooning present, lobular and portal inflammation and cirrhosis. ZACHARIAH 4.   8/2022. FibroScan performed at [address]. EkPa was 61.0. IQR/med 6%. . The results suggested a fibrosis level of F4. The CAP score suggests there is hepatic steatosis. RADIOLOGY:  7/2022. Ultrasound of liver. Echogenic consistent with cirrhosis. No dilated bile ducts. No ascites. Mild ascites. Solid hypoechoic mass in the left lobe measuring 1.1 x 0.8 x 0.9 cm. Adjacent to this is a 0.8 x 0.8 x 0.8 cm mass. 7/2022. MRI of liver. Cirrhotic appearing liver. No suspicious mass or lesion. 1.5 x 1.4 cm nonenhancing cyst in the upper central liver. Left lobe hyperintense lesions largest 4 mm likely a cyst. There is a 5 mm cyst in the right kidney and a 7 mm cyst in the left kidney. Follow-up per protocol in 2 weeks. Documentation reviewed and updated to reflect current, accurate patient information.     KATI Osman-ADI  Liver [facility] 59, 903 [facility] [address].  [phone]  [address]

## 2022-11-08 NOTE — PROGRESS NOTES
[facility] Peds/Adult Note                       Date: 2022    Name: Law Epperson    MRN: [id number]         : 1960 Patient arrives for Research medication without acute problems. Please see [facility] for complete assessment and education provided. Vital signs stable throughout and prior to discharge. Patient tolerated procedure well and was discharged without incident. Patient is aware of next [facility] appointment on  2022  Appointment card give to the Patient      Ms. Smith's vitals were reviewed prior to and after treatment. Patient Vitals for the past 12 hrs:   Temp Pulse Resp BP   22 1135 98.6 °F (37 °C) 79 16 (!) 143/84   22 1115 -- 77 16 137/73   22 0940 98 °F (36.7 °C) 83 18 129/86       Lab results were obtained for research study. No results found for this or any previous visit (from the past 12 hour(s)). Medications given:   Medications Administered       0.9% sodium chloride infusion       Admin Date  2022 Action  New Bag Dose  25 mL/hr Rate  25 mL/hr Route  IntraVENous Administered By  Dipesh Brown RN              INV GR-MD-02 galactoarabino-rhamnogalacturonate/placebo in 0.9% sodium chloride 100 mL (+/- 10% base fluid volume) investigational infusion       Admin Date  2022 Action  New Bag Dose   Rate  100 mL/hr Route  IntraVENous Administered By  Dipesh Brown RN              sodium chloride (NS) flush 10 mL       Admin Date  2022 Action  Given Dose  10 mL Route  IntraVENous Administered By  Dipesh Brown RN                      Ms. Tiffanie Maria tolerated the infusion, and had no complaints. Ms. Tiffanie Maria was discharged from [place] [address] in stable condition. Discharge Instructions provided to patient, patient verbalized understanding but denied the request for a copy of d/c instructions.      Future Appointments   Date Time Provider Peggy Khna   2022  9:20 AM LIV_[facility]_NURSE_RESEARCH LIVR BS AMB   11/21/2022 10:00 AM A3 PEDS [facility]   12/5/2022  8:20 AM LIV_[facility]_NURSE_RESEARCH LIVR BS AMB   12/5/2022  9:00 AM B4 PEDS [facility]   12/19/2022  8:20 AM LIV_[facility]_NURSE_RESEARCH LIVR BS [facility]   12/19/2022  9:00 AM B4 PEDS [facility]       John Nava RN  November 8, 2022  4:50 PM

## 2022-11-21 ENCOUNTER — APPOINTMENT (OUTPATIENT)
Dept: HEMATOLOGY | Age: 62
End: 2022-11-21

## 2022-11-21 ENCOUNTER — HOSPITAL ENCOUNTER (OUTPATIENT)
Dept: INFUSION THERAPY | Age: 62
Discharge: HOME | End: 2022-11-21
Payer: OTHER GOVERNMENT

## 2022-11-21 ENCOUNTER — APPOINTMENT (OUTPATIENT)
Dept: INFUSION THERAPY | Age: 62
End: 2022-11-21
Payer: OTHER GOVERNMENT

## 2022-11-21 VITALS
SYSTOLIC BLOOD PRESSURE: 152 MMHG | TEMPERATURE: 98.1 F | HEART RATE: 74 BPM | DIASTOLIC BLOOD PRESSURE: 86 MMHG | RESPIRATION RATE: 16 BRPM

## 2022-11-21 PROCEDURE — 96365 THER/PROPH/DIAG IV INF INIT: CPT

## 2022-11-21 PROCEDURE — 74011000250 HC RX REV CODE- 250: Performed by: INTERNAL MEDICINE

## 2022-11-21 PROCEDURE — 74011250636 HC RX REV CODE- 250/636: Performed by: INTERNAL MEDICINE

## 2022-11-21 RX ORDER — SODIUM CHLORIDE 9 MG/ML
25 INJECTION, SOLUTION INTRAVENOUS ONCE
Status: COMPLETED | OUTPATIENT
Start: 2022-11-21 | End: 2022-11-21

## 2022-11-21 RX ORDER — SODIUM CHLORIDE 0.9 % (FLUSH) 0.9 %
10 SYRINGE (ML) INJECTION AS NEEDED
Status: DISCONTINUED | OUTPATIENT
Start: 2022-11-21 | End: 2022-11-22 | Stop reason: HOSPADM

## 2022-11-21 RX ADMIN — SODIUM CHLORIDE 25 ML/HR: 9 INJECTION, SOLUTION INTRAVENOUS at 10:12

## 2022-11-21 RX ADMIN — SODIUM CHLORIDE, PRESERVATIVE FREE 10 ML: 5 INJECTION INTRAVENOUS at 10:10

## 2022-11-21 NOTE — PROGRESS NOTES
[address] [facility] [address] @ [facility] VISIT NOTE     1000 Patient arrives for INV ELLIE-MD-02 Dose 8 without acute problems. Please see connect care for complete assessment and education provided. Vital signs stable throughout and prior to discharge, Pt. Tolerated treatment well and discharged without incident. Patient is aware of next [place] appointment on 12/05/2022. Appointment card given to patient. Medications Verified by Saud Reaves RN &  Lilia Zarco RN:  1. INV MUNDO-02/Placebo over 1 hour  2.  NS IV Flush & [address]   Patient Vitals for the past 12 hrs:   Temp Pulse Resp BP   11/21/22 1159 98.1 °F (36.7 °C) 74 16 (!) 152/86   11/21/22 1130 -- -- 16 128/86   11/21/22 1001 97.9 °F (36.6 °C) 73 18 (!) 142/82

## 2022-11-23 DIAGNOSIS — L30.9 DERMATITIS: Primary | ICD-10-CM

## 2022-11-23 RX ORDER — TRIAMCINOLONE ACETONIDE 1 MG/G
CREAM TOPICAL AS NEEDED
Qty: 15 G | Refills: 1 | Status: SHIPPED | OUTPATIENT
Start: 2022-11-23

## 2022-12-05 ENCOUNTER — APPOINTMENT (OUTPATIENT)
Dept: HEMATOLOGY | Age: 62
End: 2022-12-05

## 2022-12-05 ENCOUNTER — HOSPITAL ENCOUNTER (OUTPATIENT)
Dept: INFUSION THERAPY | Age: 62
Discharge: HOME | End: 2022-12-05

## 2022-12-05 ENCOUNTER — APPOINTMENT (OUTPATIENT)
Dept: INFUSION THERAPY | Age: 62
End: 2022-12-05
Payer: OTHER GOVERNMENT

## 2022-12-05 VITALS
TEMPERATURE: 97.3 F | DIASTOLIC BLOOD PRESSURE: 40 MMHG | HEART RATE: 68 BPM | SYSTOLIC BLOOD PRESSURE: 148 MMHG | RESPIRATION RATE: 16 BRPM

## 2022-12-05 PROCEDURE — 96365 THER/PROPH/DIAG IV INF INIT: CPT

## 2022-12-05 NOTE — PROGRESS NOTES
[facility] Peds/Adult Note                       Date: 2022    Name: Wendy Markham    MRN: [id number]         : 1960    0905 Patient arrives for Research study infusion without acute problems. Please see Connect Care for complete assessment and education provided. Vital signs stable throughout and prior to discharge. Patient tolerated procedure well and was discharged without incident. Patient is aware of next [facility] appointment on  2022  Appointment card give to the Patient      Ms. Smith's vitals were reviewed prior to and after treatment. Patient Vitals for the past 12 hrs:   Temp Pulse Resp BP   22 1114 -- 68 16 (!) 148/40   22 1040 -- 63 16 (!) 149/73   22 0910 97.3 °F (36.3 °C) 74 16 (!) 153/80       Lab results:  No results found for this or any previous visit (from the past 12 hour(s)). Medications given:   Medications Administered       INV GR-MD-02 galactoarabino-rhamnogalacturonate/placebo in 0.9% sodium chloride 100 mL (+/- 10% base fluid volume) investigational infusion       Admin Date  2022 Action  New Bag Dose   Rate  100 mL/hr Route  IntraVENous Administered By  Annel Turner RN               Ms. Shaheen Shah tolerated the infusion, and had no complaints. Ms. Shaheen Shah was discharged from [place] [address] in stable condition. Discharge Instructions provided to patient, patient verbalized understanding but denied the request for a copy of d/c instructions.      Future Appointments   Date Time Provider Peggy Khan   2022  8:20 AM LIV_[facility]_NURSE_RESEARCH LIVR BS AMB   2022  9:00 AM B4 PEDS [facility]   1/3/2023  9:00 AM A3 [facility]EDS [facility]   2023  9:00 AM B4 [facility]EDS [facility]   2023  9:00 AM B4 [facility]   2023  9:00 AM B4 [facility]EDS [facility]   2023  9:00 AM FRANNY [facility]ARC[facility]OPIC [facility] 3/13/2023  9:00 AM B4 PEDS [facility]   3/27/2023  9:00 AM B4 PEDS [facility]   4/10/2023  9:00 AM B4 PEDS [facility]       Roly Chin RN  December 5, 2022  12:52 PM.3

## 2022-12-19 ENCOUNTER — APPOINTMENT (OUTPATIENT)
Dept: INFUSION THERAPY | Age: 62
End: 2022-12-19
Payer: OTHER GOVERNMENT

## 2022-12-19 ENCOUNTER — APPOINTMENT (OUTPATIENT)
Dept: HEMATOLOGY | Age: 62
End: 2022-12-19

## 2022-12-19 ENCOUNTER — HOSPITAL ENCOUNTER (OUTPATIENT)
Dept: INFUSION THERAPY | Age: 62
Discharge: HOME | End: 2022-12-19

## 2022-12-19 VITALS
SYSTOLIC BLOOD PRESSURE: 138 MMHG | HEART RATE: 73 BPM | DIASTOLIC BLOOD PRESSURE: 78 MMHG | TEMPERATURE: 97.3 F | RESPIRATION RATE: 18 BRPM

## 2022-12-19 PROCEDURE — 74011250636 HC RX REV CODE- 250/636: Performed by: INTERNAL MEDICINE

## 2022-12-19 PROCEDURE — 96365 THER/PROPH/DIAG IV INF INIT: CPT

## 2022-12-19 RX ORDER — SODIUM CHLORIDE 9 MG/ML
25 INJECTION, SOLUTION INTRAVENOUS ONCE
Status: COMPLETED | OUTPATIENT
Start: 2022-12-19 | End: 2022-12-19

## 2022-12-19 RX ADMIN — SODIUM CHLORIDE 25 ML/HR: 900 INJECTION, SOLUTION INTRAVENOUS at 09:25

## 2022-12-19 NOTE — PROGRESS NOTES
[address] [facility] @ [facility] VISIT NOTE     0915: Patient arrives for INV ELLIE-MD-02 without acute problems. Please see connect [facility] for complete assessment and education provided. Vital signs stable throughout and prior to discharge, Pt. Tolerated treatment well and discharged without incident. Patient is aware of next [address] [place] [address] appointment on 01/03/2023. Appointment card given to patient/parents.     Medications   Medications Administered       0.9% sodium chloride infusion       Admin Date  12/19/2022 Action  New Bag Dose  25 mL/hr Rate  25 mL/hr Route  IntraVENous Administered By  FÉLIX Bates-MD-02 galactoarabino-rhamnogalacturonate/placebo in 0.9% sodium chloride 100 mL (+/- 10% base fluid volume) investigational infusion       Admin Date  12/19/2022 Action  New Bag Dose   Rate  100 mL/hr Route  IntraVENous Administered By  Anam Landeros RN                      VITAL SIGNS   Patient Vitals for the past 12 hrs:   Temp Pulse Resp BP   12/19/22 1100 -- 73 18 138/78   12/19/22 0915 97.3 °F (36.3 °C) 79 20 130/72

## 2023-01-03 ENCOUNTER — HOSPITAL ENCOUNTER (OUTPATIENT)
Dept: INFUSION THERAPY | Age: 63
Discharge: HOME | End: 2023-01-03
Payer: OTHER GOVERNMENT

## 2023-01-03 ENCOUNTER — APPOINTMENT (OUTPATIENT)
Dept: HEMATOLOGY | Age: 63
End: 2023-01-03

## 2023-01-03 VITALS
SYSTOLIC BLOOD PRESSURE: 120 MMHG | HEART RATE: 80 BPM | DIASTOLIC BLOOD PRESSURE: 79 MMHG | RESPIRATION RATE: 18 BRPM | TEMPERATURE: 98.7 F

## 2023-01-03 PROCEDURE — 96365 THER/PROPH/DIAG IV INF INIT: CPT

## 2023-01-03 PROCEDURE — 74011250636 HC RX REV CODE- 250/636: Performed by: INTERNAL MEDICINE

## 2023-01-03 RX ORDER — SODIUM CHLORIDE 9 MG/ML
25 INJECTION, SOLUTION INTRAVENOUS CONTINUOUS
Status: DISCONTINUED | OUTPATIENT
Start: 2023-01-03 | End: 2023-01-04 | Stop reason: HOSPADM

## 2023-01-03 RX ADMIN — SODIUM CHLORIDE 25 ML/HR: 9 INJECTION, SOLUTION INTRAVENOUS at 09:05

## 2023-01-03 NOTE — PROGRESS NOTES
[facility] Peds/Adult Note                       Date: January 3, 2023    Name: Hayde Marques    MRN: [id number]         : 1960    0855 Patient arrives for Galectin clinical trial without acute problems. Please see [facility] for complete assessment and education provided. Vital signs stable throughout and prior to discharge. Patient tolerated procedure well and was discharged without incident. Patient is aware of next [facility] appointment on  2023  Appointment card give to the Patient      Ms. Joseph vitals were reviewed prior to and after treatment. Lab results:  No results found for this or any previous visit (from the past 12 hour(s)). Medications given:   Medications Administered       0.9% sodium chloride infusion       Admin Date  2023 Action  New Bag Dose  25 mL/hr Rate  25 mL/hr Route  IntraVENous Administered By  Katie Cerrato RN              INV GR-MD-02 galactoarabino-rhamnogalacturonate/placebo in 0.9% sodium chloride 100 mL (+/- 10% base fluid volume) investigational infusion       Admin Date  2023 Action  New Bag Dose   Rate  100 mL/hr Route  IntraVENous Administered By  Katie Cerrato RN               Ms. Jennifer Sousa tolerated the infusion, and had no complaints. Ms. Jennifer Sousa was discharged from [place] [address] in stable condition. Discharge Instructions provided to patient, patient verbalized understanding but denied the request for a copy of d/c instructions.      Future Appointments   Date Time Provider Peggy Khan   2023  8:20 AM LIV_SMH_NURSE_RESEARCH LIVR  AMB   2023  9:00 AM B4 PEDS [facility]   2023  8:30 AM Savita Corbett NP LIVR  AMB   2023  9:00 AM B4 [facility]   2023  8:20 AM LIV_SMH_NURSE_RESEARCH LIVR  AMB   2023  9:00 AM B4 [facility]   2023  8:20 AM LIV_SMH_NURSE_RESEARCH LIVR BS AMB   2023  9:00 AM B4 PEDS [facility]   3/13/2023  8:20 AM LIV_SMH_NURSE_RESEARCH LIVR BS AMB   3/13/2023  9:00 AM B4 PEDS [facility]   3/27/2023  8:20 AM LIV_SMH_NURSE_RESEARCH LIVR BS AMB   3/27/2023  9:00 AM B4 PEDS [facility]   4/10/2023  8:20 AM LIV_SMH_NURSE_RESEARCH LIVR BS [facility]   4/10/2023  9:00 AM B4 PEDS [facility]       Kimmy Rubio RN  January 3, 2023  9:32 AM

## 2023-01-16 ENCOUNTER — HOSPITAL ENCOUNTER (OUTPATIENT)
Dept: INFUSION THERAPY | Age: 63
Discharge: HOME | End: 2023-01-16
Payer: OTHER GOVERNMENT

## 2023-01-16 ENCOUNTER — APPOINTMENT (OUTPATIENT)
Dept: HEMATOLOGY | Age: 63
End: 2023-01-16

## 2023-01-16 VITALS
SYSTOLIC BLOOD PRESSURE: 124 MMHG | RESPIRATION RATE: 16 BRPM | OXYGEN SATURATION: 98 % | HEART RATE: 74 BPM | DIASTOLIC BLOOD PRESSURE: 91 MMHG | TEMPERATURE: 97.5 F

## 2023-01-16 PROCEDURE — 96365 THER/PROPH/DIAG IV INF INIT: CPT

## 2023-01-16 PROCEDURE — 74011250636 HC RX REV CODE- 250/636: Performed by: INTERNAL MEDICINE

## 2023-01-16 RX ORDER — SODIUM CHLORIDE 9 MG/ML
25 INJECTION, SOLUTION INTRAVENOUS ONCE
Status: COMPLETED | OUTPATIENT
Start: 2023-01-16 | End: 2023-01-16

## 2023-01-16 RX ADMIN — SODIUM CHLORIDE 25 ML/HR: 900 INJECTION, SOLUTION INTRAVENOUS at 08:58

## 2023-01-26 DIAGNOSIS — Z00.6 EXAMINATION OF PARTICIPANT IN CLINICAL TRIAL: Primary | ICD-10-CM

## 2023-01-30 ENCOUNTER — OFFICE VISIT (OUTPATIENT)
Dept: HEMATOLOGY | Age: 63
End: 2023-01-30

## 2023-01-30 ENCOUNTER — HOSPITAL ENCOUNTER (OUTPATIENT)
Dept: NON INVASIVE DIAGNOSTICS | Age: 63
Discharge: HOME | End: 2023-01-30

## 2023-01-30 ENCOUNTER — HOSPITAL ENCOUNTER (OUTPATIENT)
Dept: INFUSION THERAPY | Age: 63
Discharge: HOME | End: 2023-01-30
Payer: OTHER GOVERNMENT

## 2023-01-30 ENCOUNTER — HOSPITAL ENCOUNTER (OUTPATIENT)
Dept: ULTRASOUND IMAGING | Age: 63
Discharge: HOME | End: 2023-01-30
Attending: NURSE PRACTITIONER

## 2023-01-30 ENCOUNTER — TRANSCRIBE ORDER (OUTPATIENT)
Dept: NON INVASIVE DIAGNOSTICS | Age: 63
End: 2023-01-30

## 2023-01-30 VITALS
HEART RATE: 72 BPM | SYSTOLIC BLOOD PRESSURE: 160 MMHG | DIASTOLIC BLOOD PRESSURE: 89 MMHG | OXYGEN SATURATION: 98 % | RESPIRATION RATE: 16 BRPM | TEMPERATURE: 97.9 F

## 2023-01-30 DIAGNOSIS — Z00.6 EXAMINATION OF PARTICIPANT IN CLINICAL TRIAL: ICD-10-CM

## 2023-01-30 DIAGNOSIS — Z00.6 EXAMINATION OF PARTICIPANT IN CLINICAL TRIAL: Primary | ICD-10-CM

## 2023-01-30 DIAGNOSIS — Z00.6 RESEARCH EXAM: Primary | ICD-10-CM

## 2023-01-30 PROCEDURE — 76700 US EXAM ABDOM COMPLETE: CPT

## 2023-01-30 PROCEDURE — 99213 OFFICE O/P EST LOW 20 MIN: CPT | Performed by: NURSE PRACTITIONER

## 2023-01-30 PROCEDURE — 93005 ELECTROCARDIOGRAM TRACING: CPT

## 2023-01-30 PROCEDURE — 96365 THER/PROPH/DIAG IV INF INIT: CPT

## 2023-01-30 NOTE — PROGRESS NOTES
[facility], MD, FACP, [place], [place]      TUNDE Lara, [facility]-BC   Cat Fraser, [facility]   Janine Vegas, FNP-ADI Prabhakar, FNP-C    Jaynee Castleman, [facility]       Rocio Sotelo [facility] 136    at [address], [address] [place] [address]    1400 [facility] 22.    [phone]    FAX: [address], [address]    [phone]    FAX: [address] NOTE    Tess Kowalski is a 58 y.o. 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 The patient was seen today for infusion #13 visit for the [facility] clinical trial for patients with WILD cirrhosis. The study is an infused therapy of Belapectin (GR-MD-02) versus placebo given every 2 weeks and evaluating is effects on fibrosis. Today's visit was conducted per the study protocol. The patient was asked if any symptoms, side effects or adverse events have occurred since the last study visit. A physical examination was performed per protocol, this was normal - no new findings. All symptoms reported by the patient and the findings of the physical examination were recorded in the clinical trial documents. Symptoms of clinical significance were:  None. She is tolerating infusions well with no localized reaction. Findings on physical examination of clinical significance were: None    Patient has underlying cirrhosis and is in need of [address] screening. [address] screening is being performed at appropriate intervals as part of the clinical trial.  The next imaging will be due at week 24. AFP 15.4/AFPL3 7.0.     HISTOLOGY:  1/2022. FibroScan performed at [facility]. EkPa was 30.2. IQR/med 12%. .  The results suggested a fibrosis level of F4. The CAP score suggests there is no significant hepatic steatosis. 8/02/2022. Liver Biopsy. Macrovesicular steatosis involving 5% of the parenchyma, ballooning present, lobular and portal inflammation and cirrhosis. ZACHARIAH 4.   8/2022. [facility]. EkPa was 61.0. IQR/med 6%. . The results suggested a fibrosis level of F4. The CAP score suggests there is hepatic steatosis. RADIOLOGY:  7/2022. Ultrasound of liver. Echogenic consistent with cirrhosis. No dilated bile ducts. No ascites. Mild ascites. Solid hypoechoic mass in the left lobe measuring 1.1 x 0.8 x 0.9 cm. Adjacent to this is a 0.8 x 0.8 x 0.8 cm mass. 7/2022. MRI of liver. Cirrhotic appearing liver. No suspicious mass or lesion. 1.5 x 1.4 cm nonenhancing cyst in the upper central liver. Left lobe hyperintense lesions largest 4 mm likely a cyst. There is a 5 mm cyst in the right kidney and a 7 mm cyst in the left kidney. Follow-up per protocol. Documentation reviewed and updated to reflect current, accurate patient information.     KATI Bonner-ADI  Liver [facility] 59, [facility] [place]  22.  [phone]  1017 [facility]

## 2023-01-30 NOTE — PROGRESS NOTES
[address] [facility] @ [facility] VISIT NOTE     9010: Patient arrives for Galectin Clinical Trial  without acute problems. Please see [facility] for complete assessment and education provided. Vital signs stable throughout and prior to discharge. Pt. Tolerated treatment well and discharged without incident. PIV placed and removed upon discharge. Patient is aware of next [facility] appointment on 02/13/2023. Medications   Medications Administered       INV GR-MD-02 galactoarabino-rhamnogalacturonate/placebo in 0.9% sodium chloride 100 mL (+/- 10% base fluid volume) investigational infusion       Admin Date  01/30/2023 Action  New Bag Dose   Rate  100 mL/hr Route  IntraVENous Administered By  Andreia Limon RN                   VITAL SIGNS   Patient Vitals for the past 12 hrs:   Temp Pulse Resp BP SpO2   01/30/23 1159 -- 72 16 (!) 160/89 --   01/30/23 1023 97.9 °F (36.6 °C) 76 18 (!) 151/93 98 %        Labs collected by research RN.

## 2023-01-31 LAB
ATRIAL RATE: 76 BPM
CALCULATED P AXIS, ECG09: 69 DEGREES
CALCULATED R AXIS, ECG10: 7 DEGREES
CALCULATED T AXIS, ECG11: 29 DEGREES
DIAGNOSIS, 93000: NORMAL
P-R INTERVAL, ECG05: 154 MS
Q-T INTERVAL, ECG07: 398 MS
QRS DURATION, ECG06: 76 MS
QTC CALCULATION (BEZET), ECG08: 447 MS
VENTRICULAR RATE, ECG03: 76 BPM

## 2023-02-13 ENCOUNTER — OFFICE VISIT (OUTPATIENT)
Dept: HEMATOLOGY | Age: 63
End: 2023-02-13

## 2023-02-13 ENCOUNTER — PATIENT MESSAGE (OUTPATIENT)
Dept: HEMATOLOGY | Age: 63
End: 2023-02-13

## 2023-02-13 ENCOUNTER — HOSPITAL ENCOUNTER (OUTPATIENT)
Dept: INFUSION THERAPY | Age: 63
Discharge: HOME | End: 2023-02-13
Payer: OTHER GOVERNMENT

## 2023-02-13 VITALS
DIASTOLIC BLOOD PRESSURE: 83 MMHG | TEMPERATURE: 97.6 F | HEART RATE: 77 BPM | RESPIRATION RATE: 18 BRPM | SYSTOLIC BLOOD PRESSURE: 145 MMHG

## 2023-02-13 DIAGNOSIS — Z00.6 RESEARCH EXAM: Primary | ICD-10-CM

## 2023-02-13 PROCEDURE — 74011000250 HC RX REV CODE- 250: Performed by: INTERNAL MEDICINE

## 2023-02-13 PROCEDURE — 74011250636 HC RX REV CODE- 250/636: Performed by: INTERNAL MEDICINE

## 2023-02-13 PROCEDURE — 96365 THER/PROPH/DIAG IV INF INIT: CPT

## 2023-02-13 RX ORDER — SODIUM CHLORIDE 9 MG/ML
25 INJECTION, SOLUTION INTRAVENOUS AS NEEDED
Status: DISCONTINUED | OUTPATIENT
Start: 2023-02-13 | End: 2023-02-14 | Stop reason: HOSPADM

## 2023-02-13 RX ORDER — SODIUM CHLORIDE 9 MG/ML
10 INJECTION INTRAVENOUS AS NEEDED
Status: DISCONTINUED | OUTPATIENT
Start: 2023-02-13 | End: 2023-02-14 | Stop reason: HOSPADM

## 2023-02-13 RX ADMIN — SODIUM CHLORIDE 25 ML/HR: 9 INJECTION, SOLUTION INTRAVENOUS at 09:50

## 2023-02-13 RX ADMIN — SODIUM CHLORIDE, PRESERVATIVE FREE 10 ML: 5 INJECTION INTRAVENOUS at 09:46

## 2023-02-13 NOTE — PROGRESS NOTES
[facility] Peds/Adult Note                       Date: 2023    Name: Nataliya Montenegro    MRN: [id number]         : 1960    0930 Patient arrives for Research infusion without acute problems. Please see Connect [facility] for complete assessment and education provided. Vital signs stable throughout and prior to discharge. Patient tolerated procedure well and was discharged without incident. Patient is aware of next [facility] appointment on 23. Appointment card give to the Patient      Ms. Joseph vitals were reviewed prior to and after treatment. Patient Vitals for the past 12 hrs:   Temp Pulse Resp BP   23 1115 -- 77 18 (!) 145/83   23 0937 97.6 °F (36.4 °C) 80 16 (!) 147/90         Lab results were obtained and reviewed. No results found for this or any previous visit (from the past 12 hour(s)). Medications given:   Medications Administered       0.9% sodium chloride infusion       Admin Date  2023 Action  New Bag Dose  25 mL/hr Rate  25 mL/hr Route  IntraVENous Administered By  Norris Guan RN              0.9% sodium chloride injection 10 mL       Admin Date  2023 Action  Given Dose  10 mL Route  IntraVENous Administered By  Norris Guan RN              INV GR-MD-02 galactoarabino-rhamnogalacturonate/placebo in 0.9% sodium chloride 100 mL (+/- 10% base fluid volume) investigational infusion          Admin Date  2023 Action  New Bag Dose   Rate  100 mL/hr Route  IntraVENous Administered By  Norris Guan RN                      Ms. Ifrah Do tolerated the infusion, and had no complaints. Ms. Ifrah Do was discharged from [place] [address] in stable condition. Discharge Instructions provided to patient.     Future Appointments   Date Time Provider Peggy Khan   2023  8:20 AM LIV_[facility]_NURSE_RESEARCH LIVR BS AMB   2023  9:00 AM FRANNY GRACIAOPIC [facility]   3/13/2023  8:20 AM LIV_[facility]_NURSE_RESEARCH Ryne Lyle BS AMB   3/13/2023  9:00 AM [facility]   3/27/2023  8:20 AM LIV_[facility]_NURSE_RESEARCH LIVR BS AMB   3/27/2023  9:00 AM B4 PEDS FASTRACK [facility] H   4/10/2023  8:20 AM LIV_[facility]_NURSE_RESEARCH LIVR BS AMB   4/10/2023  9:00 AM B4 PEDS FASTRACK RCHPOPIC Castro Montague, RN  February 13, 2023  11:31 AM

## 2023-02-13 NOTE — PROGRESS NOTES
[facility], MD, FACP, [place], [place]      TUNDE Ayala, [facility]-BC   Thelma Gardner, [facility]-   Jordan Marcelino, FNP-ADI Mahmood, FNP-C    Shivani Chavis, [facility]       [facility]    at [address], [facility] Collin Lopez  22.    [phone]    FAX: [address] [place] [address], [address]    [phone]    FAX: [address] NOTE    John French is a 58 y.o. 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 The patient was seen today for infusion #14 visit for the [facility] clinical trial for patients with WILD cirrhosis. The study is an infused therapy of Belapectin (GR-MD-02) versus placebo given every 2 weeks and evaluating is effects on fibrosis. Today's visit was conducted per the study protocol. The patient was asked if any symptoms, side effects or adverse events have occurred since the last study visit. A physical examination was performed per protocol, this was normal - no new findings. All symptoms reported by the patient and the findings of the physical examination were recorded in the clinical trial documents. Symptoms of clinical significance were:  Rash, this is chronic but has been worse lately, will go to PCP for steroid pack. Findings on physical examination of clinical significance were: Deferred    Patient has underlying cirrhosis and is in need of [facility] [address] screening. [address] screening is being performed at appropriate intervals as part of the clinical trial.  The next imaging will be due at week 24. AFP 15.4/AFPL3 7.0.     HISTOLOGY:  1/2022. FibroScan performed at [facility]. EkPa was 30.2. IQR/med 12%. .  The results suggested a fibrosis level of F4. The CAP score suggests there is no significant hepatic steatosis. 8/02/2022. Liver Biopsy. Macrovesicular steatosis involving 5% of the parenchyma, ballooning present, lobular and portal inflammation and cirrhosis. ZACHARIAH 4.   8/2022. FibroScan performed at [address]. EkPa was 61.0. IQR/med 6%. . The results suggested a fibrosis level of F4. The CAP score suggests there is hepatic steatosis. 2/2023. FibroScan performed at [address]. EkPa was 23.1. IQR/med 13%. . The results suggested a fibrosis level of F4. The CAP score suggests there is no significant hepatic steatosis. RADIOLOGY:  7/2022. Ultrasound of liver. Echogenic consistent with cirrhosis. No dilated bile ducts. No ascites. Mild ascites. Solid hypoechoic mass in the left lobe measuring 1.1 x 0.8 x 0.9 cm. Adjacent to this is a 0.8 x 0.8 x 0.8 cm mass. 7/2022. MRI of liver. Cirrhotic appearing liver. No suspicious mass or lesion. 1.5 x 1.4 cm nonenhancing cyst in the upper central liver. Left lobe hyperintense lesions largest 4 mm likely a cyst. There is a 5 mm cyst in the right kidney and a 7 mm cyst in the left kidney. Follow-up per protocol. Documentation reviewed and updated to reflect current, accurate patient information.       SUSHILA Churchill  Liver [facility] 59, [address] [facility] [address]  22.  [phone]  [address] [place] [address]

## 2023-02-27 ENCOUNTER — APPOINTMENT (OUTPATIENT)
Dept: HEMATOLOGY | Age: 63
End: 2023-02-27

## 2023-02-27 ENCOUNTER — HOSPITAL ENCOUNTER (OUTPATIENT)
Dept: INFUSION THERAPY | Age: 63
Discharge: HOME | End: 2023-02-27
Payer: OTHER GOVERNMENT

## 2023-02-27 VITALS
SYSTOLIC BLOOD PRESSURE: 149 MMHG | RESPIRATION RATE: 18 BRPM | OXYGEN SATURATION: 98 % | HEART RATE: 78 BPM | DIASTOLIC BLOOD PRESSURE: 88 MMHG | TEMPERATURE: 97.7 F

## 2023-02-27 PROCEDURE — 74011250636 HC RX REV CODE- 250/636: Performed by: INTERNAL MEDICINE

## 2023-02-27 PROCEDURE — 96365 THER/PROPH/DIAG IV INF INIT: CPT

## 2023-02-27 PROCEDURE — 74011000250 HC RX REV CODE- 250: Performed by: INTERNAL MEDICINE

## 2023-02-27 RX ORDER — SODIUM CHLORIDE 9 MG/ML
25 INJECTION, SOLUTION INTRAVENOUS ONCE
Status: COMPLETED | OUTPATIENT
Start: 2023-02-27 | End: 2023-02-27

## 2023-02-27 RX ORDER — SODIUM CHLORIDE 9 MG/ML
10 INJECTION INTRAVENOUS AS NEEDED
Status: DISCONTINUED | OUTPATIENT
Start: 2023-02-27 | End: 2023-02-28 | Stop reason: HOSPADM

## 2023-02-27 RX ADMIN — SODIUM CHLORIDE, PRESERVATIVE FREE 10 ML: 5 INJECTION INTRAVENOUS at 09:12

## 2023-02-27 RX ADMIN — SODIUM CHLORIDE 25 ML/HR: 9 INJECTION, SOLUTION INTRAVENOUS at 09:15

## 2023-02-27 NOTE — PROGRESS NOTES
[address] [facility] @ [facility] VISIT NOTE     0900 Patient arrives for ROLANDO ROBLEDO Dose #15 without acute problems. Please see connect care for complete assessment and education provided. Vital signs stable throughout and prior to discharge, Pt. Tolerated treatment well and discharged without incident. Patient is aware of next [facility] appointment on 03/13/2023. Appointment card given to patient. Medications Verified by Jeffery Thrasher April 708 [facility] RN  :  1. ROLANDO ROBLEDO/Placebo over 1 hour  2.  NS IV Flush & [facility]   Patient Vitals for the past 12 hrs:   Temp Pulse Resp BP SpO2   02/27/23 1044 97.7 °F (36.5 °C) 78 18 (!) 149/88 98 %   02/27/23 0903 97.8 °F (36.6 °C) 87 18 133/84 97 %

## 2023-03-13 ENCOUNTER — HOSPITAL ENCOUNTER (OUTPATIENT)
Dept: INFUSION THERAPY | Age: 63
Discharge: HOME | End: 2023-03-13
Payer: OTHER GOVERNMENT

## 2023-03-13 ENCOUNTER — APPOINTMENT (OUTPATIENT)
Dept: HEMATOLOGY | Age: 63
End: 2023-03-13

## 2023-03-13 VITALS
SYSTOLIC BLOOD PRESSURE: 129 MMHG | TEMPERATURE: 97.3 F | RESPIRATION RATE: 18 BRPM | HEART RATE: 70 BPM | DIASTOLIC BLOOD PRESSURE: 77 MMHG

## 2023-03-13 PROCEDURE — 74011000250 HC RX REV CODE- 250: Performed by: INTERNAL MEDICINE

## 2023-03-13 PROCEDURE — 74011250636 HC RX REV CODE- 250/636: Performed by: INTERNAL MEDICINE

## 2023-03-13 PROCEDURE — 96365 THER/PROPH/DIAG IV INF INIT: CPT

## 2023-03-13 RX ORDER — SODIUM CHLORIDE 9 MG/ML
25 INJECTION, SOLUTION INTRAVENOUS AS NEEDED
Status: DISCONTINUED | OUTPATIENT
Start: 2023-03-13 | End: 2023-03-14 | Stop reason: HOSPADM

## 2023-03-13 RX ORDER — SODIUM CHLORIDE 9 MG/ML
10 INJECTION INTRAVENOUS AS NEEDED
Status: DISCONTINUED | OUTPATIENT
Start: 2023-03-13 | End: 2023-03-14 | Stop reason: HOSPADM

## 2023-03-13 RX ADMIN — SODIUM CHLORIDE, PRESERVATIVE FREE 10 ML: 5 INJECTION INTRAVENOUS at 09:08

## 2023-03-13 RX ADMIN — SODIUM CHLORIDE 25 ML/HR: 9 INJECTION, SOLUTION INTRAVENOUS at 09:09

## 2023-03-13 NOTE — PROGRESS NOTES
[facility] Peds/Adult Note                       Date: 2023    Name: Guillermina Becerra    MRN: [id number]         : 1960    0900 Patient arrives for Research infusion without acute problems. Please see Connect [facility] for complete assessment and education provided. Vital signs stable throughout and prior to discharge. Patient tolerated procedure well and was discharged without incident. Patient is aware of next [facility] appointment on 3/27/23. Appointment card given to the Patient      Ms. Smith's vitals were reviewed prior to and after treatment. Patient Vitals for the past 12 hrs:   Temp Pulse Resp BP   23 1111 -- 70 18 129/77   23 1057 -- 70 18 126/74   23 0857 97.3 °F (36.3 °C) 78 18 133/67     Patient declined 30 minutes post-infusion observation period and vitals. Lab results were obtained and reviewed. No results found for this or any previous visit (from the past 12 hour(s)). Medications given:   Medications Administered       0.9% sodium chloride infusion       Admin Date  2023 Action  New Bag Dose  25 mL/hr Rate  25 mL/hr Route  IntraVENous Administered By  Karyna Dominguez RN              0.9% sodium chloride injection 10 mL       Admin Date  2023 Action  Given Dose  10 mL Route  IntraVENous Administered By  Karyna Dominguez RN              INV GR-MD-02 galactoarabino-rhamnogalacturonate/placebo in 0.9% sodium chloride 100 mL (+/- 10% base fluid volume) investigational infusion       Admin Date  2023 Action  New Bag Dose   Rate  100 mL/hr Route  IntraVENous Administered By  Karyna Dominguez RN                      Ms. Joe Uribe tolerated the infusion, and had no complaints. Ms. Joe Uribe was discharged from [place] [address] in stable condition. Discharge Instructions provided to patient.     Future Appointments   Date Time Provider Peggy Khan   3/27/2023  8:20 AM LIV_[facility]_NURSE_RESEARCH LIVR REBECA ADAMES   3/27/2023  9:00 AM B4 PEDS [facility]   4/10/2023  8:20 AM LIV_[facility]_NURSE_RESEARCH LIVR BS AMB   4/10/2023  9:00 AM B4 PEDS FASTR[facility]   4/24/2023  8:00 AM Prentis Bras, NP LIVR BS AMB   4/24/2023  9:00 AM B4 PEDS [facility]   5/8/2023  8:20 AM LIV_[facility]_NURSE_RESEARCH LIVR BS AMB   5/8/2023  9:00 AM A3 PEDS FASTR[facility]   5/22/2023  8:20 AM LIV_[facility]_NURSE_RESEARCH LIVR BS AMB   5/22/2023  9:00 AM B4 PEDS [facility]   6/5/2023  8:20 AM LIV_[facility]_NURSE_RESEARCH LIVR BS AMB   6/5/2023  9:00 AM B4 PEDS [facility]   6/19/2023  9:00 AM B4 PEDS [facility]   7/3/2023  9:00 AM B4 PEDS [facility]   7/17/2023  9:00 AM B4 PEDS [facility]   7/31/2023  9:00 AM A3 PEDS [facility]STR[facility]   8/14/2023  9:00 AM B4 PEDS [facility]   8/28/2023  9:00 AM B4 PEDS [facility]   9/11/2023  9:00 AM B4 PEDS [facility]   9/25/2023  9:00 AM B4 PEDS [facility]   10/9/2023  9:00 AM B4 PEDS FASTRACK RCHPOPIC [address], RN  March 13, 2023  11:31 AM

## 2023-03-27 ENCOUNTER — HOSPITAL ENCOUNTER (OUTPATIENT)
Dept: INFUSION THERAPY | Age: 63
Discharge: HOME | End: 2023-03-27
Payer: OTHER GOVERNMENT

## 2023-03-27 ENCOUNTER — APPOINTMENT (OUTPATIENT)
Dept: HEMATOLOGY | Age: 63
End: 2023-03-27

## 2023-03-27 VITALS
DIASTOLIC BLOOD PRESSURE: 82 MMHG | SYSTOLIC BLOOD PRESSURE: 144 MMHG | RESPIRATION RATE: 18 BRPM | HEART RATE: 82 BPM | TEMPERATURE: 97.6 F

## 2023-03-27 PROCEDURE — 74011000250 HC RX REV CODE- 250: Performed by: INTERNAL MEDICINE

## 2023-03-27 PROCEDURE — 74011250636 HC RX REV CODE- 250/636: Performed by: INTERNAL MEDICINE

## 2023-03-27 PROCEDURE — 96365 THER/PROPH/DIAG IV INF INIT: CPT

## 2023-03-27 RX ORDER — SODIUM CHLORIDE 9 MG/ML
25 INJECTION, SOLUTION INTRAVENOUS AS NEEDED
Status: DISCONTINUED | OUTPATIENT
Start: 2023-03-27 | End: 2023-03-28 | Stop reason: HOSPADM

## 2023-03-27 RX ORDER — SODIUM CHLORIDE 0.9 % (FLUSH) 0.9 %
5-10 SYRINGE (ML) INJECTION AS NEEDED
Status: DISCONTINUED | OUTPATIENT
Start: 2023-03-27 | End: 2023-03-28 | Stop reason: HOSPADM

## 2023-03-27 RX ADMIN — SODIUM CHLORIDE, PRESERVATIVE FREE 10 ML: 5 INJECTION INTRAVENOUS at 09:36

## 2023-03-27 RX ADMIN — SODIUM CHLORIDE 25 ML/HR: 9 INJECTION, SOLUTION INTRAVENOUS at 09:36

## 2023-03-27 NOTE — PROGRESS NOTES
[facility] Peds/Adult Note                       Date: 2023    Name: Giovani Villegas    MRN: [id number]         : 1960    0920 Patient arrives for Research Infusion without acute problems. Please see Connect Care for complete assessment and education provided. Vital signs stable throughout and prior to discharge. Patient tolerated procedure well and was discharged without incident. Patient is aware of next [facility] appointment on 04/10/2023. Appointment card give to the Patient    Ms. Smith's vitals were reviewed prior to and after treatment. Patient Vitals for the past 12 hrs:   Temp Pulse Resp BP   23 1056 -- 82 -- (!) 144/82   23 0922 97.6 °F (36.4 °C) 83 18 139/73     Medications given:   Medications Administered       0.9% sodium chloride infusion       Admin Date  2023 Action  New Bag Dose  25 mL/hr Rate  25 mL/hr Route  IntraVENous Administered By  Agustín Eason RN              INV GR-MD-02 galactoarabino-rhamnogalacturonate/placebo in 0.9% sodium chloride 100 mL (+/- 10% base fluid) investigational infusion       Admin Date  2023 Action  New Bag Dose   Rate  100 mL/hr Route  IntraVENous Administered By  Agustín Eason RN              sodium chloride (NS) flush 5-10 mL       Admin Date  2023 Action  Given Dose  10 mL Route  IntraVENous Administered By  Agustín Eason RN                  Ms. Luisa Pichardo tolerated the infusion, and had no complaints. Ms. Luisa Pichardo was discharged from [place] [address] in stable condition.      Future Appointments   Date Time Provider Peggy Khan   4/10/2023  8:20 AM LIV_[facility]_NURSE_RESEARCH LIVR BS AMB   4/10/2023  9:00 AM B4 PEDS FASTRACK [facility]   2023  8:00 AM Jonny Krause NP LIVMELISA BS AMB   2023  9:00 AM B4 PEDS [facility]   2023  8:20 AM LIV_[facility]_NURSE_RESEARCH LIVR BS AMB   2023  9:00 AM A3 PEDS [facility]   2023  8:20 AM LIV_[facility]_NURSE_RESEARCH LIVR BS AMB   5/22/2023  9:00 AM B4 PEDS [facility]   6/5/2023  8:20 AM LIV_[facility]_NURSE_RESEARCH LIVR BS AMB   6/5/2023  9:00 AM B4 PEDS [facility]   6/19/2023  9:00 AM B4 PEDS [facility]   7/3/2023  9:00 AM B4 PEDS [facility]   7/17/2023  9:00 AM B4 PEDS [facility]   7/31/2023  9:00 AM A3 PEDS [facility]   8/14/2023  9:00 AM B4 PEDS [facility]   8/28/2023  9:00 AM B4 PEDS [facility]   9/11/2023  9:00 AM B4 PEDS [facility]   9/25/2023  9:00 AM B4 PEDS [facility]   10/9/2023  9:00 AM B4 PEDS FASTRACK RCHPOPIC FÉLIX Love  March 27, 2023  11:25 AM

## 2023-04-10 ENCOUNTER — HOSPITAL ENCOUNTER (OUTPATIENT)
Dept: INFUSION THERAPY | Age: 63
Discharge: HOME | End: 2023-04-10
Payer: OTHER GOVERNMENT

## 2023-04-10 VITALS
TEMPERATURE: 97.4 F | RESPIRATION RATE: 16 BRPM | DIASTOLIC BLOOD PRESSURE: 84 MMHG | SYSTOLIC BLOOD PRESSURE: 132 MMHG | HEART RATE: 74 BPM

## 2023-04-10 PROCEDURE — 74011250636 HC RX REV CODE- 250/636: Performed by: INTERNAL MEDICINE

## 2023-04-10 PROCEDURE — 96365 THER/PROPH/DIAG IV INF INIT: CPT

## 2023-04-10 RX ORDER — SODIUM CHLORIDE 9 MG/ML
25 INJECTION, SOLUTION INTRAVENOUS CONTINUOUS
Status: DISCONTINUED | OUTPATIENT
Start: 2023-04-10 | End: 2023-04-11 | Stop reason: HOSPADM

## 2023-04-10 RX ADMIN — SODIUM CHLORIDE 25 ML/HR: 9 INJECTION, SOLUTION INTRAVENOUS at 09:20

## 2023-04-10 NOTE — PROGRESS NOTES
[facility] Peds/Adult Note                       Date: April 10, 2023    Name: Cheyenne Stout    MRN: [id number]         : 1960    1000 Patient arrives for Clinical trial medication without acute problems. Please see [facility] for complete assessment and education provided. Vital signs stable throughout and prior to discharge. Patient tolerated procedure well and was discharged without incident. Patient is aware of next [facility] appointment on  2023  Appointment card give to the Patient      Ms. Smith's vitals were reviewed prior to and after treatment. Patient Vitals for the past 12 hrs:   Temp Pulse Resp BP   04/10/23 1040 -- 74 16 132/84   04/10/23 0905 97.4 °F (36.3 °C) 79 18 130/81     Lab results:  No results found for this or any previous visit (from the past 12 hour(s)). Medications given:   Medications Administered       0.9% sodium chloride infusion       Admin Date  04/10/2023 Action  New Bag Dose  25 mL/hr Rate  25 mL/hr Route  IntraVENous Administered By  Tomas Mae RN              INV GR-MD-02 galactoarabino-rhamnogalacturonate/placebo in 0.9% sodium chloride 100 mL (+/- 10% base fluid volume) investigational infusion       Admin Date  04/10/2023 Action  New Bag Dose   Rate  100 mL/hr Route  IntraVENous Administered By  Tomas Mae RN             Ms. Rafi Guzman tolerated the infusion, and had no complaints. Ms. Rafi Guzman was discharged from [place] [address] in stable condition. Discharge Instructions provided to patient, patient verbalized understanding but denied the request for a copy of d/c instructions.      Future Appointments   Date Time Provider Peggy Khan   2023  8:00 AM Peter Archibald NP LIVR BS AMB   2023  9:00 AM B4 PEDS [facility]   2023  8:20 AM LIV_[facility]_NURSE_RESEARCH LIVR REBECA AMB   2023  9:00 AM A3 PEDS FASTR[facility]   2023  8:20 AM LIV_[facility]_NURSE_RESEARCH LIVR REBECA AMB 5/22/2023  9:00 AM B4 PEDS [facility]   6/5/2023  8:20 AM LIV_[facility]_NURSE_RESEARCH LIVR BS AMB   6/5/2023  9:00 AM B4 PEDS FA[facility]   6/19/2023  9:00 AM B4 PEDS FASTR[facility]   7/3/2023  9:00 AM B4 PEDS [facility]   7/17/2023  9:00 AM B4 PEDS FA[facility]   7/31/2023  9:00 AM A3 PEDS FASTR[facility]   8/14/2023  9:00 AM B4 PEDS FA[facility]   8/28/2023  9:00 AM B4 PEDS FA[facility]   9/11/2023  9:00 AM B4 PEDS FA[facility]   9/25/2023  9:00 AM B4 PEDS [facility]   10/9/2023  9:00 AM B4 PEDS [facility]       Sawyer Jaquez RN  April 10, 2023  10:55 AM

## 2023-04-24 ENCOUNTER — HOSPITAL ENCOUNTER (OUTPATIENT)
Dept: INFUSION THERAPY | Age: 63
Discharge: HOME | End: 2023-04-24
Payer: OTHER GOVERNMENT

## 2023-04-24 ENCOUNTER — OFFICE VISIT (OUTPATIENT)
Dept: HEMATOLOGY | Age: 63
End: 2023-04-24

## 2023-04-24 VITALS
TEMPERATURE: 98 F | RESPIRATION RATE: 18 BRPM | DIASTOLIC BLOOD PRESSURE: 76 MMHG | SYSTOLIC BLOOD PRESSURE: 136 MMHG | HEART RATE: 71 BPM

## 2023-04-24 DIAGNOSIS — Z00.6 RESEARCH EXAM: Primary | ICD-10-CM

## 2023-04-24 PROCEDURE — 96365 THER/PROPH/DIAG IV INF INIT: CPT

## 2023-04-24 PROCEDURE — 74011000250 HC RX REV CODE- 250: Performed by: INTERNAL MEDICINE

## 2023-04-24 PROCEDURE — 99214 OFFICE O/P EST MOD 30 MIN: CPT | Performed by: NURSE PRACTITIONER

## 2023-04-24 PROCEDURE — 74011250636 HC RX REV CODE- 250/636: Performed by: INTERNAL MEDICINE

## 2023-04-24 RX ORDER — LEVOCETIRIZINE DIHYDROCHLORIDE 5 MG/1
5 TABLET, FILM COATED ORAL
COMMUNITY

## 2023-04-24 RX ORDER — METRONIDAZOLE 7.5 MG/G
0.75 CREAM TOPICAL 2 TIMES DAILY
COMMUNITY

## 2023-04-24 RX ORDER — DOXYCYCLINE 50 MG/1
50 TABLET ORAL DAILY
COMMUNITY

## 2023-04-24 RX ORDER — SODIUM CHLORIDE 0.9 % (FLUSH) 0.9 %
5-10 SYRINGE (ML) INJECTION AS NEEDED
Status: DISCONTINUED | OUTPATIENT
Start: 2023-04-24 | End: 2023-04-25 | Stop reason: HOSPADM

## 2023-04-24 RX ORDER — SODIUM CHLORIDE 9 MG/ML
25 INJECTION, SOLUTION INTRAVENOUS ONCE
Status: COMPLETED | OUTPATIENT
Start: 2023-04-24 | End: 2023-04-24

## 2023-04-24 RX ADMIN — SODIUM CHLORIDE 25 ML/HR: 9 INJECTION, SOLUTION INTRAVENOUS at 09:24

## 2023-04-24 RX ADMIN — SODIUM CHLORIDE, PRESERVATIVE FREE 10 ML: 5 INJECTION INTRAVENOUS at 09:28

## 2023-04-24 NOTE — PROGRESS NOTES
[address] [facility], MD, FACP, [place], [place]      TUNDE Quinteros, [facility]NP-BC   Yany Hayes, [facility]   Angelia Ramos, FNLAST-ADI Nunes, FNP-C    Dorinda Hsieh, [facility]NP-BC       [address]    at [address], [address] [facility], [address]    [phone]    FAX: [facility]    [address], [address] [facility], [address]    [phone]    FAX: [address] NOTE    Daron Coffey is a 58 y.o. 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 The patient was seen today for infusion #19 visit for the [facility] clinical trial for patients with WILD cirrhosis. The study is an infused therapy of Belapectin (GR-MD-02) versus placebo given every 2 weeks and evaluating is effects on fibrosis. Today's visit was conducted per the study protocol. The patient was asked if any symptoms, side effects or adverse events have occurred since the last study visit. A physical examination was performed per protocol, this was normal - no new findings. All symptoms reported by the patient and the findings of the physical examination were recorded in the clinical trial documents. Symptoms of clinical significance were:  Rash, this is chronic but has been worse lately, will go to PCP for steroid pack. PCP sent her to Dermatology for severe rash of face last week. Went to Dermatology, and diagnosed rash as rosacea, is on cream and antibiotics. Has not started antibiotic yet (doxycycline). Was told this is ok to start. Had biopsy on hip rash, still pending. Findings on physical examination of clinical significance were: Rash on face. Patient has underlying cirrhosis and is in need of [facility] [address] screening.  [facility] [address] screening is being performed at appropriate intervals as part of the clinical trial.  The next imaging will be due at week 24. AFP 15.4/AFPL3 7.0. AFP due in 5/2023. HISTOLOGY:  1/2022. FibroScan performed at [facility]. EkPa was 30.2. IQR/med 12%. . The results suggested a fibrosis level of F4. The CAP score suggests there is no significant hepatic steatosis. 8/02/2022. Liver Biopsy. Macrovesicular steatosis involving 5% of the parenchyma, ballooning present, lobular and portal inflammation and cirrhosis. ZACHARIAH 4.   8/2022. FibroScan performed at [facility]. EkPa was 61.0. IQR/med 6%. . The results suggested a fibrosis level of F4. The CAP score suggests there is hepatic steatosis. 2/2023. FibroScan performed at [facility]. EkPa was 23.1. IQR/med 13%. . The results suggested a fibrosis level of F4. The CAP score suggests there is no significant hepatic steatosis. RADIOLOGY:  7/2022. Ultrasound of liver. Echogenic consistent with cirrhosis. No dilated bile ducts. No ascites. Mild ascites. Solid hypoechoic mass in the left lobe measuring 1.1 x 0.8 x 0.9 cm. Adjacent to this is a 0.8 x 0.8 x 0.8 cm mass. 7/2022. MRI of liver. Cirrhotic appearing liver. No suspicious mass or lesion. 1.5 x 1.4 cm nonenhancing cyst in the upper central liver. Left lobe hyperintense lesions largest 4 mm likely a cyst. There is a 5 mm cyst in the right kidney and a 7 mm cyst in the left kidney. 1/2023. Ultrasound of liver. Stable cirrhosis. Stable hypoechoic lesion in the left liver in keeping with a nonaggressive finding on MRI. No new liver mass. No ascites. Follow-up per protocol. Documentation reviewed and updated to reflect current, accurate patient information.       Hosey Hashimoto, ZAKIAP-C  Liver [facility] 59, [address]  [phone]  [address] [facility]

## 2023-04-24 NOTE — PROGRESS NOTES
[facility] Peds/Adult Note                       Date: 2023    Name: Raudel Hendricks    MRN: [id number]         : [id number] Patient arrives for Clinical Trial Medication without acute problems. Please see Connect Care for complete assessment and education provided. Vital signs stable throughout and prior to discharge. Patient tolerated procedure well and was discharged without incident. Patient is aware of next [facility] appointment on 2023. Appointment card give to the Patient    Ms. Joseph vitals were reviewed prior to and after treatment. Patient Vitals for the past 12 hrs:   Temp Pulse Resp BP   23 1038 -- 71 18 136/76   23 0917 98 °F (36.7 °C) 86 18 133/73       Medications given:   Medications Administered       0.9% sodium chloride infusion       Admin Date  2023 Action  New Bag Dose  25 mL/hr Rate  25 mL/hr Route  IntraVENous Administered By  Savita Cordon RN              INV GR-MD-02 galactoarabino-rhamnogalacturonate/placebo in 0.9% sodium chloride 100 mL (+/- 10% base fluid volume) investigational infusion       Admin Date  2023 Action  New Bag Dose   Rate  100 mL/hr Route  IntraVENous Administered By  Savita Cordon RN              sodium chloride (NS) flush 5-10 mL       Admin Date  2023 Action  Given Dose  10 mL Route  IntraVENous Administered By  Savita Cordon RN                Ms. Stephanie Munoz tolerated the infusion, and had no complaints. Ms. Stephanie Munoz was discharged from [place] [address] in stable condition.      Future Appointments   Date Time Provider Peggy Khan   2023  8:20 AM LIV_[facility]_NURSE_RESEARCH LIVR BS AMB   2023  9:00 AM A3 PEDS FA[facility]ACK [facility]   2023  8:20 AM LIV_[facility]_NURSE_RESEARCH LIVR BS AMB   2023  9:00 AM B4 PEDS [facility]   2023  8:20 AM LIV_[facility]_NURSE_RESEARCH LIVR BS AMB   2023  9:00 AM B4 PEDS [facility]   2023  9:00 AM B4 PEDS [facility]   7/3/2023  9:00 AM B4 PEDS FASTRACK [facility]   7/17/2023  9:00 AM [facility]   7/31/2023  9:00 AM [facility]   8/14/2023  9:00 AM B[facility]   8/28/2023  9:00 AM [facility]   9/11/2023  9:00 AM [facility]   9/25/2023  9:00 AM [facility]   10/9/2023  9:00 AM B4 PEDS FASTRACK RCHPOPIC FÉLIX Love  April 24, 2023  11:27 AM

## 2023-05-04 RX ORDER — SODIUM CHLORIDE 9 MG/ML
5-250 INJECTION, SOLUTION INTRAVENOUS PRN
Status: CANCELLED | OUTPATIENT
Start: 2023-05-08

## 2023-05-08 ENCOUNTER — HOSPITAL ENCOUNTER (OUTPATIENT)
Facility: HOSPITAL | Age: 63
Setting detail: INFUSION SERIES
End: 2023-05-08
Payer: OTHER GOVERNMENT

## 2023-05-08 ENCOUNTER — APPOINTMENT (OUTPATIENT)
Dept: INFUSION THERAPY | Age: 63
End: 2023-05-08

## 2023-05-08 VITALS
TEMPERATURE: 97.6 F | HEART RATE: 79 BPM | RESPIRATION RATE: 16 BRPM | DIASTOLIC BLOOD PRESSURE: 74 MMHG | SYSTOLIC BLOOD PRESSURE: 126 MMHG

## 2023-05-08 DIAGNOSIS — K75.81 NASH (NONALCOHOLIC STEATOHEPATITIS): Primary | ICD-10-CM

## 2023-05-08 PROCEDURE — 2580000003 HC RX 258: Performed by: INTERNAL MEDICINE

## 2023-05-08 PROCEDURE — 96365 THER/PROPH/DIAG IV INF INIT: CPT

## 2023-05-08 PROCEDURE — 2500000003 HC RX 250 WO HCPCS: Performed by: INTERNAL MEDICINE

## 2023-05-08 RX ORDER — ONDANSETRON 2 MG/ML
8 INJECTION INTRAMUSCULAR; INTRAVENOUS
Status: DISCONTINUED | OUTPATIENT
Start: 2023-05-08 | End: 2023-05-09 | Stop reason: HOSPADM

## 2023-05-08 RX ORDER — ALBUTEROL SULFATE 90 UG/1
4 AEROSOL, METERED RESPIRATORY (INHALATION) PRN
Status: CANCELLED | OUTPATIENT
Start: 2023-05-22

## 2023-05-08 RX ORDER — SODIUM CHLORIDE 9 MG/ML
INJECTION, SOLUTION INTRAVENOUS CONTINUOUS
Status: CANCELLED | OUTPATIENT
Start: 2023-05-22

## 2023-05-08 RX ORDER — SODIUM CHLORIDE 9 MG/ML
5-250 INJECTION, SOLUTION INTRAVENOUS PRN
Status: CANCELLED | OUTPATIENT
Start: 2023-05-22

## 2023-05-08 RX ORDER — DIPHENHYDRAMINE HYDROCHLORIDE 50 MG/ML
50 INJECTION INTRAMUSCULAR; INTRAVENOUS
Status: CANCELLED | OUTPATIENT
Start: 2023-05-22

## 2023-05-08 RX ORDER — DIPHENHYDRAMINE HYDROCHLORIDE 50 MG/ML
50 INJECTION INTRAMUSCULAR; INTRAVENOUS
Status: DISCONTINUED | OUTPATIENT
Start: 2023-05-08 | End: 2023-05-09 | Stop reason: HOSPADM

## 2023-05-08 RX ORDER — ACETAMINOPHEN 325 MG/1
650 TABLET ORAL
Status: DISCONTINUED | OUTPATIENT
Start: 2023-05-08 | End: 2023-05-09 | Stop reason: HOSPADM

## 2023-05-08 RX ORDER — ONDANSETRON 2 MG/ML
8 INJECTION INTRAMUSCULAR; INTRAVENOUS
Status: CANCELLED | OUTPATIENT
Start: 2023-05-22

## 2023-05-08 RX ORDER — SODIUM CHLORIDE 0.9 % (FLUSH) 0.9 %
5-40 SYRINGE (ML) INJECTION PRN
Status: CANCELLED | OUTPATIENT
Start: 2023-05-22

## 2023-05-08 RX ORDER — SODIUM CHLORIDE 9 MG/ML
5-250 INJECTION, SOLUTION INTRAVENOUS PRN
Status: DISCONTINUED | OUTPATIENT
Start: 2023-05-08 | End: 2023-05-09 | Stop reason: HOSPADM

## 2023-05-08 RX ORDER — EPINEPHRINE 1 MG/ML
0.3 INJECTION, SOLUTION, CONCENTRATE INTRAVENOUS PRN
Status: CANCELLED | OUTPATIENT
Start: 2023-05-22

## 2023-05-08 RX ORDER — SODIUM CHLORIDE 0.9 % (FLUSH) 0.9 %
5-40 SYRINGE (ML) INJECTION PRN
Status: DISCONTINUED | OUTPATIENT
Start: 2023-05-08 | End: 2023-05-09 | Stop reason: HOSPADM

## 2023-05-08 RX ORDER — ACETAMINOPHEN 325 MG/1
650 TABLET ORAL
Status: CANCELLED | OUTPATIENT
Start: 2023-05-22

## 2023-05-08 RX ORDER — SODIUM CHLORIDE 9 MG/ML
INJECTION, SOLUTION INTRAVENOUS CONTINUOUS
Status: DISCONTINUED | OUTPATIENT
Start: 2023-05-08 | End: 2023-08-14

## 2023-05-08 RX ORDER — ALBUTEROL SULFATE 90 UG/1
4 AEROSOL, METERED RESPIRATORY (INHALATION) PRN
Status: DISCONTINUED | OUTPATIENT
Start: 2023-05-08 | End: 2023-08-14

## 2023-05-08 RX ORDER — EPINEPHRINE 1 MG/ML
0.3 INJECTION, SOLUTION, CONCENTRATE INTRAVENOUS PRN
Status: DISCONTINUED | OUTPATIENT
Start: 2023-05-08 | End: 2023-08-14

## 2023-05-08 RX ADMIN — SODIUM CHLORIDE: 9 INJECTION, SOLUTION INTRAVENOUS at 09:48

## 2023-05-08 RX ADMIN — Medication: at 10:28

## 2023-05-08 NOTE — PROGRESS NOTES
[facility] Peds/Adult Note                       Date: May 8, 2023    Name: Martir Enrique    MRN: [id number]         : 1960    0930 Patient arrives for Research Study Infusion without acute problems. Please see [facility] for complete assessment and education provided. Vital signs stable throughout and prior to discharge. Patient tolerated procedure well and was discharged without incident. Patient is aware of next [facility] appointment on 23. Appointment card give to the patient. Ms. Anna Edmonds vitals were reviewed prior to and after treatment. Patient Vitals for the past 12 hrs:   Temp Pulse Resp BP   23 1200 -- 79 16 126/74   23 1130 -- 77 16 121/78   23 0930 97.6 °F (36.4 °C) 86 16 136/69         Lab results were obtained and reviewed. No results found for this or any previous visit (from the past 12 hour(s)). Medications given:   IV Galectin/Placebo Infusion over 1 hour  NS flush & Alanda Warren    Ms. Stephenson tolerated the infusion, and had no complaints. Ms. Lorena Carr was discharged from [place] [address] in stable condition. Discharge Instructions provided to patient, patient verbalized understanding but denied the request for a copy of d/c instructions.      Future Appointments   Date Time Provider Carleen Madrid   2023  8:20 AM LIV_[facility]_NURSE_RESEARCH LIVR [facility]   2023  9:00 AM B4 PEDS FASTRACK [facility]   2023  8:20 AM LIV_[facility]_NURSE_RESEARCH LIVR [facility]   2023  9:00 AM B4 PEDS FASTRACK [facility]   2023  9:00 AM B4 PEDS FASTRACK [facility]   7/3/2023  9:00 AM B4 PEDS FASTRACK RCHPOPIC [address] [place] [address]   2023  9:00 AM B4 PEDS FASTRACK [facility] [address] [place] [address]   2023  9:00 AM A3 PEDS FASTRACK RCHPOPIC [address] [place] [address]   2023  9:00 AM B4 PEDS FASTRACK [facility] [address] [place] [address]   2023  9:00 AM B4 PEDS FASTRACK [facility]OPIC [address] [facility]   2023  9:00 AM B4 PEDS FASTRACK [facility] [address] [place] [address]   2023  9:00 AM B4 PEDS FASTRACK [facility] [address] [place] [address]   10/9/2023  9:00 AM B4 PEDS FASTRACK

## 2023-05-22 ENCOUNTER — NURSE ONLY (OUTPATIENT)
Age: 63
End: 2023-05-22

## 2023-05-22 ENCOUNTER — HOSPITAL ENCOUNTER (OUTPATIENT)
Facility: HOSPITAL | Age: 63
Setting detail: INFUSION SERIES
Discharge: HOME | End: 2023-05-22
Payer: OTHER GOVERNMENT

## 2023-05-22 ENCOUNTER — APPOINTMENT (OUTPATIENT)
Dept: INFUSION THERAPY | Age: 63
End: 2023-05-22

## 2023-05-22 VITALS
RESPIRATION RATE: 16 BRPM | TEMPERATURE: 97.8 F | SYSTOLIC BLOOD PRESSURE: 134 MMHG | DIASTOLIC BLOOD PRESSURE: 79 MMHG | HEART RATE: 74 BPM

## 2023-05-22 DIAGNOSIS — Z00.6 RESEARCH EXAM: Primary | ICD-10-CM

## 2023-05-22 DIAGNOSIS — K75.81 NASH (NONALCOHOLIC STEATOHEPATITIS): Primary | ICD-10-CM

## 2023-05-22 PROCEDURE — 96365 THER/PROPH/DIAG IV INF INIT: CPT

## 2023-05-22 PROCEDURE — 2580000003 HC RX 258: Performed by: INTERNAL MEDICINE

## 2023-05-22 PROCEDURE — 2500000003 HC RX 250 WO HCPCS: Performed by: INTERNAL MEDICINE

## 2023-05-22 RX ORDER — ACETAMINOPHEN 325 MG/1
650 TABLET ORAL
Status: DISCONTINUED | OUTPATIENT
Start: 2023-05-22 | End: 2023-05-23 | Stop reason: HOSPADM

## 2023-05-22 RX ORDER — ALBUTEROL SULFATE 90 UG/1
4 AEROSOL, METERED RESPIRATORY (INHALATION) PRN
Status: DISCONTINUED | OUTPATIENT
Start: 2023-05-22 | End: 2023-08-14

## 2023-05-22 RX ORDER — SODIUM CHLORIDE 9 MG/ML
INJECTION, SOLUTION INTRAVENOUS CONTINUOUS
Status: CANCELLED | OUTPATIENT
Start: 2023-06-05

## 2023-05-22 RX ORDER — DIPHENHYDRAMINE HYDROCHLORIDE 50 MG/ML
50 INJECTION INTRAMUSCULAR; INTRAVENOUS
Status: CANCELLED | OUTPATIENT
Start: 2023-06-05

## 2023-05-22 RX ORDER — SODIUM CHLORIDE 0.9 % (FLUSH) 0.9 %
5-40 SYRINGE (ML) INJECTION PRN
Status: DISCONTINUED | OUTPATIENT
Start: 2023-05-22 | End: 2023-05-23 | Stop reason: HOSPADM

## 2023-05-22 RX ORDER — SODIUM CHLORIDE 9 MG/ML
5-250 INJECTION, SOLUTION INTRAVENOUS PRN
Status: DISCONTINUED | OUTPATIENT
Start: 2023-05-22 | End: 2023-05-23 | Stop reason: HOSPADM

## 2023-05-22 RX ORDER — ACETAMINOPHEN 325 MG/1
650 TABLET ORAL
Status: CANCELLED | OUTPATIENT
Start: 2023-06-05

## 2023-05-22 RX ORDER — ALBUTEROL SULFATE 90 UG/1
4 AEROSOL, METERED RESPIRATORY (INHALATION) PRN
Status: CANCELLED | OUTPATIENT
Start: 2023-06-05

## 2023-05-22 RX ORDER — EPINEPHRINE 1 MG/ML
0.3 INJECTION, SOLUTION, CONCENTRATE INTRAVENOUS PRN
Status: CANCELLED | OUTPATIENT
Start: 2023-06-05

## 2023-05-22 RX ORDER — DIPHENHYDRAMINE HYDROCHLORIDE 50 MG/ML
50 INJECTION INTRAMUSCULAR; INTRAVENOUS
Status: DISCONTINUED | OUTPATIENT
Start: 2023-05-22 | End: 2023-05-23 | Stop reason: HOSPADM

## 2023-05-22 RX ORDER — SODIUM CHLORIDE 9 MG/ML
5-250 INJECTION, SOLUTION INTRAVENOUS PRN
Status: CANCELLED | OUTPATIENT
Start: 2023-06-05

## 2023-05-22 RX ORDER — SODIUM CHLORIDE 9 MG/ML
INJECTION, SOLUTION INTRAVENOUS CONTINUOUS
Status: DISCONTINUED | OUTPATIENT
Start: 2023-05-22 | End: 2023-08-14

## 2023-05-22 RX ORDER — ONDANSETRON 2 MG/ML
8 INJECTION INTRAMUSCULAR; INTRAVENOUS
Status: CANCELLED | OUTPATIENT
Start: 2023-06-05

## 2023-05-22 RX ORDER — EPINEPHRINE 1 MG/ML
0.3 INJECTION, SOLUTION, CONCENTRATE INTRAVENOUS PRN
Status: DISCONTINUED | OUTPATIENT
Start: 2023-05-22 | End: 2023-08-14

## 2023-05-22 RX ORDER — SODIUM CHLORIDE 0.9 % (FLUSH) 0.9 %
5-40 SYRINGE (ML) INJECTION PRN
Status: CANCELLED | OUTPATIENT
Start: 2023-06-05

## 2023-05-22 RX ORDER — ONDANSETRON 2 MG/ML
8 INJECTION INTRAMUSCULAR; INTRAVENOUS
Status: DISCONTINUED | OUTPATIENT
Start: 2023-05-22 | End: 2023-05-23 | Stop reason: HOSPADM

## 2023-05-22 RX ADMIN — SODIUM CHLORIDE: 9 INJECTION, SOLUTION INTRAVENOUS at 09:30

## 2023-05-22 RX ADMIN — Medication: at 10:10

## 2023-06-05 ENCOUNTER — NURSE ONLY (OUTPATIENT)
Age: 63
End: 2023-06-05

## 2023-06-05 ENCOUNTER — HOSPITAL ENCOUNTER (OUTPATIENT)
Facility: HOSPITAL | Age: 63
Setting detail: INFUSION SERIES
End: 2023-06-05
Payer: OTHER GOVERNMENT

## 2023-06-05 VITALS
HEART RATE: 69 BPM | SYSTOLIC BLOOD PRESSURE: 148 MMHG | RESPIRATION RATE: 18 BRPM | DIASTOLIC BLOOD PRESSURE: 83 MMHG | TEMPERATURE: 97.6 F

## 2023-06-05 DIAGNOSIS — Z00.6 RESEARCH EXAM: Primary | ICD-10-CM

## 2023-06-05 DIAGNOSIS — K75.81 NASH (NONALCOHOLIC STEATOHEPATITIS): Primary | ICD-10-CM

## 2023-06-05 PROCEDURE — 2500000003 HC RX 250 WO HCPCS: Performed by: INTERNAL MEDICINE

## 2023-06-05 PROCEDURE — 2580000003 HC RX 258: Performed by: INTERNAL MEDICINE

## 2023-06-05 PROCEDURE — 96365 THER/PROPH/DIAG IV INF INIT: CPT

## 2023-06-05 RX ORDER — SODIUM CHLORIDE 0.9 % (FLUSH) 0.9 %
5-40 SYRINGE (ML) INJECTION PRN
Status: DISCONTINUED | OUTPATIENT
Start: 2023-06-05 | End: 2023-06-06 | Stop reason: HOSPADM

## 2023-06-05 RX ORDER — ACETAMINOPHEN 325 MG/1
650 TABLET ORAL
Status: CANCELLED | OUTPATIENT
Start: 2023-06-19

## 2023-06-05 RX ORDER — ALBUTEROL SULFATE 90 UG/1
4 AEROSOL, METERED RESPIRATORY (INHALATION) PRN
Status: CANCELLED | OUTPATIENT
Start: 2023-06-19

## 2023-06-05 RX ORDER — SODIUM CHLORIDE 9 MG/ML
5-250 INJECTION, SOLUTION INTRAVENOUS PRN
Status: DISCONTINUED | OUTPATIENT
Start: 2023-06-05 | End: 2023-06-06 | Stop reason: HOSPADM

## 2023-06-05 RX ORDER — EPINEPHRINE 1 MG/ML
0.3 INJECTION, SOLUTION, CONCENTRATE INTRAVENOUS PRN
Status: ACTIVE | OUTPATIENT
Start: 2023-06-05 | End: 2023-06-05

## 2023-06-05 RX ORDER — ONDANSETRON 2 MG/ML
8 INJECTION INTRAMUSCULAR; INTRAVENOUS
Status: CANCELLED | OUTPATIENT
Start: 2023-06-19

## 2023-06-05 RX ORDER — SODIUM CHLORIDE 9 MG/ML
INJECTION, SOLUTION INTRAVENOUS PRN
Status: DISCONTINUED | OUTPATIENT
Start: 2023-06-05 | End: 2023-06-06 | Stop reason: HOSPADM

## 2023-06-05 RX ORDER — ONDANSETRON 2 MG/ML
8 INJECTION INTRAMUSCULAR; INTRAVENOUS
Status: DISCONTINUED | OUTPATIENT
Start: 2023-06-05 | End: 2023-06-06 | Stop reason: HOSPADM

## 2023-06-05 RX ORDER — DIPHENHYDRAMINE HYDROCHLORIDE 50 MG/ML
50 INJECTION INTRAMUSCULAR; INTRAVENOUS
Status: CANCELLED | OUTPATIENT
Start: 2023-06-19

## 2023-06-05 RX ORDER — ALBUTEROL SULFATE 90 UG/1
4 AEROSOL, METERED RESPIRATORY (INHALATION) PRN
Status: DISCONTINUED | OUTPATIENT
Start: 2023-06-05 | End: 2023-06-06 | Stop reason: HOSPADM

## 2023-06-05 RX ORDER — DIPHENHYDRAMINE HYDROCHLORIDE 50 MG/ML
50 INJECTION INTRAMUSCULAR; INTRAVENOUS
Status: DISCONTINUED | OUTPATIENT
Start: 2023-06-05 | End: 2023-06-06 | Stop reason: HOSPADM

## 2023-06-05 RX ORDER — ACETAMINOPHEN 325 MG/1
650 TABLET ORAL
Status: DISCONTINUED | OUTPATIENT
Start: 2023-06-05 | End: 2023-06-06 | Stop reason: HOSPADM

## 2023-06-05 RX ORDER — SODIUM CHLORIDE 0.9 % (FLUSH) 0.9 %
5-40 SYRINGE (ML) INJECTION PRN
Status: CANCELLED | OUTPATIENT
Start: 2023-06-19

## 2023-06-05 RX ORDER — SODIUM CHLORIDE 9 MG/ML
INJECTION, SOLUTION INTRAVENOUS CONTINUOUS
Status: CANCELLED | OUTPATIENT
Start: 2023-06-19

## 2023-06-05 RX ORDER — EPINEPHRINE 1 MG/ML
0.3 INJECTION, SOLUTION, CONCENTRATE INTRAVENOUS PRN
Status: CANCELLED | OUTPATIENT
Start: 2023-06-19

## 2023-06-05 RX ORDER — SODIUM CHLORIDE 9 MG/ML
5-250 INJECTION, SOLUTION INTRAVENOUS PRN
Status: CANCELLED | OUTPATIENT
Start: 2023-06-19

## 2023-06-05 RX ADMIN — Medication: at 09:52

## 2023-06-05 RX ADMIN — SODIUM CHLORIDE 25 ML/HR: 9 INJECTION, SOLUTION INTRAVENOUS at 09:40

## 2023-06-05 NOTE — PROGRESS NOTES
[facility] Peds/Adult Note                       Date: 2023    Name: Catracho Calhoun    MRN: [id number]         : 1960    0900 Patient arrives for Research Infusion without acute problems. Please see [facility] for complete assessment and education provided. Vital signs stable throughout and prior to discharge. Patient tolerated procedure well and was discharged without incident. Patient is aware of next [facility] appointment on 23. Appointment card give to the Patient. Ms. Josselyn Dawn vitals were reviewed prior to and after treatment. Patient Vitals for the past 12 hrs:   Temp Pulse Resp BP   23 1056 -- 69 18 (!) 148/83   23 0900 97.6 °F (36.4 °C) 71 18 127/86         Lab results were obtained and reviewed. No results found for this or any previous visit (from the past 12 hour(s)). Medications given:   Medications Administered         (Belapectin/Placebo) investigational 1 each in sodium chloride 0.9 % 100 mL IVPB Admin Date  2023 Action  Given Dose   Rate  100 mL/hr Route  IntraVENous Administered By  Zhao Harrison RN        0.9 % sodium chloride infusion Admin Date  2023 Action  New Bag Dose  25 mL/hr Rate  25 mL/hr Route  IntraVENous Administered By  Brennen Broderick RN              Ms. Yaneli Garcia tolerated the infusion, and had no complaints. Ms. Yaneli Garcia was discharged from [place] [address] in stable condition. Patient verbalized understanding but denied the request for a copy of d/c instructions.      Future Appointments   Date Time Provider Carleen Madrid   2023  8:20 AM LIV_[facility]_NURSE_RESEARCH LIVR BS AMB   2023  9:00 AM B4 PEDS FASTRACK [facility]   7/3/2023  8:40 AM LIV_[facility]_NURSE_RESEARCH LIVR BS AMB   7/3/2023  9:00 AM B4 PEDS FASTRACK [facility]   2023  8:00 AM Elroy Duane, APRN - NP LIVR BS AMB   2023  9:00 AM B4 PEDS FASTRACK [facility]   2023 11:30 AM [facility] [place]   2023  8:20 AM

## 2023-06-19 ENCOUNTER — HOSPITAL ENCOUNTER (OUTPATIENT)
Facility: HOSPITAL | Age: 63
Setting detail: INFUSION SERIES
End: 2023-06-19
Payer: OTHER GOVERNMENT

## 2023-06-19 ENCOUNTER — NURSE ONLY (OUTPATIENT)
Age: 63
End: 2023-06-19

## 2023-06-19 ENCOUNTER — APPOINTMENT (OUTPATIENT)
Dept: INFUSION THERAPY | Age: 63
End: 2023-06-19

## 2023-06-19 VITALS
DIASTOLIC BLOOD PRESSURE: 88 MMHG | RESPIRATION RATE: 18 BRPM | HEART RATE: 74 BPM | TEMPERATURE: 97.7 F | SYSTOLIC BLOOD PRESSURE: 126 MMHG

## 2023-06-19 DIAGNOSIS — K75.81 NASH (NONALCOHOLIC STEATOHEPATITIS): Primary | ICD-10-CM

## 2023-06-19 DIAGNOSIS — Z00.6 RESEARCH EXAM: Primary | ICD-10-CM

## 2023-06-19 PROCEDURE — 2580000003 HC RX 258: Performed by: INTERNAL MEDICINE

## 2023-06-19 PROCEDURE — 96365 THER/PROPH/DIAG IV INF INIT: CPT

## 2023-06-19 PROCEDURE — 2500000003 HC RX 250 WO HCPCS: Performed by: INTERNAL MEDICINE

## 2023-06-19 RX ORDER — VITAMIN B COMPLEX
TABLET ORAL DAILY
COMMUNITY

## 2023-06-19 RX ORDER — ONDANSETRON 2 MG/ML
8 INJECTION INTRAMUSCULAR; INTRAVENOUS
Status: CANCELLED | OUTPATIENT
Start: 2023-07-03

## 2023-06-19 RX ORDER — TRAMADOL HYDROCHLORIDE 50 MG/1
TABLET ORAL
COMMUNITY

## 2023-06-19 RX ORDER — DOXYCYCLINE HYCLATE 50 MG/1
TABLET, FILM COATED ORAL
COMMUNITY
Start: 2023-06-15

## 2023-06-19 RX ORDER — DIPHENHYDRAMINE HYDROCHLORIDE 50 MG/ML
50 INJECTION INTRAMUSCULAR; INTRAVENOUS
Status: CANCELLED | OUTPATIENT
Start: 2023-07-03

## 2023-06-19 RX ORDER — EPINEPHRINE 1 MG/ML
0.3 INJECTION, SOLUTION, CONCENTRATE INTRAVENOUS PRN
Status: CANCELLED | OUTPATIENT
Start: 2023-07-03

## 2023-06-19 RX ORDER — NITROFURANTOIN 25; 75 MG/1; MG/1
CAPSULE ORAL
COMMUNITY
Start: 2022-06-14

## 2023-06-19 RX ORDER — LEVOCETIRIZINE DIHYDROCHLORIDE 5 MG/1
5 TABLET, FILM COATED ORAL
COMMUNITY

## 2023-06-19 RX ORDER — ONDANSETRON 4 MG/1
TABLET, FILM COATED ORAL
COMMUNITY

## 2023-06-19 RX ORDER — SODIUM CHLORIDE 9 MG/ML
5-250 INJECTION, SOLUTION INTRAVENOUS PRN
Status: CANCELLED | OUTPATIENT
Start: 2023-07-03

## 2023-06-19 RX ORDER — ACETAMINOPHEN 325 MG/1
650 TABLET ORAL
Status: CANCELLED | OUTPATIENT
Start: 2023-07-03

## 2023-06-19 RX ORDER — MELOXICAM 15 MG/1
TABLET ORAL
COMMUNITY
Start: 2023-05-23

## 2023-06-19 RX ORDER — SODIUM CHLORIDE 0.9 % (FLUSH) 0.9 %
5-40 SYRINGE (ML) INJECTION PRN
Status: CANCELLED | OUTPATIENT
Start: 2023-07-03

## 2023-06-19 RX ORDER — SODIUM CHLORIDE 9 MG/ML
5-250 INJECTION, SOLUTION INTRAVENOUS PRN
Status: DISCONTINUED | OUTPATIENT
Start: 2023-06-19 | End: 2023-06-20 | Stop reason: HOSPADM

## 2023-06-19 RX ORDER — SODIUM CHLORIDE 9 MG/ML
INJECTION, SOLUTION INTRAVENOUS CONTINUOUS
Status: CANCELLED | OUTPATIENT
Start: 2023-07-03

## 2023-06-19 RX ORDER — ALBUTEROL SULFATE 90 UG/1
4 AEROSOL, METERED RESPIRATORY (INHALATION) PRN
Status: CANCELLED | OUTPATIENT
Start: 2023-07-03

## 2023-06-19 RX ADMIN — Medication: at 09:28

## 2023-06-19 RX ADMIN — SODIUM CHLORIDE 25 ML/HR: 9 INJECTION, SOLUTION INTRAVENOUS at 09:09

## 2023-06-19 NOTE — PROGRESS NOTES
[facility] Peds/Adult Note                   Date: 2023    Name: Georgia Stuart    MRN: [id number]       : 1960    0900 Patient arrives for Research Infusion without acute problems. Please see [facility] for complete assessment and education provided. Vital signs stable throughout and prior to discharge. Patient tolerated procedure well and was discharged without incident. Patient is aware of next [facility] appointment on 2023. Appointment card give to the patient. Ms. Sulaiman Mckinney vitals were reviewed prior to and after treatment. Patient Vitals for the past 12 hrs:   Temp Pulse Resp BP   23 1045 -- 74 18 126/88   23 1042 -- 72 18 (!) 159/70   23 0900 97.7 °F (36.5 °C) 71 18 133/75       Medications given:   Medications Administered         (Belapectin/Placebo) investigational 1 each in sodium chloride 0.9 % 100 mL IVPB Admin Date  2023 Action  Given Dose   Rate  100 mL/hr Route  IntraVENous Administered By  Neftali Baca RN        0.9 % sodium chloride infusion Admin Date  2023 Action  New Bag Dose  25 mL/hr Rate  25 mL/hr Route  IntraVENous Administered By  Neftali Baca RN              Ms. Anna Razo tolerated the infusion, and had no complaints. Ms. Anna Razo was discharged from [place] [address] in stable condition.      Future Appointments   Date Time Provider Carleen Madrid   7/3/2023  8:40 AM LIV_[facility]_NURSE_RESEARCH LIVR BS [facility]   7/3/2023  9:00 AM B4 PEDS FASTRACK [facility] [address] [place] [address]   2023  8:00 AM NESS Montez NP LIVR BS [facility]   2023  9:00 AM B4 PEDS FASTRACK [facility] [address] [place] [address]   2023 11:30 AM [address] [place] [address] US OP 1 SMHRUS [address] [place] [address]   2023  8:20 AM LIV_[facility]_NURSE_RESEARCH LIVR BS [facility]   2023  9:00 AM A3 PEDS FASTRACK RCHPOPIC [address] [place] [address]   2023  8:30 AM NESS Pitts NP AMB   2023  8:45 AM NESS Pitts NP AMB   2023  9:00 AM SHARAN ORO [address]   2023  8:20 AM

## 2023-07-03 ENCOUNTER — NURSE ONLY (OUTPATIENT)
Age: 63
End: 2023-07-03

## 2023-07-03 ENCOUNTER — APPOINTMENT (OUTPATIENT)
Dept: INFUSION THERAPY | Age: 63
End: 2023-07-03

## 2023-07-03 ENCOUNTER — HOSPITAL ENCOUNTER (OUTPATIENT)
Facility: HOSPITAL | Age: 63
Setting detail: INFUSION SERIES
End: 2023-07-03
Payer: OTHER GOVERNMENT

## 2023-07-03 VITALS
DIASTOLIC BLOOD PRESSURE: 73 MMHG | HEART RATE: 61 BPM | RESPIRATION RATE: 16 BRPM | TEMPERATURE: 97.5 F | SYSTOLIC BLOOD PRESSURE: 141 MMHG

## 2023-07-03 DIAGNOSIS — Z00.6 RESEARCH EXAM: Primary | ICD-10-CM

## 2023-07-03 DIAGNOSIS — K75.81 NASH (NONALCOHOLIC STEATOHEPATITIS): Primary | ICD-10-CM

## 2023-07-03 PROCEDURE — 2500000003 HC RX 250 WO HCPCS: Performed by: INTERNAL MEDICINE

## 2023-07-03 PROCEDURE — 2580000003 HC RX 258: Performed by: INTERNAL MEDICINE

## 2023-07-03 PROCEDURE — 96365 THER/PROPH/DIAG IV INF INIT: CPT

## 2023-07-03 RX ORDER — SODIUM CHLORIDE 9 MG/ML
INJECTION, SOLUTION INTRAVENOUS CONTINUOUS
Status: CANCELLED | OUTPATIENT
Start: 2023-07-17

## 2023-07-03 RX ORDER — SODIUM CHLORIDE 0.9 % (FLUSH) 0.9 %
5-40 SYRINGE (ML) INJECTION PRN
Status: CANCELLED | OUTPATIENT
Start: 2023-07-17

## 2023-07-03 RX ORDER — SODIUM CHLORIDE 9 MG/ML
5-250 INJECTION, SOLUTION INTRAVENOUS PRN
Status: DISCONTINUED | OUTPATIENT
Start: 2023-07-03 | End: 2023-07-04 | Stop reason: HOSPADM

## 2023-07-03 RX ORDER — SODIUM CHLORIDE 9 MG/ML
5-250 INJECTION, SOLUTION INTRAVENOUS PRN
Status: CANCELLED | OUTPATIENT
Start: 2023-07-17

## 2023-07-03 RX ORDER — ACETAMINOPHEN 325 MG/1
650 TABLET ORAL
Status: CANCELLED | OUTPATIENT
Start: 2023-07-17

## 2023-07-03 RX ORDER — SODIUM CHLORIDE 0.9 % (FLUSH) 0.9 %
5-40 SYRINGE (ML) INJECTION PRN
Status: DISCONTINUED | OUTPATIENT
Start: 2023-07-03 | End: 2023-07-04 | Stop reason: HOSPADM

## 2023-07-03 RX ORDER — ONDANSETRON 2 MG/ML
8 INJECTION INTRAMUSCULAR; INTRAVENOUS
Status: CANCELLED | OUTPATIENT
Start: 2023-07-17

## 2023-07-03 RX ORDER — ALBUTEROL SULFATE 90 UG/1
4 AEROSOL, METERED RESPIRATORY (INHALATION) PRN
Status: CANCELLED | OUTPATIENT
Start: 2023-07-17

## 2023-07-03 RX ORDER — EPINEPHRINE 1 MG/ML
0.3 INJECTION, SOLUTION, CONCENTRATE INTRAVENOUS PRN
Status: CANCELLED | OUTPATIENT
Start: 2023-07-17

## 2023-07-03 RX ORDER — DIPHENHYDRAMINE HYDROCHLORIDE 50 MG/ML
50 INJECTION INTRAMUSCULAR; INTRAVENOUS
Status: CANCELLED | OUTPATIENT
Start: 2023-07-17

## 2023-07-03 RX ADMIN — SODIUM CHLORIDE 25 ML/HR: 9 INJECTION, SOLUTION INTRAVENOUS at 09:35

## 2023-07-03 RX ADMIN — Medication: at 09:53

## 2023-07-17 ENCOUNTER — APPOINTMENT (OUTPATIENT)
Dept: INFUSION THERAPY | Age: 63
End: 2023-07-17

## 2023-07-17 ENCOUNTER — HOSPITAL ENCOUNTER (OUTPATIENT)
Facility: HOSPITAL | Age: 63
Discharge: HOME | End: 2023-07-20

## 2023-07-17 ENCOUNTER — OFFICE VISIT (OUTPATIENT)
Age: 63
End: 2023-07-17
Payer: OTHER GOVERNMENT

## 2023-07-17 ENCOUNTER — HOSPITAL ENCOUNTER (OUTPATIENT)
Facility: HOSPITAL | Age: 63
Setting detail: INFUSION SERIES
End: 2023-07-17
Payer: OTHER GOVERNMENT

## 2023-07-17 VITALS
HEART RATE: 75 BPM | RESPIRATION RATE: 18 BRPM | SYSTOLIC BLOOD PRESSURE: 135 MMHG | DIASTOLIC BLOOD PRESSURE: 78 MMHG | TEMPERATURE: 97.8 F

## 2023-07-17 DIAGNOSIS — K75.81 NASH (NONALCOHOLIC STEATOHEPATITIS): Primary | ICD-10-CM

## 2023-07-17 DIAGNOSIS — Z00.6 EXAMINATION OF PARTICIPANT IN CLINICAL TRIAL: ICD-10-CM

## 2023-07-17 DIAGNOSIS — K74.69 OTHER CIRRHOSIS OF LIVER (HCC): ICD-10-CM

## 2023-07-17 DIAGNOSIS — K75.81 NASH (NONALCOHOLIC STEATOHEPATITIS): ICD-10-CM

## 2023-07-17 DIAGNOSIS — Z00.6 RESEARCH EXAM: ICD-10-CM

## 2023-07-17 LAB
EKG ATRIAL RATE: 84 BPM
EKG DIAGNOSIS: NORMAL
EKG P AXIS: 3 DEGREES
EKG P-R INTERVAL: 158 MS
EKG Q-T INTERVAL: 392 MS
EKG QRS DURATION: 74 MS
EKG QTC CALCULATION (BAZETT): 463 MS
EKG R AXIS: -6 DEGREES
EKG T AXIS: 8 DEGREES
EKG VENTRICULAR RATE: 84 BPM

## 2023-07-17 PROCEDURE — 99213 OFFICE O/P EST LOW 20 MIN: CPT | Performed by: NURSE PRACTITIONER

## 2023-07-17 PROCEDURE — 2500000003 HC RX 250 WO HCPCS: Performed by: INTERNAL MEDICINE

## 2023-07-17 PROCEDURE — 96365 THER/PROPH/DIAG IV INF INIT: CPT

## 2023-07-17 PROCEDURE — 93010 ELECTROCARDIOGRAM REPORT: CPT | Performed by: SPECIALIST

## 2023-07-17 PROCEDURE — 2580000003 HC RX 258: Performed by: INTERNAL MEDICINE

## 2023-07-17 PROCEDURE — 76700 US EXAM ABDOM COMPLETE: CPT

## 2023-07-17 PROCEDURE — 93005 ELECTROCARDIOGRAM TRACING: CPT | Performed by: INTERNAL MEDICINE

## 2023-07-17 RX ORDER — ALBUTEROL SULFATE 90 UG/1
4 AEROSOL, METERED RESPIRATORY (INHALATION) PRN
Status: CANCELLED | OUTPATIENT
Start: 2023-07-31

## 2023-07-17 RX ORDER — SODIUM CHLORIDE 9 MG/ML
5-250 INJECTION, SOLUTION INTRAVENOUS PRN
Status: DISCONTINUED | OUTPATIENT
Start: 2023-07-17 | End: 2023-07-18 | Stop reason: HOSPADM

## 2023-07-17 RX ORDER — EPINEPHRINE 1 MG/ML
0.3 INJECTION, SOLUTION, CONCENTRATE INTRAVENOUS PRN
Status: CANCELLED | OUTPATIENT
Start: 2023-07-31

## 2023-07-17 RX ORDER — SODIUM CHLORIDE 9 MG/ML
INJECTION, SOLUTION INTRAVENOUS CONTINUOUS
Status: CANCELLED | OUTPATIENT
Start: 2023-07-31

## 2023-07-17 RX ORDER — ACETAMINOPHEN 325 MG/1
650 TABLET ORAL
Status: CANCELLED | OUTPATIENT
Start: 2023-07-31

## 2023-07-17 RX ORDER — DIPHENHYDRAMINE HYDROCHLORIDE 50 MG/ML
50 INJECTION INTRAMUSCULAR; INTRAVENOUS
Status: CANCELLED | OUTPATIENT
Start: 2023-07-31

## 2023-07-17 RX ORDER — ONDANSETRON 2 MG/ML
8 INJECTION INTRAMUSCULAR; INTRAVENOUS
Status: CANCELLED | OUTPATIENT
Start: 2023-07-31

## 2023-07-17 RX ORDER — SODIUM CHLORIDE 9 MG/ML
5-250 INJECTION, SOLUTION INTRAVENOUS PRN
Status: CANCELLED | OUTPATIENT
Start: 2023-07-31

## 2023-07-17 RX ORDER — SODIUM CHLORIDE 0.9 % (FLUSH) 0.9 %
5-40 SYRINGE (ML) INJECTION PRN
Status: CANCELLED | OUTPATIENT
Start: 2023-07-31

## 2023-07-17 RX ADMIN — SODIUM CHLORIDE 25 ML/HR: 9 INJECTION, SOLUTION INTRAVENOUS at 10:04

## 2023-07-17 RX ADMIN — Medication: at 10:28

## 2023-07-17 NOTE — PROGRESS NOTES
MD Bebo, FACP, [place], [place]      CLEO Chatterjee, [facility]   Hernandez Snow, [facility]   Libertad Cook FNOMEGA-C   Claudia Jefferson, FNP-C    Pearl [place], [facility]       [address] [facility]    at [facility]    [address] [facility], [address] [facility]    [place], [address]    [phone]    FAX: [phone] [address], 230 [facility], [address]    [phone]    FAX: 709 [facility] NOTE    Melissa Aquino is a 61 y.o. female with ZAFAR cirrhosis. The patient was seen today for infusion #25 visit for the [facility] clinical trial for patients with ZAFAR cirrhosis. The study is an infused therapy of Belapectin (GR-MD-02) versus placebo given every 2 weeks and evaluating is effects on fibrosis. Today's visit was conducted per the study protocol. The patient was asked if any symptoms, side effects or adverse events have occurred since the last study visit. A physical examination was performed per protocol. All symptoms reported by the patient and the findings of the physical examination were recorded in the clinical trial documents. Symptoms of clinical significance were:  None. Rash reported at last visit has responded to doxycycline hcylate and metronidazole cream.      Findings on physical examination of clinical significance were: None. Patient has underlying cirrhosis and is in need of [address] screening. [address] screening is being performed at appropriate intervals as part of the clinical trial. The most recent imaging was US performed in 1/2023. This demonstrated a stable 1.1 cm hypoechoic lesion in the left lobe. Repeat US will be performed today. AFP 15.4/AFPL3 7.0. AFP ordered today.

## 2023-07-20 LAB
AFP L3 MFR SERPL: 7.8 % (ref 0–9.9)
AFP SERPL-MCNC: 19 NG/ML (ref 0–9.2)

## 2023-07-31 ENCOUNTER — APPOINTMENT (OUTPATIENT)
Dept: INFUSION THERAPY | Age: 63
End: 2023-07-31

## 2023-07-31 ENCOUNTER — NURSE ONLY (OUTPATIENT)
Age: 63
End: 2023-07-31

## 2023-07-31 ENCOUNTER — HOSPITAL ENCOUNTER (OUTPATIENT)
Facility: HOSPITAL | Age: 63
Setting detail: INFUSION SERIES
Discharge: HOME | End: 2023-07-31
Payer: OTHER GOVERNMENT

## 2023-07-31 VITALS
HEART RATE: 76 BPM | RESPIRATION RATE: 16 BRPM | DIASTOLIC BLOOD PRESSURE: 73 MMHG | SYSTOLIC BLOOD PRESSURE: 133 MMHG | TEMPERATURE: 98.2 F

## 2023-07-31 DIAGNOSIS — K75.81 NASH (NONALCOHOLIC STEATOHEPATITIS): Primary | ICD-10-CM

## 2023-07-31 DIAGNOSIS — Z00.6 RESEARCH EXAM: Primary | ICD-10-CM

## 2023-07-31 PROCEDURE — 2500000003 HC RX 250 WO HCPCS: Performed by: INTERNAL MEDICINE

## 2023-07-31 PROCEDURE — 2580000003 HC RX 258: Performed by: INTERNAL MEDICINE

## 2023-07-31 PROCEDURE — 96365 THER/PROPH/DIAG IV INF INIT: CPT

## 2023-07-31 RX ORDER — SODIUM CHLORIDE 0.9 % (FLUSH) 0.9 %
5-40 SYRINGE (ML) INJECTION PRN
Status: DISCONTINUED | OUTPATIENT
Start: 2023-07-31 | End: 2023-08-01 | Stop reason: HOSPADM

## 2023-07-31 RX ORDER — ONDANSETRON 2 MG/ML
8 INJECTION INTRAMUSCULAR; INTRAVENOUS
Status: CANCELLED | OUTPATIENT
Start: 2023-07-31

## 2023-07-31 RX ORDER — ACETAMINOPHEN 325 MG/1
650 TABLET ORAL
Status: CANCELLED | OUTPATIENT
Start: 2023-07-31

## 2023-07-31 RX ORDER — SODIUM CHLORIDE 0.9 % (FLUSH) 0.9 %
5-40 SYRINGE (ML) INJECTION PRN
Status: CANCELLED | OUTPATIENT
Start: 2023-07-31

## 2023-07-31 RX ORDER — EPINEPHRINE 1 MG/ML
0.3 INJECTION, SOLUTION, CONCENTRATE INTRAVENOUS PRN
Status: CANCELLED | OUTPATIENT
Start: 2023-07-31

## 2023-07-31 RX ORDER — SODIUM CHLORIDE 9 MG/ML
5-250 INJECTION, SOLUTION INTRAVENOUS PRN
Status: DISCONTINUED | OUTPATIENT
Start: 2023-07-31 | End: 2023-08-01 | Stop reason: HOSPADM

## 2023-07-31 RX ORDER — SODIUM CHLORIDE 9 MG/ML
5-250 INJECTION, SOLUTION INTRAVENOUS PRN
Status: CANCELLED | OUTPATIENT
Start: 2023-07-31

## 2023-07-31 RX ORDER — DIPHENHYDRAMINE HYDROCHLORIDE 50 MG/ML
50 INJECTION INTRAMUSCULAR; INTRAVENOUS
Status: CANCELLED | OUTPATIENT
Start: 2023-07-31

## 2023-07-31 RX ORDER — SODIUM CHLORIDE 9 MG/ML
INJECTION, SOLUTION INTRAVENOUS CONTINUOUS
Status: CANCELLED | OUTPATIENT
Start: 2023-07-31

## 2023-07-31 RX ORDER — ALBUTEROL SULFATE 90 UG/1
4 AEROSOL, METERED RESPIRATORY (INHALATION) PRN
Status: CANCELLED | OUTPATIENT
Start: 2023-07-31

## 2023-07-31 RX ADMIN — SODIUM CHLORIDE 25 ML/HR: 9 INJECTION, SOLUTION INTRAVENOUS at 09:13

## 2023-07-31 RX ADMIN — Medication: at 09:34

## 2023-07-31 RX ADMIN — SODIUM CHLORIDE, PRESERVATIVE FREE 10 ML: 5 INJECTION INTRAVENOUS at 09:12

## 2023-07-31 ASSESSMENT — PAIN SCALES - GENERAL: PAINLEVEL_OUTOF10: 0

## 2023-08-14 ENCOUNTER — OFFICE VISIT (OUTPATIENT)
Age: 63
End: 2023-08-14

## 2023-08-14 ENCOUNTER — HOSPITAL ENCOUNTER (OUTPATIENT)
Facility: HOSPITAL | Age: 63
Setting detail: INFUSION SERIES
Discharge: HOME | End: 2023-08-14
Payer: OTHER GOVERNMENT

## 2023-08-14 ENCOUNTER — APPOINTMENT (OUTPATIENT)
Dept: INFUSION THERAPY | Age: 63
End: 2023-08-14

## 2023-08-14 VITALS
HEART RATE: 67 BPM | TEMPERATURE: 97.5 F | SYSTOLIC BLOOD PRESSURE: 117 MMHG | DIASTOLIC BLOOD PRESSURE: 77 MMHG | RESPIRATION RATE: 20 BRPM

## 2023-08-14 DIAGNOSIS — K75.81 NASH (NONALCOHOLIC STEATOHEPATITIS): Primary | ICD-10-CM

## 2023-08-14 DIAGNOSIS — Z00.6 RESEARCH EXAM: Primary | ICD-10-CM

## 2023-08-14 PROCEDURE — 2580000003 HC RX 258: Performed by: INTERNAL MEDICINE

## 2023-08-14 PROCEDURE — 2500000003 HC RX 250 WO HCPCS: Performed by: INTERNAL MEDICINE

## 2023-08-14 PROCEDURE — 99214 OFFICE O/P EST MOD 30 MIN: CPT | Performed by: NURSE PRACTITIONER

## 2023-08-14 PROCEDURE — 91200 LIVER ELASTOGRAPHY: CPT | Performed by: NURSE PRACTITIONER

## 2023-08-14 PROCEDURE — 96365 THER/PROPH/DIAG IV INF INIT: CPT

## 2023-08-14 RX ORDER — SODIUM CHLORIDE 9 MG/ML
5-250 INJECTION, SOLUTION INTRAVENOUS PRN
Status: CANCELLED | OUTPATIENT
Start: 2023-08-28

## 2023-08-14 RX ORDER — DIPHENHYDRAMINE HYDROCHLORIDE 50 MG/ML
50 INJECTION INTRAMUSCULAR; INTRAVENOUS
Status: CANCELLED | OUTPATIENT
Start: 2023-08-14

## 2023-08-14 RX ORDER — ALBUTEROL SULFATE 90 UG/1
4 AEROSOL, METERED RESPIRATORY (INHALATION) PRN
Status: CANCELLED | OUTPATIENT
Start: 2023-08-14

## 2023-08-14 RX ORDER — SODIUM CHLORIDE 9 MG/ML
INJECTION, SOLUTION INTRAVENOUS CONTINUOUS
Status: CANCELLED | OUTPATIENT
Start: 2023-08-14

## 2023-08-14 RX ORDER — ONDANSETRON 2 MG/ML
8 INJECTION INTRAMUSCULAR; INTRAVENOUS
Status: CANCELLED | OUTPATIENT
Start: 2023-08-14

## 2023-08-14 RX ORDER — ACETAMINOPHEN 325 MG/1
650 TABLET ORAL
Status: CANCELLED | OUTPATIENT
Start: 2023-08-14

## 2023-08-14 RX ORDER — SODIUM CHLORIDE 9 MG/ML
5-250 INJECTION, SOLUTION INTRAVENOUS PRN
Status: DISCONTINUED | OUTPATIENT
Start: 2023-08-14 | End: 2023-08-15 | Stop reason: HOSPADM

## 2023-08-14 RX ORDER — SODIUM CHLORIDE 0.9 % (FLUSH) 0.9 %
5-40 SYRINGE (ML) INJECTION PRN
Status: CANCELLED | OUTPATIENT
Start: 2023-08-28

## 2023-08-14 RX ORDER — SODIUM CHLORIDE 0.9 % (FLUSH) 0.9 %
5-40 SYRINGE (ML) INJECTION PRN
Status: CANCELLED | OUTPATIENT
Start: 2023-08-14

## 2023-08-14 RX ORDER — EPINEPHRINE 1 MG/ML
0.3 INJECTION, SOLUTION, CONCENTRATE INTRAVENOUS PRN
Status: CANCELLED | OUTPATIENT
Start: 2023-08-14

## 2023-08-14 RX ORDER — SODIUM CHLORIDE 9 MG/ML
5-250 INJECTION, SOLUTION INTRAVENOUS PRN
Status: CANCELLED | OUTPATIENT
Start: 2023-08-14

## 2023-08-14 RX ADMIN — Medication: at 10:25

## 2023-08-14 RX ADMIN — SODIUM CHLORIDE 25 ML/HR: 9 INJECTION, SOLUTION INTRAVENOUS at 09:32

## 2023-08-14 NOTE — PROGRESS NOTES
MD Bebo, FACP, [place], [place]      CLEO Blackwell S Ousmane, PCNP-BC   Rosalind Claros, [facility]-AG   Dustin Aviles, FNP-C   Alhaji Lawson, FNP-C    Michael Winston, PCNP-BC       [address] [facility]    [facility], 615 [facility], [address]    [phone]    FAX: [phone] [address], 230 [facility], [address]    [phone]    FAX: [phone] [facility] NOTE    Bill Colorado is a 61 y.o. female with ZAFAR cirrhosis. The patient was seen today for infusion #27 visit for the [facility] clinical trial for patients with ZAFAR cirrhosis. The study is an infused therapy of Belapectin (GR-MD-02) versus placebo given every 2 weeks and evaluating is effects on fibrosis. Today's visit was conducted per the study protocol. The patient was asked if any symptoms, side effects or adverse events have occurred since the last study visit. A physical examination was performed per protocol. All symptoms reported by the patient and the findings of the physical examination were recorded in the clinical trial documents. Symptoms of clinical significance were:  None. Findings on physical examination of clinical significance were: None. Patient has underlying cirrhosis and is in need of [address] screening. [address] screening is being performed at appropriate intervals as part of the clinical trial. The most recent imaging was US performed in 7/2023. This demonstrated a stable 1 cm hypoechoic lesion in the left lobe. AFP 19. HISTOLOGY:  1/2022. [facility]. EkPa was 30.2. IQR/med 12%. . The results suggested a fibrosis level of F4.  The CAP

## 2023-08-28 ENCOUNTER — APPOINTMENT (OUTPATIENT)
Dept: INFUSION THERAPY | Age: 63
End: 2023-08-28

## 2023-08-28 ENCOUNTER — HOSPITAL ENCOUNTER (OUTPATIENT)
Facility: HOSPITAL | Age: 63
Setting detail: INFUSION SERIES
End: 2023-08-28
Payer: OTHER GOVERNMENT

## 2023-08-28 VITALS
SYSTOLIC BLOOD PRESSURE: 134 MMHG | DIASTOLIC BLOOD PRESSURE: 77 MMHG | RESPIRATION RATE: 16 BRPM | HEART RATE: 69 BPM | TEMPERATURE: 97.7 F

## 2023-08-28 DIAGNOSIS — K75.81 NASH (NONALCOHOLIC STEATOHEPATITIS): Primary | ICD-10-CM

## 2023-08-28 PROCEDURE — 2580000003 HC RX 258: Performed by: INTERNAL MEDICINE

## 2023-08-28 PROCEDURE — 96365 THER/PROPH/DIAG IV INF INIT: CPT

## 2023-08-28 PROCEDURE — 2500000003 HC RX 250 WO HCPCS: Performed by: INTERNAL MEDICINE

## 2023-08-28 RX ORDER — SODIUM CHLORIDE 9 MG/ML
INJECTION, SOLUTION INTRAVENOUS CONTINUOUS
Status: CANCELLED | OUTPATIENT
Start: 2023-09-11

## 2023-08-28 RX ORDER — ALBUTEROL SULFATE 90 UG/1
4 AEROSOL, METERED RESPIRATORY (INHALATION) PRN
Status: CANCELLED | OUTPATIENT
Start: 2023-09-11

## 2023-08-28 RX ORDER — SODIUM CHLORIDE 0.9 % (FLUSH) 0.9 %
5-40 SYRINGE (ML) INJECTION PRN
Status: CANCELLED | OUTPATIENT
Start: 2023-09-11

## 2023-08-28 RX ORDER — SODIUM CHLORIDE 9 MG/ML
5-250 INJECTION, SOLUTION INTRAVENOUS PRN
Status: DISCONTINUED | OUTPATIENT
Start: 2023-08-28 | End: 2023-08-29 | Stop reason: HOSPADM

## 2023-08-28 RX ORDER — EPINEPHRINE 1 MG/ML
0.3 INJECTION, SOLUTION, CONCENTRATE INTRAVENOUS PRN
Status: CANCELLED | OUTPATIENT
Start: 2023-09-11

## 2023-08-28 RX ORDER — SODIUM CHLORIDE 9 MG/ML
5-250 INJECTION, SOLUTION INTRAVENOUS PRN
Status: CANCELLED | OUTPATIENT
Start: 2023-09-11

## 2023-08-28 RX ORDER — ACETAMINOPHEN 325 MG/1
650 TABLET ORAL
Status: CANCELLED | OUTPATIENT
Start: 2023-09-11

## 2023-08-28 RX ORDER — DIPHENHYDRAMINE HYDROCHLORIDE 50 MG/ML
50 INJECTION INTRAMUSCULAR; INTRAVENOUS
Status: CANCELLED | OUTPATIENT
Start: 2023-09-11

## 2023-08-28 RX ORDER — ONDANSETRON 2 MG/ML
8 INJECTION INTRAMUSCULAR; INTRAVENOUS
Status: CANCELLED | OUTPATIENT
Start: 2023-09-11

## 2023-08-28 RX ADMIN — Medication: at 09:50

## 2023-08-28 RX ADMIN — SODIUM CHLORIDE 25 ML/HR: 9 INJECTION, SOLUTION INTRAVENOUS at 09:20

## 2023-08-30 ENCOUNTER — TELEPHONE (OUTPATIENT)
Age: 63
End: 2023-08-30

## 2023-09-11 ENCOUNTER — APPOINTMENT (OUTPATIENT)
Dept: INFUSION THERAPY | Age: 63
End: 2023-09-11

## 2023-09-11 ENCOUNTER — HOSPITAL ENCOUNTER (OUTPATIENT)
Facility: HOSPITAL | Age: 63
Setting detail: INFUSION SERIES
Discharge: HOME | End: 2023-09-11
Payer: OTHER GOVERNMENT

## 2023-09-11 VITALS
HEART RATE: 74 BPM | SYSTOLIC BLOOD PRESSURE: 136 MMHG | TEMPERATURE: 97.9 F | DIASTOLIC BLOOD PRESSURE: 74 MMHG | RESPIRATION RATE: 16 BRPM

## 2023-09-11 DIAGNOSIS — K75.81 NASH (NONALCOHOLIC STEATOHEPATITIS): Primary | ICD-10-CM

## 2023-09-11 PROCEDURE — 96365 THER/PROPH/DIAG IV INF INIT: CPT

## 2023-09-11 PROCEDURE — 2580000003 HC RX 258: Performed by: INTERNAL MEDICINE

## 2023-09-11 PROCEDURE — 2500000003 HC RX 250 WO HCPCS: Performed by: INTERNAL MEDICINE

## 2023-09-11 RX ORDER — EPINEPHRINE 1 MG/ML
0.3 INJECTION, SOLUTION, CONCENTRATE INTRAVENOUS PRN
Status: CANCELLED | OUTPATIENT
Start: 2023-09-25

## 2023-09-11 RX ORDER — ALBUTEROL SULFATE 90 UG/1
4 AEROSOL, METERED RESPIRATORY (INHALATION) PRN
Status: CANCELLED | OUTPATIENT
Start: 2023-09-25

## 2023-09-11 RX ORDER — DIPHENHYDRAMINE HYDROCHLORIDE 50 MG/ML
50 INJECTION INTRAMUSCULAR; INTRAVENOUS
Status: CANCELLED | OUTPATIENT
Start: 2023-09-25

## 2023-09-11 RX ORDER — ONDANSETRON 2 MG/ML
8 INJECTION INTRAMUSCULAR; INTRAVENOUS
Status: CANCELLED | OUTPATIENT
Start: 2023-09-25

## 2023-09-11 RX ORDER — SODIUM CHLORIDE 0.9 % (FLUSH) 0.9 %
5-40 SYRINGE (ML) INJECTION PRN
Status: CANCELLED | OUTPATIENT
Start: 2023-09-25

## 2023-09-11 RX ORDER — SODIUM CHLORIDE 9 MG/ML
5-250 INJECTION, SOLUTION INTRAVENOUS PRN
Status: CANCELLED | OUTPATIENT
Start: 2023-09-25

## 2023-09-11 RX ORDER — SODIUM CHLORIDE 0.9 % (FLUSH) 0.9 %
5-40 SYRINGE (ML) INJECTION PRN
Status: DISCONTINUED | OUTPATIENT
Start: 2023-09-11 | End: 2023-09-12 | Stop reason: HOSPADM

## 2023-09-11 RX ORDER — SODIUM CHLORIDE 9 MG/ML
INJECTION, SOLUTION INTRAVENOUS CONTINUOUS
Status: CANCELLED | OUTPATIENT
Start: 2023-09-25

## 2023-09-11 RX ORDER — ACETAMINOPHEN 325 MG/1
650 TABLET ORAL
Status: CANCELLED | OUTPATIENT
Start: 2023-09-25

## 2023-09-11 RX ADMIN — Medication: at 09:55

## 2023-09-11 RX ADMIN — SODIUM CHLORIDE, PRESERVATIVE FREE 10 ML: 5 INJECTION INTRAVENOUS at 09:50

## 2023-09-25 ENCOUNTER — APPOINTMENT (OUTPATIENT)
Facility: HOSPITAL | Age: 63
End: 2023-09-25
Payer: OTHER GOVERNMENT

## 2023-09-25 ENCOUNTER — APPOINTMENT (OUTPATIENT)
Dept: INFUSION THERAPY | Age: 63
End: 2023-09-25

## 2023-09-26 ENCOUNTER — HOSPITAL ENCOUNTER (OUTPATIENT)
Facility: HOSPITAL | Age: 63
Setting detail: INFUSION SERIES
End: 2023-09-26
Payer: OTHER GOVERNMENT

## 2023-09-26 VITALS
DIASTOLIC BLOOD PRESSURE: 70 MMHG | SYSTOLIC BLOOD PRESSURE: 128 MMHG | TEMPERATURE: 97.9 F | RESPIRATION RATE: 18 BRPM | HEART RATE: 64 BPM

## 2023-09-26 DIAGNOSIS — K75.81 NASH (NONALCOHOLIC STEATOHEPATITIS): Primary | ICD-10-CM

## 2023-09-26 PROCEDURE — 2500000003 HC RX 250 WO HCPCS: Performed by: INTERNAL MEDICINE

## 2023-09-26 PROCEDURE — 96365 THER/PROPH/DIAG IV INF INIT: CPT

## 2023-09-26 PROCEDURE — 2580000003 HC RX 258: Performed by: INTERNAL MEDICINE

## 2023-09-26 RX ORDER — SODIUM CHLORIDE 9 MG/ML
5-250 INJECTION, SOLUTION INTRAVENOUS PRN
Status: DISCONTINUED | OUTPATIENT
Start: 2023-09-26 | End: 2023-09-27 | Stop reason: HOSPADM

## 2023-09-26 RX ORDER — SODIUM CHLORIDE 9 MG/ML
5-250 INJECTION, SOLUTION INTRAVENOUS PRN
Status: CANCELLED | OUTPATIENT
Start: 2023-10-09

## 2023-09-26 RX ORDER — SODIUM CHLORIDE 9 MG/ML
INJECTION, SOLUTION INTRAVENOUS CONTINUOUS
Status: CANCELLED | OUTPATIENT
Start: 2023-10-09

## 2023-09-26 RX ORDER — SODIUM CHLORIDE 0.9 % (FLUSH) 0.9 %
5-40 SYRINGE (ML) INJECTION PRN
Status: CANCELLED | OUTPATIENT
Start: 2023-10-09

## 2023-09-26 RX ORDER — ACETAMINOPHEN 325 MG/1
650 TABLET ORAL
Status: CANCELLED | OUTPATIENT
Start: 2023-10-09

## 2023-09-26 RX ORDER — DIPHENHYDRAMINE HYDROCHLORIDE 50 MG/ML
50 INJECTION INTRAMUSCULAR; INTRAVENOUS
Status: CANCELLED | OUTPATIENT
Start: 2023-10-09

## 2023-09-26 RX ORDER — ALBUTEROL SULFATE 90 UG/1
4 AEROSOL, METERED RESPIRATORY (INHALATION) PRN
Status: CANCELLED | OUTPATIENT
Start: 2023-10-09

## 2023-09-26 RX ORDER — ONDANSETRON 2 MG/ML
8 INJECTION INTRAMUSCULAR; INTRAVENOUS
Status: CANCELLED | OUTPATIENT
Start: 2023-10-09

## 2023-09-26 RX ORDER — SODIUM CHLORIDE 0.9 % (FLUSH) 0.9 %
5-40 SYRINGE (ML) INJECTION PRN
Status: DISCONTINUED | OUTPATIENT
Start: 2023-09-26 | End: 2023-09-27 | Stop reason: HOSPADM

## 2023-09-26 RX ORDER — EPINEPHRINE 1 MG/ML
0.3 INJECTION, SOLUTION, CONCENTRATE INTRAVENOUS PRN
Status: CANCELLED | OUTPATIENT
Start: 2023-10-09

## 2023-09-26 RX ADMIN — Medication: at 09:43

## 2023-09-26 RX ADMIN — SODIUM CHLORIDE 25 ML/HR: 9 INJECTION, SOLUTION INTRAVENOUS at 09:15

## 2023-10-02 ENCOUNTER — TELEPHONE (OUTPATIENT)
Age: 63
End: 2023-10-02

## 2023-10-02 NOTE — TELEPHONE ENCOUNTER
Spoke with patient and advised we've contacted [facility] regarding the $309.00 bill she received from them to bill us.

## 2023-10-09 ENCOUNTER — OFFICE VISIT (OUTPATIENT)
Age: 63
End: 2023-10-09

## 2023-10-09 ENCOUNTER — HOSPITAL ENCOUNTER (OUTPATIENT)
Facility: HOSPITAL | Age: 63
Setting detail: INFUSION SERIES
End: 2023-10-09
Payer: OTHER GOVERNMENT

## 2023-10-09 ENCOUNTER — APPOINTMENT (OUTPATIENT)
Dept: INFUSION THERAPY | Age: 63
End: 2023-10-09

## 2023-10-09 VITALS
DIASTOLIC BLOOD PRESSURE: 66 MMHG | SYSTOLIC BLOOD PRESSURE: 121 MMHG | TEMPERATURE: 97.7 F | HEART RATE: 73 BPM | RESPIRATION RATE: 16 BRPM

## 2023-10-09 VITALS — WEIGHT: 199.08 LBS | BODY MASS INDEX: 30.27 KG/M2

## 2023-10-09 DIAGNOSIS — K75.81 NASH (NONALCOHOLIC STEATOHEPATITIS): Primary | ICD-10-CM

## 2023-10-09 DIAGNOSIS — Z00.6 RESEARCH EXAM: ICD-10-CM

## 2023-10-09 DIAGNOSIS — K76.9 LIVER LESION: Primary | ICD-10-CM

## 2023-10-09 PROCEDURE — 2580000003 HC RX 258: Performed by: INTERNAL MEDICINE

## 2023-10-09 PROCEDURE — 2500000003 HC RX 250 WO HCPCS: Performed by: INTERNAL MEDICINE

## 2023-10-09 PROCEDURE — 99214 OFFICE O/P EST MOD 30 MIN: CPT | Performed by: NURSE PRACTITIONER

## 2023-10-09 PROCEDURE — 96365 THER/PROPH/DIAG IV INF INIT: CPT

## 2023-10-09 RX ORDER — ALBUTEROL SULFATE 90 UG/1
4 AEROSOL, METERED RESPIRATORY (INHALATION) PRN
OUTPATIENT
Start: 2023-10-23

## 2023-10-09 RX ORDER — SODIUM CHLORIDE 9 MG/ML
5-250 INJECTION, SOLUTION INTRAVENOUS PRN
Status: CANCELLED | OUTPATIENT
Start: 2023-10-23

## 2023-10-09 RX ORDER — DIPHENHYDRAMINE HYDROCHLORIDE 50 MG/ML
50 INJECTION INTRAMUSCULAR; INTRAVENOUS
OUTPATIENT
Start: 2023-10-23

## 2023-10-09 RX ORDER — SODIUM CHLORIDE 0.9 % (FLUSH) 0.9 %
5-40 SYRINGE (ML) INJECTION PRN
Status: DISCONTINUED | OUTPATIENT
Start: 2023-10-09 | End: 2023-10-10 | Stop reason: HOSPADM

## 2023-10-09 RX ORDER — ACETAMINOPHEN 325 MG/1
650 TABLET ORAL
OUTPATIENT
Start: 2023-10-23

## 2023-10-09 RX ORDER — SODIUM CHLORIDE 0.9 % (FLUSH) 0.9 %
5-40 SYRINGE (ML) INJECTION PRN
Status: CANCELLED | OUTPATIENT
Start: 2023-10-23

## 2023-10-09 RX ORDER — SODIUM CHLORIDE 9 MG/ML
INJECTION, SOLUTION INTRAVENOUS CONTINUOUS
OUTPATIENT
Start: 2023-10-23

## 2023-10-09 RX ORDER — EPINEPHRINE 1 MG/ML
0.3 INJECTION, SOLUTION, CONCENTRATE INTRAVENOUS PRN
OUTPATIENT
Start: 2023-10-23

## 2023-10-09 RX ORDER — SODIUM CHLORIDE 9 MG/ML
5-250 INJECTION, SOLUTION INTRAVENOUS PRN
Status: DISCONTINUED | OUTPATIENT
Start: 2023-10-09 | End: 2023-10-10 | Stop reason: HOSPADM

## 2023-10-09 RX ORDER — ONDANSETRON 2 MG/ML
8 INJECTION INTRAMUSCULAR; INTRAVENOUS
OUTPATIENT
Start: 2023-10-23

## 2023-10-09 RX ADMIN — SODIUM CHLORIDE 30 ML/HR: 9 INJECTION, SOLUTION INTRAVENOUS at 09:23

## 2023-10-09 RX ADMIN — Medication: at 09:48

## 2023-10-09 RX ADMIN — SODIUM CHLORIDE, PRESERVATIVE FREE 10 ML: 5 INJECTION INTRAVENOUS at 09:23

## 2023-10-09 NOTE — PROGRESS NOTES
MD Bebo, FACP, [place], [place]      CLEO Siddiqui S Ousmane, [facility]-BC   Amadeo Lock, [facility]-   Marissa Yoder, FNP-C   Jason Wasserman, FNP-C    Newton Pfeiffer, [facility]-BC       [address] [facility]    1775 [facility], 615 [facility], [address]    [phone]    FAX: [phone] [address], 833 [facility], [address]    [phone]    FAX: [address] NOTE    Cindy Saldivar is a 61 y.o. female with ZAFAR cirrhosis. The patient was seen today for infusion #31 visit for the [facility] clinical trial for patients with ZAFAR cirrhosis. The study is an infused therapy of Belapectin (GR-MD-02) versus placebo given every 2 weeks and evaluating is effects on fibrosis. Today's visit was conducted per the study protocol. The patient was asked if any symptoms, side effects or adverse events have occurred since the last study visit. A physical examination was performed per protocol. All symptoms reported by the patient and the findings of the physical examination were recorded in the clinical trial documents. Symptoms of clinical significance were:  None. Findings on physical examination of clinical significance were: None. She will be having Right knee replacement surgery on 10/25/23. Patient has underlying cirrhosis and is in need of [address] screening. [address] screening is being performed at appropriate intervals as part of the clinical trial. The most recent imaging was US performed in 7/2023. This demonstrated a stable 1 cm hypoechoic lesion in the left lobe. AFP 19. HISTOLOGY:  1/2022. [facility]. EkPa was 30.2. IQR/med 12%.

## 2023-10-19 ENCOUNTER — CLINICAL DOCUMENTATION (OUTPATIENT)
Age: 63
End: 2023-10-19

## 2023-10-23 ENCOUNTER — HOSPITAL ENCOUNTER (OUTPATIENT)
Facility: HOSPITAL | Age: 63
Setting detail: INFUSION SERIES
Discharge: HOME | End: 2023-10-23
Payer: OTHER GOVERNMENT

## 2023-10-23 ENCOUNTER — NURSE ONLY (OUTPATIENT)
Age: 63
End: 2023-10-23

## 2023-10-23 VITALS
SYSTOLIC BLOOD PRESSURE: 124 MMHG | HEART RATE: 74 BPM | RESPIRATION RATE: 16 BRPM | TEMPERATURE: 97.9 F | DIASTOLIC BLOOD PRESSURE: 74 MMHG

## 2023-10-23 DIAGNOSIS — K75.81 NASH (NONALCOHOLIC STEATOHEPATITIS): Primary | ICD-10-CM

## 2023-10-23 DIAGNOSIS — Z00.6 RESEARCH EXAM: Primary | ICD-10-CM

## 2023-10-23 PROCEDURE — 96365 THER/PROPH/DIAG IV INF INIT: CPT

## 2023-10-23 PROCEDURE — 2580000003 HC RX 258: Performed by: INTERNAL MEDICINE

## 2023-10-23 PROCEDURE — 2500000003 HC RX 250 WO HCPCS: Performed by: INTERNAL MEDICINE

## 2023-10-23 RX ORDER — ONDANSETRON 2 MG/ML
8 INJECTION INTRAMUSCULAR; INTRAVENOUS
OUTPATIENT
Start: 2023-11-06

## 2023-10-23 RX ORDER — EPINEPHRINE 1 MG/ML
0.3 INJECTION, SOLUTION, CONCENTRATE INTRAVENOUS PRN
OUTPATIENT
Start: 2023-11-06

## 2023-10-23 RX ORDER — DIPHENHYDRAMINE HYDROCHLORIDE 50 MG/ML
50 INJECTION INTRAMUSCULAR; INTRAVENOUS
OUTPATIENT
Start: 2023-11-06

## 2023-10-23 RX ORDER — SODIUM CHLORIDE 9 MG/ML
5-250 INJECTION, SOLUTION INTRAVENOUS PRN
OUTPATIENT
Start: 2023-11-06

## 2023-10-23 RX ORDER — SODIUM CHLORIDE 9 MG/ML
5-250 INJECTION, SOLUTION INTRAVENOUS PRN
Status: DISCONTINUED | OUTPATIENT
Start: 2023-10-23 | End: 2023-10-24 | Stop reason: HOSPADM

## 2023-10-23 RX ORDER — SODIUM CHLORIDE 9 MG/ML
INJECTION, SOLUTION INTRAVENOUS CONTINUOUS
OUTPATIENT
Start: 2023-11-06

## 2023-10-23 RX ORDER — SODIUM CHLORIDE 0.9 % (FLUSH) 0.9 %
5-40 SYRINGE (ML) INJECTION PRN
OUTPATIENT
Start: 2023-11-06

## 2023-10-23 RX ORDER — SODIUM CHLORIDE 0.9 % (FLUSH) 0.9 %
5-40 SYRINGE (ML) INJECTION PRN
Status: DISCONTINUED | OUTPATIENT
Start: 2023-10-23 | End: 2023-10-24 | Stop reason: HOSPADM

## 2023-10-23 RX ORDER — ALBUTEROL SULFATE 90 UG/1
4 AEROSOL, METERED RESPIRATORY (INHALATION) PRN
OUTPATIENT
Start: 2023-11-06

## 2023-10-23 RX ORDER — ACETAMINOPHEN 325 MG/1
650 TABLET ORAL
OUTPATIENT
Start: 2023-11-06

## 2023-10-23 RX ADMIN — SODIUM CHLORIDE 25 ML/HR: 9 INJECTION, SOLUTION INTRAVENOUS at 09:09

## 2023-10-23 RX ADMIN — Medication: at 09:32

## 2023-10-23 RX ADMIN — SODIUM CHLORIDE, PRESERVATIVE FREE 10 ML: 5 INJECTION INTRAVENOUS at 09:09

## 2023-11-06 ENCOUNTER — OFFICE VISIT (OUTPATIENT)
Age: 63
End: 2023-11-06

## 2023-11-06 DIAGNOSIS — Z00.6 RESEARCH EXAM: Primary | ICD-10-CM

## 2023-11-06 PROCEDURE — 99214 OFFICE O/P EST MOD 30 MIN: CPT | Performed by: NURSE PRACTITIONER

## 2023-11-06 RX ORDER — FAMOTIDINE 20 MG/1
20 TABLET, FILM COATED ORAL 2 TIMES DAILY
COMMUNITY

## 2023-11-06 RX ORDER — METRONIDAZOLE 500 MG/1
500 TABLET ORAL 3 TIMES DAILY
COMMUNITY

## 2023-11-06 RX ORDER — CELECOXIB 100 MG/1
100 CAPSULE ORAL 2 TIMES DAILY
COMMUNITY

## 2023-11-06 RX ORDER — ASPIRIN 81 MG/1
81 TABLET ORAL DAILY
COMMUNITY

## 2023-11-06 RX ORDER — CIPROFLOXACIN 250 MG/1
250 TABLET, FILM COATED ORAL 2 TIMES DAILY
COMMUNITY

## 2023-11-06 NOTE — PROGRESS NOTES
had R knee repalcement on 10/25/23. She has had diarrhea since 10/29-10/31. She is running a low grade fever today 100.8, BP 93/59 and HR was 108. She went [facility] last night 11/5/23. She was given Flagyl and Cipro yesterday. She may also have a UTI. She is currently being treated for C.diff. Recommended that she follow up with PCP or Ortho if fever does not go down or goes up, or go to ER. HISTOLOGY:  1/2022. FibroScan performed at [address] [place] [address]. EkPa was 30.2. IQR/med 12%. . The results suggested a fibrosis level of F4. The CAP score suggests there is no significant hepatic steatosis. 8/02/2022. Liver Biopsy. Macrovesicular steatosis involving 5% of the parenchyma, ballooning present, lobular and portal inflammation and cirrhosis. [place] 4.   8/2022. FibroScan performed at [address] [place] [address]. EkPa was 61.0. IQR/med 6%. . The results suggested a fibrosis level of F4. The CAP score suggests there is hepatic steatosis. 2/2023. FibroScan performed at [address] [place] [address]. EkPa was 23.1. IQR/med 13%. . The results suggested a fibrosis level of F4. The CAP score suggests there is no significant hepatic steatosis. 8/2023. FibroScan performed at [address] [place] [address]. EkPa was 20.3. IQR/med 6%. . The results suggested a fibrosis level of F4. The CAP score suggests there is hepatic steatosis. RADIOLOGY:  7/2022. Ultrasound of liver. Echogenic consistent with cirrhosis. No dilated bile ducts. No ascites. Mild ascites. Solid hypoechoic mass in the left lobe measuring 1.1 x 0.8 x 0.9 cm. Adjacent to this is a 0.8 x 0.8 x 0.8 cm mass. 7/2022. MRI of liver. Cirrhotic appearing liver. No suspicious mass or lesion. 1.5 x 1.4 cm nonenhancing cyst in the upper central liver. Left lobe hyperintense lesions largest 4 mm likely a cyst. There is a 5 mm cyst in the right kidney and a 7 mm cyst in the left kidney. 1/2023.

## 2023-11-13 ENCOUNTER — HOSPITAL ENCOUNTER (OUTPATIENT)
Facility: HOSPITAL | Age: 63
Setting detail: INFUSION SERIES
End: 2023-11-13

## 2023-11-13 ENCOUNTER — NURSE ONLY (OUTPATIENT)
Age: 63
End: 2023-11-13

## 2023-11-13 DIAGNOSIS — Z00.6 RESEARCH EXAM: Primary | ICD-10-CM

## 2023-11-20 ENCOUNTER — HOSPITAL ENCOUNTER (OUTPATIENT)
Facility: HOSPITAL | Age: 63
Setting detail: INFUSION SERIES
Discharge: HOME | End: 2023-11-20
Payer: OTHER GOVERNMENT

## 2023-11-20 ENCOUNTER — OFFICE VISIT (OUTPATIENT)
Age: 63
End: 2023-11-20

## 2023-11-20 VITALS
RESPIRATION RATE: 16 BRPM | DIASTOLIC BLOOD PRESSURE: 57 MMHG | HEART RATE: 74 BPM | SYSTOLIC BLOOD PRESSURE: 105 MMHG | TEMPERATURE: 98 F

## 2023-11-20 DIAGNOSIS — Z00.6 RESEARCH EXAM: Primary | ICD-10-CM

## 2023-11-20 DIAGNOSIS — K75.81 NASH (NONALCOHOLIC STEATOHEPATITIS): Primary | ICD-10-CM

## 2023-11-20 PROCEDURE — 2500000003 HC RX 250 WO HCPCS: Performed by: INTERNAL MEDICINE

## 2023-11-20 PROCEDURE — 96365 THER/PROPH/DIAG IV INF INIT: CPT

## 2023-11-20 PROCEDURE — 96360 HYDRATION IV INFUSION INIT: CPT

## 2023-11-20 PROCEDURE — 99214 OFFICE O/P EST MOD 30 MIN: CPT | Performed by: NURSE PRACTITIONER

## 2023-11-20 PROCEDURE — 2580000003 HC RX 258: Performed by: INTERNAL MEDICINE

## 2023-11-20 RX ORDER — SODIUM CHLORIDE 0.9 % (FLUSH) 0.9 %
5-40 SYRINGE (ML) INJECTION PRN
OUTPATIENT
Start: 2023-12-04

## 2023-11-20 RX ORDER — SODIUM CHLORIDE 9 MG/ML
INJECTION, SOLUTION INTRAVENOUS CONTINUOUS
OUTPATIENT
Start: 2023-12-04

## 2023-11-20 RX ORDER — ACETAMINOPHEN 325 MG/1
650 TABLET ORAL
OUTPATIENT
Start: 2023-12-04

## 2023-11-20 RX ORDER — EPINEPHRINE 1 MG/ML
0.3 INJECTION, SOLUTION, CONCENTRATE INTRAVENOUS PRN
OUTPATIENT
Start: 2023-12-04

## 2023-11-20 RX ORDER — ONDANSETRON 2 MG/ML
8 INJECTION INTRAMUSCULAR; INTRAVENOUS
OUTPATIENT
Start: 2023-12-04

## 2023-11-20 RX ORDER — DIPHENHYDRAMINE HYDROCHLORIDE 50 MG/ML
50 INJECTION INTRAMUSCULAR; INTRAVENOUS
OUTPATIENT
Start: 2023-12-04

## 2023-11-20 RX ORDER — ALBUTEROL SULFATE 90 UG/1
4 AEROSOL, METERED RESPIRATORY (INHALATION) PRN
OUTPATIENT
Start: 2023-12-04

## 2023-11-20 RX ORDER — SODIUM CHLORIDE 9 MG/ML
5-250 INJECTION, SOLUTION INTRAVENOUS PRN
OUTPATIENT
Start: 2023-12-04

## 2023-11-20 RX ORDER — SODIUM CHLORIDE 0.9 % (FLUSH) 0.9 %
5-40 SYRINGE (ML) INJECTION PRN
Status: DISCONTINUED | OUTPATIENT
Start: 2023-11-20 | End: 2023-11-21 | Stop reason: HOSPADM

## 2023-11-20 RX ADMIN — Medication: at 10:10

## 2023-11-20 ASSESSMENT — PAIN DESCRIPTION - ORIENTATION: ORIENTATION: LEFT

## 2023-11-20 ASSESSMENT — PAIN SCALES - GENERAL: PAINLEVEL_OUTOF10: 1

## 2023-11-20 ASSESSMENT — PAIN DESCRIPTION - LOCATION: LOCATION: KNEE

## 2023-11-20 NOTE — PROGRESS NOTES
ascites. Follow-up per protocol. Documentation reviewed and updated to reflect current, accurate patient information.       TOMMIE Degroot-MASOOD  [facility]  [id number] [place] [address], [address] [place] [address]  [phone]  [facility]

## 2023-12-04 ENCOUNTER — OFFICE VISIT (OUTPATIENT)
Age: 63
End: 2023-12-04

## 2023-12-04 ENCOUNTER — HOSPITAL ENCOUNTER (OUTPATIENT)
Facility: HOSPITAL | Age: 63
Setting detail: INFUSION SERIES
Discharge: HOME | End: 2023-12-04
Payer: OTHER GOVERNMENT

## 2023-12-04 VITALS
RESPIRATION RATE: 18 BRPM | SYSTOLIC BLOOD PRESSURE: 130 MMHG | DIASTOLIC BLOOD PRESSURE: 67 MMHG | TEMPERATURE: 97.7 F | HEART RATE: 76 BPM

## 2023-12-04 DIAGNOSIS — K75.81 NASH (NONALCOHOLIC STEATOHEPATITIS): Primary | ICD-10-CM

## 2023-12-04 DIAGNOSIS — Z00.6 RESEARCH EXAM: ICD-10-CM

## 2023-12-04 PROCEDURE — 96365 THER/PROPH/DIAG IV INF INIT: CPT

## 2023-12-04 PROCEDURE — 2580000003 HC RX 258: Performed by: INTERNAL MEDICINE

## 2023-12-04 PROCEDURE — 2500000003 HC RX 250 WO HCPCS: Performed by: INTERNAL MEDICINE

## 2023-12-04 RX ORDER — SODIUM CHLORIDE 9 MG/ML
5-250 INJECTION, SOLUTION INTRAVENOUS PRN
OUTPATIENT
Start: 2023-12-18

## 2023-12-04 RX ORDER — SODIUM CHLORIDE 9 MG/ML
5-250 INJECTION, SOLUTION INTRAVENOUS PRN
Status: DISCONTINUED | OUTPATIENT
Start: 2023-12-04 | End: 2023-12-05 | Stop reason: HOSPADM

## 2023-12-04 RX ORDER — ONDANSETRON 2 MG/ML
8 INJECTION INTRAMUSCULAR; INTRAVENOUS
OUTPATIENT
Start: 2023-12-18

## 2023-12-04 RX ORDER — DIPHENHYDRAMINE HYDROCHLORIDE 50 MG/ML
50 INJECTION INTRAMUSCULAR; INTRAVENOUS
OUTPATIENT
Start: 2023-12-18

## 2023-12-04 RX ORDER — EPINEPHRINE 1 MG/ML
0.3 INJECTION, SOLUTION INTRAMUSCULAR; SUBCUTANEOUS PRN
OUTPATIENT
Start: 2023-12-18

## 2023-12-04 RX ORDER — SODIUM CHLORIDE 9 MG/ML
INJECTION, SOLUTION INTRAVENOUS CONTINUOUS
OUTPATIENT
Start: 2023-12-18

## 2023-12-04 RX ORDER — ALBUTEROL SULFATE 90 UG/1
4 AEROSOL, METERED RESPIRATORY (INHALATION) PRN
OUTPATIENT
Start: 2023-12-18

## 2023-12-04 RX ORDER — ACETAMINOPHEN 325 MG/1
650 TABLET ORAL
OUTPATIENT
Start: 2023-12-18

## 2023-12-04 RX ORDER — SODIUM CHLORIDE 0.9 % (FLUSH) 0.9 %
5-40 SYRINGE (ML) INJECTION PRN
Status: DISCONTINUED | OUTPATIENT
Start: 2023-12-04 | End: 2023-12-05 | Stop reason: HOSPADM

## 2023-12-04 RX ORDER — SODIUM CHLORIDE 0.9 % (FLUSH) 0.9 %
5-40 SYRINGE (ML) INJECTION PRN
OUTPATIENT
Start: 2023-12-18

## 2023-12-04 RX ADMIN — SODIUM CHLORIDE, PRESERVATIVE FREE 10 ML: 5 INJECTION INTRAVENOUS at 09:21

## 2023-12-04 RX ADMIN — Medication: at 10:06

## 2023-12-04 RX ADMIN — SODIUM CHLORIDE 25 ML/HR: 9 INJECTION, SOLUTION INTRAVENOUS at 09:22

## 2023-12-04 ASSESSMENT — PAIN DESCRIPTION - LOCATION: LOCATION: KNEE

## 2023-12-04 ASSESSMENT — PAIN SCALES - GENERAL: PAINLEVEL_OUTOF10: 1

## 2023-12-04 ASSESSMENT — PAIN DESCRIPTION - ORIENTATION: ORIENTATION: LEFT

## 2023-12-04 NOTE — PROGRESS NOTES
[facility]. EkPa was 23.1. IQR/med 13%. . The results suggested a fibrosis level of F4. The CAP score suggests there is no significant hepatic steatosis. 8/2023. [facility]. EkPa was 20.3. IQR/med 6%. . The results suggested a fibrosis level of F4. The CAP score suggests there is hepatic steatosis. RADIOLOGY:  7/2022. Ultrasound of liver. Echogenic consistent with cirrhosis. No dilated bile ducts. No ascites. Mild ascites. Solid hypoechoic mass in the left lobe measuring 1.1 x 0.8 x 0.9 cm. Adjacent to this is a 0.8 x 0.8 x 0.8 cm mass. 7/2022. MRI of liver. Cirrhotic appearing liver. No suspicious mass or lesion. 1.5 x 1.4 cm nonenhancing cyst in the upper central liver. Left lobe hyperintense lesions largest 4 mm likely a cyst. There is a 5 mm cyst in the right kidney and a 7 mm cyst in the left kidney. 1/2023. Ultrasound of liver. Stable cirrhosis. Stable hypoechoic lesion in the left liver in keeping with a nonaggressive finding on MRI. No new liver mass. No ascites. 7/2023. Ultrasound of liver. Stable cirrhosis. There is a stable hypoechoic area in the left liver measuring 1 cm in keeping with a nonaggressive finding on prior MRI. There is no other focal liver mass. No ascites. Follow-up per protocol, 2 weeks.      Pedro Brizuela PA-C  Liver [facility], [address] [place] [address], [address] [place] [address]  [phone]  [facility]

## 2023-12-18 ENCOUNTER — HOSPITAL ENCOUNTER (OUTPATIENT)
Facility: HOSPITAL | Age: 63
Setting detail: INFUSION SERIES
Discharge: HOME | End: 2023-12-18
Payer: OTHER GOVERNMENT

## 2023-12-18 VITALS
SYSTOLIC BLOOD PRESSURE: 125 MMHG | DIASTOLIC BLOOD PRESSURE: 72 MMHG | HEART RATE: 76 BPM | RESPIRATION RATE: 16 BRPM | TEMPERATURE: 97.6 F

## 2023-12-18 DIAGNOSIS — K75.81 NASH (NONALCOHOLIC STEATOHEPATITIS): Primary | ICD-10-CM

## 2023-12-18 PROCEDURE — 96365 THER/PROPH/DIAG IV INF INIT: CPT

## 2023-12-18 PROCEDURE — 2580000003 HC RX 258: Performed by: INTERNAL MEDICINE

## 2023-12-18 PROCEDURE — 2500000003 HC RX 250 WO HCPCS: Performed by: INTERNAL MEDICINE

## 2023-12-18 RX ORDER — EPINEPHRINE 1 MG/ML
0.3 INJECTION, SOLUTION INTRAMUSCULAR; SUBCUTANEOUS PRN
OUTPATIENT
Start: 2024-01-01

## 2023-12-18 RX ORDER — DIPHENHYDRAMINE HYDROCHLORIDE 50 MG/ML
50 INJECTION INTRAMUSCULAR; INTRAVENOUS
OUTPATIENT
Start: 2024-01-01

## 2023-12-18 RX ORDER — SODIUM CHLORIDE 9 MG/ML
5-250 INJECTION, SOLUTION INTRAVENOUS PRN
OUTPATIENT
Start: 2024-01-01

## 2023-12-18 RX ORDER — SODIUM CHLORIDE 9 MG/ML
5-250 INJECTION, SOLUTION INTRAVENOUS PRN
Status: DISCONTINUED | OUTPATIENT
Start: 2023-12-18 | End: 2023-12-19 | Stop reason: HOSPADM

## 2023-12-18 RX ORDER — ONDANSETRON 2 MG/ML
8 INJECTION INTRAMUSCULAR; INTRAVENOUS
OUTPATIENT
Start: 2024-01-01

## 2023-12-18 RX ORDER — SODIUM CHLORIDE 0.9 % (FLUSH) 0.9 %
5-40 SYRINGE (ML) INJECTION PRN
OUTPATIENT
Start: 2024-01-01

## 2023-12-18 RX ORDER — ALBUTEROL SULFATE 90 UG/1
4 AEROSOL, METERED RESPIRATORY (INHALATION) PRN
OUTPATIENT
Start: 2024-01-01

## 2023-12-18 RX ORDER — ACETAMINOPHEN 325 MG/1
650 TABLET ORAL
OUTPATIENT
Start: 2024-01-01

## 2023-12-18 RX ORDER — SODIUM CHLORIDE 9 MG/ML
INJECTION, SOLUTION INTRAVENOUS CONTINUOUS
OUTPATIENT
Start: 2024-01-01

## 2023-12-18 RX ADMIN — SODIUM CHLORIDE 25 ML/HR: 9 INJECTION, SOLUTION INTRAVENOUS at 10:04

## 2023-12-18 RX ADMIN — Medication: at 11:12

## 2024-01-02 ENCOUNTER — HOSPITAL ENCOUNTER (OUTPATIENT)
Facility: HOSPITAL | Age: 64
Setting detail: INFUSION SERIES
Discharge: HOME | End: 2024-01-02
Payer: OTHER GOVERNMENT

## 2024-01-02 VITALS
TEMPERATURE: 97.5 F | RESPIRATION RATE: 18 BRPM | SYSTOLIC BLOOD PRESSURE: 130 MMHG | HEART RATE: 80 BPM | DIASTOLIC BLOOD PRESSURE: 62 MMHG

## 2024-01-02 DIAGNOSIS — K75.81 NASH (NONALCOHOLIC STEATOHEPATITIS): Primary | ICD-10-CM

## 2024-01-02 PROCEDURE — 2580000003 HC RX 258: Performed by: INTERNAL MEDICINE

## 2024-01-02 PROCEDURE — 2500000003 HC RX 250 WO HCPCS: Performed by: INTERNAL MEDICINE

## 2024-01-02 PROCEDURE — 96365 THER/PROPH/DIAG IV INF INIT: CPT

## 2024-01-02 RX ORDER — SODIUM CHLORIDE 9 MG/ML
INJECTION, SOLUTION INTRAVENOUS CONTINUOUS
OUTPATIENT
Start: 2024-01-15

## 2024-01-02 RX ORDER — SODIUM CHLORIDE 0.9 % (FLUSH) 0.9 %
5-40 SYRINGE (ML) INJECTION PRN
OUTPATIENT
Start: 2024-01-15

## 2024-01-02 RX ORDER — SODIUM CHLORIDE 9 MG/ML
5-250 INJECTION, SOLUTION INTRAVENOUS PRN
Status: DISCONTINUED | OUTPATIENT
Start: 2024-01-02 | End: 2024-01-03 | Stop reason: HOSPADM

## 2024-01-02 RX ORDER — EPINEPHRINE 1 MG/ML
0.3 INJECTION, SOLUTION INTRAMUSCULAR; SUBCUTANEOUS PRN
OUTPATIENT
Start: 2024-01-15

## 2024-01-02 RX ORDER — ACETAMINOPHEN 325 MG/1
650 TABLET ORAL
OUTPATIENT
Start: 2024-01-15

## 2024-01-02 RX ORDER — ALBUTEROL SULFATE 90 UG/1
4 AEROSOL, METERED RESPIRATORY (INHALATION) PRN
OUTPATIENT
Start: 2024-01-15

## 2024-01-02 RX ORDER — ONDANSETRON 2 MG/ML
8 INJECTION INTRAMUSCULAR; INTRAVENOUS
OUTPATIENT
Start: 2024-01-15

## 2024-01-02 RX ORDER — SODIUM CHLORIDE 9 MG/ML
5-250 INJECTION, SOLUTION INTRAVENOUS PRN
OUTPATIENT
Start: 2024-01-15

## 2024-01-02 RX ORDER — SODIUM CHLORIDE 0.9 % (FLUSH) 0.9 %
5-40 SYRINGE (ML) INJECTION PRN
Status: DISCONTINUED | OUTPATIENT
Start: 2024-01-02 | End: 2024-01-03 | Stop reason: HOSPADM

## 2024-01-02 RX ORDER — DIPHENHYDRAMINE HYDROCHLORIDE 50 MG/ML
50 INJECTION INTRAMUSCULAR; INTRAVENOUS
OUTPATIENT
Start: 2024-01-15

## 2024-01-02 RX ADMIN — Medication: at 10:04

## 2024-01-02 RX ADMIN — SODIUM CHLORIDE 25 ML/HR: 9 INJECTION, SOLUTION INTRAVENOUS at 09:40

## 2024-01-02 ASSESSMENT — PAIN SCALES - GENERAL: PAINLEVEL_OUTOF10: 0

## 2024-01-02 NOTE — PROGRESS NOTES
[facility] Peds/Adult Note                       Date: 2024    Name: Edda Stephenson    MRN: [id number]         : 1960 Patient arrives for Belapectin/placebo clinical trial without acute problems. Please see [facility] for complete assessment and education provided.    Vital signs stable throughout and prior to discharge. Patient tolerated procedure well and was discharged without incident. Patient is aware of next [facility] appointment on 2024. Appointment card give to the Patient.      Ms. Stephenson's vitals were reviewed prior to and after treatment.   Patient Vitals for the past 24 hrs:   BP Temp Temp src Pulse Resp   24 1106 130/62 -- -- 80 18   24 0928 120/76 97.5 °F (36.4 °C) Temporal 75 18       Medications given:   Medications Administered         (Belapectin/Placebo) investigational 1 each in sodium chloride 0.9 % 100 mL IVPB Admin Date  2024 Action  New Bag Dose   Rate  100 mL/hr Route  IntraVENous Administered By  Yadira Ponce RN        0.9 % sodium chloride infusion Admin Date  2024 Action  New Bag Dose  25 mL/hr Rate  25 mL/hr Route  IntraVENous Administered By  Judith Snell RN            Ms. Stephenson tolerated the infusion, and had no complaints.  Infusion was not delayed or interrupted.     [facility]. Discharge Instructions provided to patient, patient verbalized understanding but denied the request for a copy after visit summary.     Future Appointments   Date Time Provider Department Center   2024 11:00 AM B4 PEDS FASTRACK [facility]   1/15/2024  9:20 AM LIV_[facility]_NURSE_RESEARCH LIVR [facility]   2024  8:00 AM A3 PEDS FASTRACK [facility]   2024  8:20 AM LIV_[facility]_NURSE_RESEARCH LIVR [facility]   2024  9:00 AM B3 PEDS [place] [facility]   2024  8:20 AM LIV_[facility]_NURSE_RESEARCH LIVR [facility]   2024  9:00 AM A3 PEDS FASTRACK [facility]   2024   no nausea/no vomiting/no diarrhea/no constipation/no abdominal pain no nausea/no vomiting/no abdominal pain

## 2024-01-09 ENCOUNTER — HOSPITAL ENCOUNTER (OUTPATIENT)
Facility: HOSPITAL | Age: 64
Setting detail: INFUSION SERIES
Discharge: HOME | End: 2024-01-09
Payer: OTHER GOVERNMENT

## 2024-01-09 VITALS
SYSTOLIC BLOOD PRESSURE: 126 MMHG | DIASTOLIC BLOOD PRESSURE: 80 MMHG | RESPIRATION RATE: 16 BRPM | HEART RATE: 80 BPM | TEMPERATURE: 97.5 F

## 2024-01-09 DIAGNOSIS — K75.81 NASH (NONALCOHOLIC STEATOHEPATITIS): Primary | ICD-10-CM

## 2024-01-09 PROCEDURE — 2580000003 HC RX 258: Performed by: INTERNAL MEDICINE

## 2024-01-09 PROCEDURE — 96365 THER/PROPH/DIAG IV INF INIT: CPT

## 2024-01-09 PROCEDURE — 2500000003 HC RX 250 WO HCPCS: Performed by: INTERNAL MEDICINE

## 2024-01-09 RX ORDER — SODIUM CHLORIDE 0.9 % (FLUSH) 0.9 %
5-40 SYRINGE (ML) INJECTION PRN
Status: DISCONTINUED | OUTPATIENT
Start: 2024-01-09 | End: 2024-01-10 | Stop reason: HOSPADM

## 2024-01-09 RX ORDER — ALBUTEROL SULFATE 90 UG/1
4 AEROSOL, METERED RESPIRATORY (INHALATION) PRN
OUTPATIENT
Start: 2024-01-23

## 2024-01-09 RX ORDER — SODIUM CHLORIDE 9 MG/ML
5-250 INJECTION, SOLUTION INTRAVENOUS PRN
Status: CANCELLED | OUTPATIENT
Start: 2024-01-23

## 2024-01-09 RX ORDER — ONDANSETRON 2 MG/ML
8 INJECTION INTRAMUSCULAR; INTRAVENOUS
OUTPATIENT
Start: 2024-01-23

## 2024-01-09 RX ORDER — ACETAMINOPHEN 325 MG/1
650 TABLET ORAL
OUTPATIENT
Start: 2024-01-23

## 2024-01-09 RX ORDER — SODIUM CHLORIDE 9 MG/ML
5-250 INJECTION, SOLUTION INTRAVENOUS PRN
Status: DISCONTINUED | OUTPATIENT
Start: 2024-01-09 | End: 2024-01-10 | Stop reason: HOSPADM

## 2024-01-09 RX ORDER — SODIUM CHLORIDE 9 MG/ML
INJECTION, SOLUTION INTRAVENOUS CONTINUOUS
OUTPATIENT
Start: 2024-01-23

## 2024-01-09 RX ORDER — SODIUM CHLORIDE 0.9 % (FLUSH) 0.9 %
5-40 SYRINGE (ML) INJECTION PRN
Status: CANCELLED | OUTPATIENT
Start: 2024-01-23

## 2024-01-09 RX ORDER — EPINEPHRINE 1 MG/ML
0.3 INJECTION, SOLUTION INTRAMUSCULAR; SUBCUTANEOUS PRN
OUTPATIENT
Start: 2024-01-23

## 2024-01-09 RX ORDER — DIPHENHYDRAMINE HYDROCHLORIDE 50 MG/ML
50 INJECTION INTRAMUSCULAR; INTRAVENOUS
OUTPATIENT
Start: 2024-01-23

## 2024-01-09 RX ADMIN — SODIUM CHLORIDE, PRESERVATIVE FREE 10 ML: 5 INJECTION INTRAVENOUS at 11:15

## 2024-01-09 RX ADMIN — Medication: at 11:34

## 2024-01-09 RX ADMIN — SODIUM CHLORIDE 30 ML/HR: 9 INJECTION, SOLUTION INTRAVENOUS at 11:17

## 2024-01-09 ASSESSMENT — PAIN SCALES - GENERAL
PAINLEVEL_OUTOF10: 0
PAINLEVEL_OUTOF10: 0

## 2024-01-09 NOTE — PROGRESS NOTES
\[facility]\"" Peds/Adult Note                       Date: 2024    Name: Edda Stephenson    MRN: [id number]         : 1960    1105 Patient arrives for Belapectin/Placebo Research Infusion without acute problems. Please see Epic for complete assessment and education provided. Patient's infusion was not delayed or interrupted.     Vital signs stable throughout and prior to discharge. Patient tolerated procedure well and was discharged without incident.  Patient is aware of next \[facility]\"" appointment on 2024. Appointment card give to the Patient.       Ms. Stephenson's vitals were reviewed prior to and after treatment.   Patient Vitals for the past 12 hrs:   Temp Pulse Resp BP   24 1237 97.5 °F (36.4 °C) 80 16 126/80   24 1105 97.3 °F (36.3 °C) 80 18 139/82       Medications given:   Medications Administered         (Belapectin/Placebo) investigational 1 each in sodium chloride 0.9 % 100 mL IVPB Admin Date  2024 Action  New Bag Dose   Rate  100 mL/hr Route  IntraVENous Administered By  Yadira Ponce RN        0.9 % sodium chloride infusion Admin Date  2024 Action  New Bag Dose  30 mL/hr Rate  30 mL/hr Route  IntraVENous Administered By  Yadira Ponce RN        sodium chloride flush 0.9 % injection 5-40 mL Admin Date  2024 Action  Given Dose  10 mL Rate   Route  IntraVENous Administered By  Yadira Ponce RN              Ms. Stephenson tolerated the infusion, and had no complaints.    Ms. Stephenson [facility] condition.         Yadira Ponce RN  2024  1:20 PM

## 2024-01-12 DIAGNOSIS — Z00.6 EXAMINATION OF PARTICIPANT IN CLINICAL TRIAL: Primary | ICD-10-CM

## 2024-01-15 ENCOUNTER — APPOINTMENT (OUTPATIENT)
Facility: HOSPITAL | Age: 64
End: 2024-01-15
Payer: OTHER GOVERNMENT

## 2024-01-18 ENCOUNTER — HOSPITAL ENCOUNTER (OUTPATIENT)
Facility: HOSPITAL | Age: 64
Setting detail: INFUSION SERIES
Discharge: HOME | End: 2024-01-18
Payer: OTHER GOVERNMENT

## 2024-01-18 VITALS
DIASTOLIC BLOOD PRESSURE: 79 MMHG | TEMPERATURE: 97.8 F | HEART RATE: 83 BPM | RESPIRATION RATE: 16 BRPM | SYSTOLIC BLOOD PRESSURE: 149 MMHG

## 2024-01-18 DIAGNOSIS — K75.81 NASH (NONALCOHOLIC STEATOHEPATITIS): Primary | ICD-10-CM

## 2024-01-18 PROCEDURE — 2500000003 HC RX 250 WO HCPCS: Performed by: INTERNAL MEDICINE

## 2024-01-18 PROCEDURE — 2580000003 HC RX 258: Performed by: INTERNAL MEDICINE

## 2024-01-18 PROCEDURE — 96365 THER/PROPH/DIAG IV INF INIT: CPT

## 2024-01-18 RX ORDER — SODIUM CHLORIDE 9 MG/ML
5-250 INJECTION, SOLUTION INTRAVENOUS PRN
OUTPATIENT
Start: 2024-01-23

## 2024-01-18 RX ORDER — SODIUM CHLORIDE 0.9 % (FLUSH) 0.9 %
5-40 SYRINGE (ML) INJECTION PRN
OUTPATIENT
Start: 2024-01-23

## 2024-01-18 RX ORDER — EPINEPHRINE 1 MG/ML
0.3 INJECTION, SOLUTION INTRAMUSCULAR; SUBCUTANEOUS PRN
OUTPATIENT
Start: 2024-01-23

## 2024-01-18 RX ORDER — ALBUTEROL SULFATE 90 UG/1
4 AEROSOL, METERED RESPIRATORY (INHALATION) PRN
OUTPATIENT
Start: 2024-01-23

## 2024-01-18 RX ORDER — SODIUM CHLORIDE 9 MG/ML
5-250 INJECTION, SOLUTION INTRAVENOUS PRN
Status: DISCONTINUED | OUTPATIENT
Start: 2024-01-18 | End: 2024-01-19 | Stop reason: HOSPADM

## 2024-01-18 RX ORDER — DIPHENHYDRAMINE HYDROCHLORIDE 50 MG/ML
50 INJECTION INTRAMUSCULAR; INTRAVENOUS
OUTPATIENT
Start: 2024-01-23

## 2024-01-18 RX ORDER — ONDANSETRON 2 MG/ML
8 INJECTION INTRAMUSCULAR; INTRAVENOUS
OUTPATIENT
Start: 2024-01-23

## 2024-01-18 RX ORDER — ACETAMINOPHEN 325 MG/1
650 TABLET ORAL
OUTPATIENT
Start: 2024-01-23

## 2024-01-18 RX ORDER — SODIUM CHLORIDE 9 MG/ML
INJECTION, SOLUTION INTRAVENOUS CONTINUOUS
OUTPATIENT
Start: 2024-01-23

## 2024-01-18 RX ORDER — SODIUM CHLORIDE 0.9 % (FLUSH) 0.9 %
5-40 SYRINGE (ML) INJECTION PRN
Status: DISCONTINUED | OUTPATIENT
Start: 2024-01-18 | End: 2024-01-19 | Stop reason: HOSPADM

## 2024-01-18 RX ADMIN — Medication: at 09:55

## 2024-01-18 RX ADMIN — SODIUM CHLORIDE 25 ML/HR: 9 INJECTION, SOLUTION INTRAVENOUS at 09:30

## 2024-01-18 ASSESSMENT — PAIN SCALES - GENERAL: PAINLEVEL_OUTOF10: 0

## 2024-01-18 NOTE — PROGRESS NOTES
[facility] Peds/Adult Note                       Date: 2024    Name: Edda Stephenson    MRN: [id number]         : 1960    0900 Patient arrives for Belapectin/placebo clinical trial without acute problems. Please see [facility] for complete assessment and education provided.    Vital signs stable throughout and prior to discharge. Patient tolerated procedure well and was discharged without incident. Patient is aware of next [facility] appointment on 2024. Appointment card give to the Patient.      Ms. Stephenson's vitals were reviewed prior to and after treatment.   Patient Vitals for the past 24 hrs:   BP Temp Temp src Pulse Resp   24 1058 (!) 149/79 -- -- 83 16   24 0915 (!) 146/73 97.8 °F (36.6 °C) Temporal 76 18       Medications given:   Medications Administered         (Belapectin/Placebo) investigational 1 each in sodium chloride 0.9 % 100 mL IVPB Admin Date  2024 Action  New Bag Dose   Rate  100 mL/hr Route  IntraVENous Administered By  Judith Snell, RN        0.9 % sodium chloride infusion Admin Date  2024 Action  New Bag Dose  25 mL/hr Rate  25 mL/hr Route  IntraVENous Administered By  Judith Snell, RN            Ms. Stephenson tolerated the infusion, and had no complaints.  Infusion was not delayed or interrupted.     Ms. Stephenson [facility] condition. Discharge Instructions provided to patient, patient verbalized understanding but denied the request for a copy after visit summary.     Future Appointments   Date Time Provider Department Center   2024  8:20 AM LIV_[facility]_NURSE_RESEARCH LIVR BS AMB   2024  9:00 AM B3 PEDS LONG [place] [facility]OP[facility]   2024  8:20 AM LIV_[facility]_NURSE_RESEARCH LIVR BS AMB   2024  9:00 AM A3 PEDS FASTRACK [facility]   2024 11:00 AM [facility] US OP 2 SMHRUS [facility]   2024  2:00 PM Chase Degroot MD LIVR BS AMB   2024  8:00 AM Blanche Guallpa APRN - NP LIVR BS AMB

## 2024-01-29 ENCOUNTER — NURSE ONLY (OUTPATIENT)
Age: 64
End: 2024-01-29

## 2024-01-29 ENCOUNTER — HOSPITAL ENCOUNTER (OUTPATIENT)
Facility: HOSPITAL | Age: 64
Setting detail: INFUSION SERIES
Discharge: HOME | End: 2024-01-29
Payer: OTHER GOVERNMENT

## 2024-01-29 VITALS
SYSTOLIC BLOOD PRESSURE: 127 MMHG | HEART RATE: 70 BPM | DIASTOLIC BLOOD PRESSURE: 75 MMHG | RESPIRATION RATE: 18 BRPM | TEMPERATURE: 97.6 F

## 2024-01-29 DIAGNOSIS — K75.81 NASH (NONALCOHOLIC STEATOHEPATITIS): Primary | ICD-10-CM

## 2024-01-29 DIAGNOSIS — Z00.6 RESEARCH EXAM: Primary | ICD-10-CM

## 2024-01-29 PROCEDURE — 2580000003 HC RX 258: Performed by: INTERNAL MEDICINE

## 2024-01-29 PROCEDURE — 2500000003 HC RX 250 WO HCPCS: Performed by: INTERNAL MEDICINE

## 2024-01-29 PROCEDURE — 96365 THER/PROPH/DIAG IV INF INIT: CPT

## 2024-01-29 RX ORDER — ALBUTEROL SULFATE 90 UG/1
4 AEROSOL, METERED RESPIRATORY (INHALATION) PRN
OUTPATIENT
Start: 2024-01-30

## 2024-01-29 RX ORDER — SODIUM CHLORIDE 0.9 % (FLUSH) 0.9 %
5-40 SYRINGE (ML) INJECTION PRN
Status: DISCONTINUED | OUTPATIENT
Start: 2024-01-29 | End: 2024-01-30 | Stop reason: HOSPADM

## 2024-01-29 RX ORDER — SODIUM CHLORIDE 0.9 % (FLUSH) 0.9 %
5-40 SYRINGE (ML) INJECTION PRN
OUTPATIENT
Start: 2024-01-30

## 2024-01-29 RX ORDER — SODIUM CHLORIDE 9 MG/ML
5-250 INJECTION, SOLUTION INTRAVENOUS PRN
OUTPATIENT
Start: 2024-01-30

## 2024-01-29 RX ORDER — ACETAMINOPHEN 325 MG/1
650 TABLET ORAL
OUTPATIENT
Start: 2024-01-30

## 2024-01-29 RX ORDER — ONDANSETRON 2 MG/ML
8 INJECTION INTRAMUSCULAR; INTRAVENOUS
OUTPATIENT
Start: 2024-01-30

## 2024-01-29 RX ORDER — SODIUM CHLORIDE 9 MG/ML
5-250 INJECTION, SOLUTION INTRAVENOUS PRN
Status: DISCONTINUED | OUTPATIENT
Start: 2024-01-29 | End: 2024-01-30 | Stop reason: HOSPADM

## 2024-01-29 RX ORDER — SODIUM CHLORIDE 9 MG/ML
INJECTION, SOLUTION INTRAVENOUS CONTINUOUS
OUTPATIENT
Start: 2024-01-30

## 2024-01-29 RX ORDER — DIPHENHYDRAMINE HYDROCHLORIDE 50 MG/ML
50 INJECTION INTRAMUSCULAR; INTRAVENOUS
OUTPATIENT
Start: 2024-01-30

## 2024-01-29 RX ORDER — EPINEPHRINE 1 MG/ML
0.3 INJECTION, SOLUTION INTRAMUSCULAR; SUBCUTANEOUS PRN
OUTPATIENT
Start: 2024-01-30

## 2024-01-29 RX ADMIN — SODIUM CHLORIDE 25 ML/HR: 9 INJECTION, SOLUTION INTRAVENOUS at 08:41

## 2024-01-29 RX ADMIN — Medication: at 09:28

## 2024-01-29 NOTE — PROGRESS NOTES
[facility] Peds/Adult Note                   Date: 2024    Name: Edda Stephenson    MRN: [id number]       : 196030 Patient arrives for Belapectin/placebo clinical trial without acute problems. Please see Epic for complete assessment and education provided.    Vital signs stable throughout and prior to discharge. Patient tolerated procedure well and was discharged without incident.  Patient is aware of next [facility] appointment on 2024. Appointment card give to the Patient.      Ms. Stephenson's vitals were reviewed prior to and after treatment.   Patient Vitals for the past 12 hrs:   Temp Pulse Resp BP   24 1029 -- 70 18 127/75   24 0830 97.6 °F (36.4 °C) 75 18 130/77     Medications given:   Medications Administered         (Belapectin/Placebo) investigational 1 each in sodium chloride 0.9 % 100 mL IVPB Admin Date  2024 Action  New Bag Dose   Rate  100 mL/hr Route  IntraVENous Administered By  María Daley RN        0.9 % sodium chloride infusion Admin Date  2024 Action  New Bag Dose  25 mL/hr Rate  25 mL/hr Route  IntraVENous Administered By  María Daley RN              Ms. Stephenson tolerated the infusion, and had no complaints.    [facility]. Stephen[facility] condition.     Future Appointments   Date Time Provider Department Center   2024  8:20 AM LIV_[facility]_NURSE_RESEARCH LIVR [facility]   2024  9:00 AM A3 PEDS FASTRACK RCHPOPIC [facility]   2024 11:00 AM [facility] US OP 2 SMHRUS [facility]   2024  2:00 PM Chase Degroot MD LIVR BS [facility]   2024  8:00 AM Blanche Guallpa APRN - NP LIVR BS [facility]   2024  8:15 AM Blanche Guallpa APRN - NP LIVR BS [facility]   2024  9:00 AM B4 PEDS FASTRACK RCHPOPIC [facility]   3/11/2024  8:00 AM LIV_[facility]_NURSE_RESEARCH MOHSEN BS [facility]   3/11/2024  9:00 AM B3 PEDS LONG TX RCHPOPIC [facility]   3/25/2024  8:20 AM LIV_[facility]_NURSE_RESEARCH LIVR BS AMB   3/25/2024  9:00 AM A1 PEDS MED TX RCHPOPIC [facility]    4/8/2024  8:20 AM [facility]_NURSE_RESEARCH LIVR [facility]   4/8/2024  9:00 AM B4 PEDS FASTRACK [facility]POPIC [facility]   4/22/2024  8:20 AM LIV_[facility]_NURSE_RESEARCH LIVR [facility]   4/22/2024  9:00 AM B4 PEDS FASTRACK [facility]   5/6/2024  8:20 AM LIV_[facility]_NURSE_RESEARCH LIVR [facility]   5/6/2024  9:00 AM B4 PEDS FASTRACK [facility]   5/20/2024  8:20 AM Bell Paez, APRN - NP LIVR BS [facility]   5/20/2024  9:00 AM A1 PEDS MED TX [facility]CHPOPIC [facility]   6/3/2024  8:00 AM Bell Paez, APRN - NP LIVR BS [facility]   6/3/2024  9:00 AM B4 PEDS FASTRACK [facility]   6/17/2024  8:20 AM [facility]_NURSE_RESEARCH LIVR [facility]   6/17/2024  9:00 AM B4 PEDS FASTRACK [facility]POPIC [facility]       LOLLY RODRIGUEZ RN  January 29, 2024  12:40 PM

## 2024-02-08 ENCOUNTER — PREP FOR PROCEDURE (OUTPATIENT)
Age: 64
End: 2024-02-08

## 2024-02-08 DIAGNOSIS — Z00.6 EXAMINATION OF PARTICIPANT IN CLINICAL TRIAL: ICD-10-CM

## 2024-02-12 ENCOUNTER — HOSPITAL ENCOUNTER (OUTPATIENT)
Facility: HOSPITAL | Age: 64
Discharge: HOME | End: 2024-02-15

## 2024-02-12 ENCOUNTER — TRANSCRIBE ORDERS (OUTPATIENT)
Facility: HOSPITAL | Age: 64
End: 2024-02-12

## 2024-02-12 ENCOUNTER — OFFICE VISIT (OUTPATIENT)
Age: 64
End: 2024-02-12

## 2024-02-12 ENCOUNTER — HOSPITAL ENCOUNTER (OUTPATIENT)
Facility: HOSPITAL | Age: 64
Setting detail: INFUSION SERIES
Discharge: HOME | End: 2024-02-12
Payer: OTHER GOVERNMENT

## 2024-02-12 VITALS
TEMPERATURE: 97.6 F | DIASTOLIC BLOOD PRESSURE: 74 MMHG | SYSTOLIC BLOOD PRESSURE: 135 MMHG | HEART RATE: 74 BPM | RESPIRATION RATE: 18 BRPM

## 2024-02-12 DIAGNOSIS — Z00.6 RESEARCH EXAM: Primary | ICD-10-CM

## 2024-02-12 DIAGNOSIS — Z00.6 EXAMINATION OF PARTICIPANT IN CLINICAL TRIAL: ICD-10-CM

## 2024-02-12 DIAGNOSIS — K74.69 OTHER CIRRHOSIS OF LIVER (HCC): ICD-10-CM

## 2024-02-12 DIAGNOSIS — Z00.6 EXAMINATION OF PARTICIPANT IN CLINICAL TRIAL: Primary | ICD-10-CM

## 2024-02-12 DIAGNOSIS — K75.81 NASH (NONALCOHOLIC STEATOHEPATITIS): Primary | ICD-10-CM

## 2024-02-12 LAB
EKG ATRIAL RATE: 80 BPM
EKG DIAGNOSIS: NORMAL
EKG P AXIS: 71 DEGREES
EKG P-R INTERVAL: 156 MS
EKG Q-T INTERVAL: 406 MS
EKG QRS DURATION: 78 MS
EKG QTC CALCULATION (BAZETT): 468 MS
EKG R AXIS: 11 DEGREES
EKG T AXIS: 26 DEGREES
EKG VENTRICULAR RATE: 80 BPM

## 2024-02-12 PROCEDURE — 2500000003 HC RX 250 WO HCPCS: Performed by: INTERNAL MEDICINE

## 2024-02-12 PROCEDURE — 93005 ELECTROCARDIOGRAM TRACING: CPT

## 2024-02-12 PROCEDURE — 99214 OFFICE O/P EST MOD 30 MIN: CPT | Performed by: NURSE PRACTITIONER

## 2024-02-12 PROCEDURE — 91200 LIVER ELASTOGRAPHY: CPT | Performed by: NURSE PRACTITIONER

## 2024-02-12 PROCEDURE — 2580000003 HC RX 258: Performed by: INTERNAL MEDICINE

## 2024-02-12 PROCEDURE — 96365 THER/PROPH/DIAG IV INF INIT: CPT

## 2024-02-12 PROCEDURE — 76700 US EXAM ABDOM COMPLETE: CPT

## 2024-02-12 RX ORDER — ONDANSETRON 2 MG/ML
8 INJECTION INTRAMUSCULAR; INTRAVENOUS
OUTPATIENT
Start: 2024-02-26

## 2024-02-12 RX ORDER — SODIUM CHLORIDE 0.9 % (FLUSH) 0.9 %
5-40 SYRINGE (ML) INJECTION PRN
OUTPATIENT
Start: 2024-02-26

## 2024-02-12 RX ORDER — MELOXICAM 15 MG/1
15 TABLET ORAL DAILY
COMMUNITY

## 2024-02-12 RX ORDER — SODIUM CHLORIDE 0.9 % (FLUSH) 0.9 %
5-40 SYRINGE (ML) INJECTION PRN
Status: DISCONTINUED | OUTPATIENT
Start: 2024-02-12 | End: 2024-02-13 | Stop reason: HOSPADM

## 2024-02-12 RX ORDER — SODIUM CHLORIDE 9 MG/ML
INJECTION, SOLUTION INTRAVENOUS CONTINUOUS
OUTPATIENT
Start: 2024-02-26

## 2024-02-12 RX ORDER — SODIUM CHLORIDE 9 MG/ML
5-250 INJECTION, SOLUTION INTRAVENOUS PRN
Status: DISCONTINUED | OUTPATIENT
Start: 2024-02-12 | End: 2024-02-13 | Stop reason: HOSPADM

## 2024-02-12 RX ORDER — EPINEPHRINE 1 MG/ML
0.3 INJECTION, SOLUTION INTRAMUSCULAR; SUBCUTANEOUS PRN
OUTPATIENT
Start: 2024-02-26

## 2024-02-12 RX ORDER — ACETAMINOPHEN 325 MG/1
650 TABLET ORAL
OUTPATIENT
Start: 2024-02-26

## 2024-02-12 RX ORDER — ALBUTEROL SULFATE 90 UG/1
4 AEROSOL, METERED RESPIRATORY (INHALATION) PRN
OUTPATIENT
Start: 2024-02-26

## 2024-02-12 RX ORDER — SODIUM CHLORIDE 9 MG/ML
5-250 INJECTION, SOLUTION INTRAVENOUS PRN
OUTPATIENT
Start: 2024-02-26

## 2024-02-12 RX ORDER — DIPHENHYDRAMINE HYDROCHLORIDE 50 MG/ML
50 INJECTION INTRAMUSCULAR; INTRAVENOUS
OUTPATIENT
Start: 2024-02-26

## 2024-02-12 RX ADMIN — Medication: at 09:57

## 2024-02-12 RX ADMIN — SODIUM CHLORIDE 30 ML/HR: 9 INJECTION, SOLUTION INTRAVENOUS at 09:23

## 2024-02-12 RX ADMIN — SODIUM CHLORIDE, PRESERVATIVE FREE 10 ML: 5 INJECTION INTRAVENOUS at 09:21

## 2024-02-12 ASSESSMENT — PAIN SCALES - GENERAL
PAINLEVEL_OUTOF10: 0
PAINLEVEL_OUTOF10: 0

## 2024-02-12 NOTE — PROGRESS NOTES
[facility]      Chase Degroot MD, FACP, FACG, FAASLD      CLEO Draper, [facility]   Blanche Guallpa, [facility]   Bell Paez, FNP-C   Carlos Alba, FNP-C    Beverly Desir, [facility]    at [facility]    [address], [address]    [place]  [address] [phone]    FAX: [phone]   [facility]    at [facility]    [address], [address]    [place]  [address] [phone]    FAX: [phone]       HEPATOLOGY CLINICAL RESEARCH NOTE    Edda Stephenosn is a 63 y.o. female with ZAFAR cirrhosis.    The patient was seen today for infusion #40 visit for the [facility] clinical trial for patients with ZAFAR cirrhosis. The study is an infused therapy of Belapectin (GR-MD-02) versus placebo given every 2 weeks and evaluating is effects on fibrosis.      Today's visit was conducted per the study protocol but was scheduled.    She was seen in ortho office with concern for localized infection at the site of the incision of the right knee replacement.  She was recently admitted to the hospital for I&D and wound vac/flush and given PICC line for bid administration of outpatient cefazolin.  She has been tolerating this reasonably well at home and is presently without fevers, chills, abdominal pain.  Her stools have normalized and she is feeling well. No confusion and no fluid overload. She has had some minimal swelling of the RLE post-op, this is unchanged.     The patient was asked if any symptoms, side effects or adverse events have occurred since the last study visit.     A physical examination was performed per protocol.  Incision with clean lines and 2 small scabs at bottom of incision of right knee.  Trace RLE edema.     All symptoms reported by the patient and the findings

## 2024-02-12 NOTE — PROGRESS NOTES
[facility] Peds/Adult Note                       Date: 2024    Name: Edda Stephenson    MRN: [id number]         : 1960    0920 Patient arrives for Belapectin/Placebo [facility] without acute problems. Please see Epic for complete assessment and education provided. Patient's infusion was not delayed or interrupted.     Vital signs stable throughout and prior to discharge. Patient tolerated procedure well and was discharged without incident.  Patient is aware of next [facility] appointment on 2024.  Appointment card give to the Patient.       Ms. Stephenson's vitals were reviewed prior to and after treatment.   Patient Vitals for the past 12 hrs:   Temp Pulse Resp BP   24 1100 -- 74 18 135/74   24 0920 97.6 °F (36.4 °C) 78 18 (!) 145/73         Medications given:   Medications Administered         (Belapectin/Placebo) investigational 1 each in sodium chloride 0.9 % 100 mL IVPB Admin Date  2024 Action  New Bag Dose   Rate  100 mL/hr Route  IntraVENous Administered By  María Daley RN        0.9 % sodium chloride infusion Admin Date  2024 Action  New Bag Dose  30 mL/hr Rate  30 mL/hr Route  IntraVENous Administered By  Yadira Ponce RN        sodium chloride flush 0.9 % injection 5-40 mL Admin Date  2024 Action  Given Dose  10 mL Rate   Route  IntraVENous Administered By  Yadira Ponce RN          Ms. Stephenson tolerated the infusion, and had no complaints.    Ms. Stephenson was discharged from [facility] in stable condition.     Yadira Ponce RN  2024  11:22 AM

## 2024-02-14 LAB
AFP L3 MFR SERPL: 7.9 % (ref 0–9.9)
AFP SERPL-MCNC: 8.8 NG/ML (ref 0–9.2)

## 2024-02-22 ENCOUNTER — HOSPITAL ENCOUNTER (OUTPATIENT)
Facility: HOSPITAL | Age: 64
Setting detail: OUTPATIENT SURGERY
Discharge: HOME | End: 2024-02-22
Attending: INTERNAL MEDICINE | Admitting: INTERNAL MEDICINE
Payer: OTHER GOVERNMENT

## 2024-02-22 ENCOUNTER — ANESTHESIA (OUTPATIENT)
Facility: HOSPITAL | Age: 64
End: 2024-02-22
Payer: OTHER GOVERNMENT

## 2024-02-22 ENCOUNTER — ANESTHESIA EVENT (OUTPATIENT)
Facility: HOSPITAL | Age: 64
End: 2024-02-22
Payer: OTHER GOVERNMENT

## 2024-02-22 VITALS
WEIGHT: 176 LBS | DIASTOLIC BLOOD PRESSURE: 77 MMHG | SYSTOLIC BLOOD PRESSURE: 124 MMHG | OXYGEN SATURATION: 98 % | BODY MASS INDEX: 26.67 KG/M2 | HEIGHT: 68 IN | RESPIRATION RATE: 15 BRPM | TEMPERATURE: 97.1 F | HEART RATE: 72 BPM

## 2024-02-22 PROBLEM — K76.6 PORTAL HYPERTENSIVE GASTROPATHY (HCC): Status: ACTIVE | Noted: 2024-02-22

## 2024-02-22 PROBLEM — K31.89 PORTAL HYPERTENSIVE GASTROPATHY (HCC): Status: ACTIVE | Noted: 2024-02-22

## 2024-02-22 PROBLEM — I85.10 SECONDARY ESOPHAGEAL VARICES WITHOUT BLEEDING (HCC): Status: ACTIVE | Noted: 2024-02-22

## 2024-02-22 PROCEDURE — 2580000003 HC RX 258: Performed by: STUDENT IN AN ORGANIZED HEALTH CARE EDUCATION/TRAINING PROGRAM

## 2024-02-22 PROCEDURE — 7100000011 HC PHASE II RECOVERY - ADDTL 15 MIN: Performed by: INTERNAL MEDICINE

## 2024-02-22 PROCEDURE — 3600007502: Performed by: INTERNAL MEDICINE

## 2024-02-22 PROCEDURE — 3600007512: Performed by: INTERNAL MEDICINE

## 2024-02-22 PROCEDURE — 7100000010 HC PHASE II RECOVERY - FIRST 15 MIN: Performed by: INTERNAL MEDICINE

## 2024-02-22 PROCEDURE — 3700000001 HC ADD 15 MINUTES (ANESTHESIA): Performed by: INTERNAL MEDICINE

## 2024-02-22 PROCEDURE — 2500000003 HC RX 250 WO HCPCS: Performed by: STUDENT IN AN ORGANIZED HEALTH CARE EDUCATION/TRAINING PROGRAM

## 2024-02-22 PROCEDURE — 2709999900 HC NON-CHARGEABLE SUPPLY: Performed by: INTERNAL MEDICINE

## 2024-02-22 PROCEDURE — 6360000002 HC RX W HCPCS: Performed by: STUDENT IN AN ORGANIZED HEALTH CARE EDUCATION/TRAINING PROGRAM

## 2024-02-22 PROCEDURE — 3700000000 HC ANESTHESIA ATTENDED CARE: Performed by: INTERNAL MEDICINE

## 2024-02-22 RX ORDER — FENTANYL CITRATE 50 UG/ML
25 INJECTION, SOLUTION INTRAMUSCULAR; INTRAVENOUS AS NEEDED
Status: DISCONTINUED | OUTPATIENT
Start: 2024-02-22 | End: 2024-02-22 | Stop reason: HOSPADM

## 2024-02-22 RX ORDER — PANTOPRAZOLE SODIUM 40 MG/1
40 TABLET, DELAYED RELEASE ORAL
Qty: 180 TABLET | Refills: 3 | Status: SHIPPED | OUTPATIENT
Start: 2024-02-22

## 2024-02-22 RX ORDER — ONDANSETRON 2 MG/ML
2 INJECTION INTRAMUSCULAR; INTRAVENOUS AS NEEDED
Status: DISCONTINUED | OUTPATIENT
Start: 2024-02-22 | End: 2024-02-22 | Stop reason: HOSPADM

## 2024-02-22 RX ORDER — SODIUM CHLORIDE 0.9 % (FLUSH) 0.9 %
5-40 SYRINGE (ML) INJECTION EVERY 12 HOURS SCHEDULED
Status: DISCONTINUED | OUTPATIENT
Start: 2024-02-22 | End: 2024-02-22 | Stop reason: HOSPADM

## 2024-02-22 RX ORDER — LIDOCAINE HYDROCHLORIDE 20 MG/ML
INJECTION, SOLUTION EPIDURAL; INFILTRATION; INTRACAUDAL; PERINEURAL PRN
Status: DISCONTINUED | OUTPATIENT
Start: 2024-02-22 | End: 2024-02-22 | Stop reason: SDUPTHER

## 2024-02-22 RX ORDER — SODIUM CHLORIDE, SODIUM LACTATE, POTASSIUM CHLORIDE, CALCIUM CHLORIDE 600; 310; 30; 20 MG/100ML; MG/100ML; MG/100ML; MG/100ML
INJECTION, SOLUTION INTRAVENOUS CONTINUOUS PRN
Status: DISCONTINUED | OUTPATIENT
Start: 2024-02-22 | End: 2024-02-22 | Stop reason: SDUPTHER

## 2024-02-22 RX ORDER — SODIUM CHLORIDE 0.9 % (FLUSH) 0.9 %
5-40 SYRINGE (ML) INJECTION PRN
Status: DISCONTINUED | OUTPATIENT
Start: 2024-02-22 | End: 2024-02-22 | Stop reason: HOSPADM

## 2024-02-22 RX ORDER — SODIUM CHLORIDE 9 MG/ML
INJECTION, SOLUTION INTRAVENOUS PRN
Status: DISCONTINUED | OUTPATIENT
Start: 2024-02-22 | End: 2024-02-22 | Stop reason: HOSPADM

## 2024-02-22 RX ADMIN — PROPOFOL 100 MG: 10 INJECTION, EMULSION INTRAVENOUS at 14:12

## 2024-02-22 RX ADMIN — PROPOFOL 100 MG: 10 INJECTION, EMULSION INTRAVENOUS at 14:14

## 2024-02-22 RX ADMIN — LIDOCAINE HYDROCHLORIDE 60 MG: 20 INJECTION, SOLUTION EPIDURAL; INFILTRATION; INTRACAUDAL; PERINEURAL at 14:12

## 2024-02-22 RX ADMIN — PROPOFOL 50 MG: 10 INJECTION, EMULSION INTRAVENOUS at 14:17

## 2024-02-22 RX ADMIN — SODIUM CHLORIDE, POTASSIUM CHLORIDE, SODIUM LACTATE AND CALCIUM CHLORIDE: 600; 310; 30; 20 INJECTION, SOLUTION INTRAVENOUS at 14:09

## 2024-02-22 ASSESSMENT — PAIN - FUNCTIONAL ASSESSMENT: PAIN_FUNCTIONAL_ASSESSMENT: NONE - DENIES PAIN

## 2024-02-22 NOTE — DISCHARGE INSTRUCTIONS
[facility]      Chase Degroot MD, FACP, FACG, FAASLD      Azalia Shetty, CLEO Romeo, [facility]   Blanche Zieglerabimaelandrea, [facility]   Bell Philippe, [facility]  Carlos Alba, [facility]   Beverly Desir, [facility]   Purvi Jernigan, [facility]   [address], [address]   [place]  [address] [phone]   FAX: [phone]  [facility]   [address], [address]   [place]  [address] [phone]   FAX: [phone]         ENDOSCOPY DISCHARGE INSTRUCTIONS      Edda Stephenson  1960  Date: 2/22/2024    DISCOMFORT:  Use lozenges or warm salt water gargle for sore thoat  Apply warm compress to IV site if red.  If redness or soreness persists call the office.  You may experience gas and bloating.  Walking and belching will help relieve this.  You may experience chest discomfort or pressure especially if banding of esophageal varices was performed.    DIET:  You may resume your normal diet.    ACTIVITY:  Spend the remainder of the day resting.  Avoid any strenuous activity.  You may not operate a vehicle for 12 hours.  You may not engage in an occupation involving machinery or appliances for rest of today.   Avoid making any critical or financial decisions for at least 24 hour.    Call the [facility] Office if you have any of the following:  Increasing chest or abdominal pain, nausea, vomiting, vomiting blood, abdominal distension or swelling, fever or chills, bloody discharge from nose or mouth or shortness of breath.    Follow-up Instructions:  Call Dr. Degroot for any questions or problems at the phone number listed above.  If a biopsy was performed, you will be contacted by the office staff or Dr Degroot within 1 week.   If you have not heard from us by then you may call the

## 2024-02-22 NOTE — OP NOTE
performed.    Stomach:   Mild portal hypertensive gastropathy of the body of the stomach  3 gastric ulcers in antrum.  1 of left side and 2 on right side of pylorus.  Clean bases.  No Visible Vessel.    No gastric varices identified.    Duodenum:   Normal bulb and second portion    SPECIMENS COLLECTED:   None    INTERVENTIONS:  None    COMPLICATIONS: None.  The patient tolerated the procedure well.    EBL: Negligible.           RECOMMENDATIONS:  Observe until discharge parameters are achieved.  May resume previous diet.  Repeat endoscopy to reassess varices and need for banding in 1 year.  Follow-up [facility] office as scheduled.      Chase Degroot MD  [address], [address]  [place]  [address] [phone]  [facility]

## 2024-02-22 NOTE — ANESTHESIA POSTPROCEDURE EVALUATION
Department of Anesthesiology  Postprocedure Note    Patient: Edda Stephenson  MRN: [id number]  YOB: 1960  Date of evaluation: 2/22/2024    Procedure Summary       Date: 02/22/24 Room / Location: [facility] ENDO 02 / [facility] ENDOSCOPY    Anesthesia Start: 1409 Anesthesia Stop: 1423    Procedure: EGD (Upper GI Region) Diagnosis:       Examination of participant in clinical trial      (Examination of participant in clinical trial [Z00.6])    Surgeons: Chase Degroot MD Responsible Provider: Jose Manuel Vogel MD    Anesthesia Type: MAC ASA Status: 3            Anesthesia Type: MAC    Sebastián Phase I: Sebastián Score: 10    Sebastián Phase II:      Anesthesia Post Evaluation    Patient location during evaluation: bedside  Patient participation: complete - patient participated  Level of consciousness: awake  Airway patency: patent  Nausea & Vomiting: no nausea  Cardiovascular status: blood pressure returned to baseline  Respiratory status: acceptable  Hydration status: euvolemic  Pain management: adequate    No notable events documented.

## 2024-02-22 NOTE — H&P
[facility]      Chase Degroot MD, FACP, FACG, FAASLD      CLEO Draper, [facility]   Blanche Zieglerstevie, [facility]   Bell Paez, [facility]  Carlos Alba, [facility]   Beverly Desir, [facility]   Purvi Russellon, [facility]   [address], [address]   [place]  [address] [phone]   FAX: [phone]  [facility]   [address]   [place]  [address] [phone]   FAX: [phone]         PRE-PROCEDURE NOTE - EGD    H and P from last office visit reviewed.    Allergies reviewed.  Out-patient medicaton list reviewed.    Patient Active Problem List   Diagnosis    ZAFAR (nonalcoholic steatohepatitis)    H/O arthroscopic knee surgery    H/O cervical spine surgery    Examination of participant in clinical trial         Allergies   Allergen Reactions    Codeine Nausea And Vomiting and Other (See Comments)     Other reaction(s): GI Intolerance      Sulfacetamide Sodium-Sulfur Hives    Adhesive Tape Other (See Comments)     bandaids - causes a rash/states she prefers tape    Sulfa Antibiotics Rash and Other (See Comments)       No current facility-administered medications on file prior to encounter.     Current Outpatient Medications on File Prior to Encounter   Medication Sig Dispense Refill    aspirin 81 MG EC tablet Take 1 tablet by mouth daily      metroNIDAZOLE (METROCREAM) 0.75 % cream APPLY TOPICALLY TO FACE EVERY DAY         For EGD to assess for esophageal and gastric varices.  Plan to perform banding if indicated based upon variceal size and appearance.    PHYSICAL EXAMINATION:  Vital signs per nursing and anesthesia records      General: No acute distress.   Eyes: Sclera anicteric.   ENT: No oral lesions.  Thyroid normal.  Nodes: No adenopathy.   Skin: No spider

## 2024-02-22 NOTE — ANESTHESIA PRE PROCEDURE
GFRAA >60 04/25/2022 09:54 AM    AGRATIO 0.9 04/25/2022 09:54 AM    GLUCOSE 112 04/25/2022 09:54 AM    PROT 7.9 04/25/2022 09:54 AM    CALCIUM 8.9 04/25/2022 09:54 AM    BILITOT 1.4 04/25/2022 09:54 AM    ALKPHOS 143 04/25/2022 09:54 AM    AST 82 04/25/2022 09:54 AM    ALT 71 04/25/2022 09:54 AM       POC Tests: No results for input(s): \"POCGLU\", \"POCNA\", \"POCK\", \"POCCL\", \"POCBUN\", \"POCHEMO\", \"POCHCT\" in the last 72 hours.    Coags:   Lab Results   Component Value Date/Time    PROTIME 12.3 04/25/2022 09:54 AM    INR 1.2 04/25/2022 09:54 AM       HCG (If Applicable): No results found for: \"PREGTESTUR\", \"PREGSERUM\", \"HCG\", \"HCGQUANT\"     ABGs: No results found for: \"PHART\", \"PO2ART\", \"QAZ5BJR\", \"VZN7QQI\", \"BEART\", \"V6ZPXBAM\"     Type & Screen (If Applicable):  No results found for: \"LABABO\", \"LABRH\"    Drug/Infectious Status (If Applicable):  No results found for: \"HIV\", \"HEPCAB\"    COVID-19 Screening (If Applicable):   Lab Results   Component Value Date/Time    COVID19 Not detected 02/10/2022 02:08 PM           Anesthesia Evaluation    Airway: Mallampati: II          Dental:          Pulmonary: breath sounds clear to auscultation                             Cardiovascular:            Rhythm: regular  Rate: normal                    Neuro/Psych:               GI/Hepatic/Renal:   (+) liver disease:          Endo/Other:                     Abdominal:             Vascular:          Other Findings:       Anesthesia Plan      MAC     ASA 3             Anesthetic plan and risks discussed with patient.                    Jose Manuel Vogel MD   2/22/2024

## 2024-02-26 ENCOUNTER — OFFICE VISIT (OUTPATIENT)
Age: 64
End: 2024-02-26

## 2024-02-26 ENCOUNTER — HOSPITAL ENCOUNTER (OUTPATIENT)
Facility: HOSPITAL | Age: 64
Setting detail: INFUSION SERIES
Discharge: HOME | End: 2024-02-26
Payer: OTHER GOVERNMENT

## 2024-02-26 VITALS
RESPIRATION RATE: 16 BRPM | TEMPERATURE: 97.5 F | SYSTOLIC BLOOD PRESSURE: 161 MMHG | HEART RATE: 85 BPM | DIASTOLIC BLOOD PRESSURE: 82 MMHG

## 2024-02-26 DIAGNOSIS — Z00.6 RESEARCH EXAM: Primary | ICD-10-CM

## 2024-02-26 DIAGNOSIS — K75.81 NASH (NONALCOHOLIC STEATOHEPATITIS): Primary | ICD-10-CM

## 2024-02-26 PROCEDURE — 2580000003 HC RX 258: Performed by: INTERNAL MEDICINE

## 2024-02-26 PROCEDURE — 99214 OFFICE O/P EST MOD 30 MIN: CPT | Performed by: NURSE PRACTITIONER

## 2024-02-26 PROCEDURE — 96365 THER/PROPH/DIAG IV INF INIT: CPT

## 2024-02-26 PROCEDURE — 2500000003 HC RX 250 WO HCPCS: Performed by: INTERNAL MEDICINE

## 2024-02-26 RX ORDER — DIPHENHYDRAMINE HYDROCHLORIDE 50 MG/ML
50 INJECTION INTRAMUSCULAR; INTRAVENOUS
OUTPATIENT
Start: 2024-03-11

## 2024-02-26 RX ORDER — SODIUM CHLORIDE 9 MG/ML
5-250 INJECTION, SOLUTION INTRAVENOUS PRN
OUTPATIENT
Start: 2024-03-11

## 2024-02-26 RX ORDER — SODIUM CHLORIDE 9 MG/ML
5-250 INJECTION, SOLUTION INTRAVENOUS PRN
Status: DISCONTINUED | OUTPATIENT
Start: 2024-02-26 | End: 2024-02-27 | Stop reason: HOSPADM

## 2024-02-26 RX ORDER — ALBUTEROL SULFATE 90 UG/1
4 AEROSOL, METERED RESPIRATORY (INHALATION) PRN
OUTPATIENT
Start: 2024-03-11

## 2024-02-26 RX ORDER — ACETAMINOPHEN 325 MG/1
650 TABLET ORAL
OUTPATIENT
Start: 2024-03-11

## 2024-02-26 RX ORDER — ONDANSETRON 2 MG/ML
8 INJECTION INTRAMUSCULAR; INTRAVENOUS
OUTPATIENT
Start: 2024-03-11

## 2024-02-26 RX ORDER — SODIUM CHLORIDE 9 MG/ML
INJECTION, SOLUTION INTRAVENOUS CONTINUOUS
OUTPATIENT
Start: 2024-03-11

## 2024-02-26 RX ORDER — EPINEPHRINE 1 MG/ML
0.3 INJECTION, SOLUTION INTRAMUSCULAR; SUBCUTANEOUS PRN
OUTPATIENT
Start: 2024-03-11

## 2024-02-26 RX ORDER — SODIUM CHLORIDE 0.9 % (FLUSH) 0.9 %
5-40 SYRINGE (ML) INJECTION PRN
OUTPATIENT
Start: 2024-03-11

## 2024-02-26 RX ADMIN — SODIUM CHLORIDE 25 ML/HR: 9 INJECTION, SOLUTION INTRAVENOUS at 10:02

## 2024-02-26 RX ADMIN — Medication: at 10:47

## 2024-02-26 ASSESSMENT — PAIN SCALES - GENERAL: PAINLEVEL_OUTOF10: 0

## 2024-02-26 NOTE — PROGRESS NOTES
[facility] Peds/Adult Note                       Date: 2024    Name: Edda Stephenson    MRN: [id number]         : 1960    0945 Patient arrives for research infusion without acute problems. Please see Epic for complete assessment and education provided.        Vital signs stable throughout and prior to discharge. Patient tolerated procedure well and was discharged without incident.  Edda is aware of next [facility] appointment on 3/11/24. Appointment card given.      Ms. Stephenson's vitals were reviewed prior to and after treatment.   Patient Vitals for the past 12 hrs:   Temp Pulse Resp BP   24 1149 -- 85 16 (!) 161/82   24 0945 97.5 °F (36.4 °C) 75 16 (!) 154/86         Lab results were obtained and reviewed.  No results found for this or any previous visit (from the past 12 hour(s)).    Medications given:   Medications Administered         (Belapectin/Placebo) investigational 1 each in sodium chloride 0.9 % 100 mL IVPB Admin Date  2024 Action  New Bag Dose   Rate  100 mL/hr Route  IntraVENous Administered By  Angie Garcia, RN        0.9 % sodium chloride infusion Admin Date  2024 Action  New Bag Dose  25 mL/hr Rate  25 mL/hr Route  IntraVENous Administered By  Angie Garcia, RN          Infusion was not delayed or interrupted.     Ms. Stephenson tolerated the infusion, and had no complaints.    Ms. Stephenson was discharged from [facility] in stable condition.     Future Appointments   Date Time Provider Department Center   3/11/2024  8:00 AM LIV_[facility]_NURSE_RESEARCH LIVR [facility]   3/11/2024  9:00 AM B3 PEDS LONG TX RCHPOPIC [facility]   3/25/2024  8:20 AM LIV_[facility]_NURSE_RESEARCH LIVR [facility]   3/25/2024  9:00 AM A1 PEDS MED TX RCHPOPIC [facility]   2024  8:20 AM LIV_[facility]_NURSE_RESEARCH LIVR [facility]   2024  9:00 AM B4 PEDS FASTRACK RCHPOPIC [facility]   2024  8:20 AM LIV_[facility]_NURSE_RESEARCH LIVR [facility]   2024  9:00 AM B4 PEDS FASTRACK RCHPOPIC [facility]   2024  8:20 AM

## 2024-02-26 NOTE — PROGRESS NOTES
[facility]      Chase Degroot MD, FACP, FACG, FAASLD      CLEO Draper, [facility]   Blanche Saloni, [facility]   Bell Paez, FNP-C   Carlos Alba, FNP-C    Beverly Desir, [facility]    at [facility]    [address]    [place]  [address] [phone]    FAX: [phone]   [facility]    at [facility]    [address]    [place]  [address] [phone]    FAX: [phone]       HEPATOLOGY CLINICAL RESEARCH NOTE    Edda Stephenson is a 63 y.o. female with ZAFAR cirrhosis.    The patient was seen today for stage 2/phase 3 infusion #1 visit for the [facility] clinical trial for patients with ZAFAR cirrhosis. The study is an infused therapy of Belapectin (GR-MD-02) versus placebo given every 2 weeks and evaluating is effects on fibrosis.      Today's visit was conducted per the study protocol but was scheduled.    She was seen in ortho office with concern for localized infection at the site of the incision of the right knee replacement.  She was recently admitted to the hospital for I&D and wound vac/flush and given PICC line for bid administration of outpatient cefazolin.  She has been tolerating this reasonably well at home and is presently without fevers, chills, abdominal pain.  Her stools have normalized and she is feeling well. No confusion and no fluid overload. She has had some minimal swelling of the RLE post-op, this is unchanged.     The patient was asked if any symptoms, side effects or adverse events have occurred since the last study visit.     A physical examination was performed per protocol.      All symptoms reported by the patient and the findings of the physical examination were recorded in the clinical trial

## 2024-03-11 ENCOUNTER — HOSPITAL ENCOUNTER (OUTPATIENT)
Facility: HOSPITAL | Age: 64
Setting detail: INFUSION SERIES
Discharge: HOME | End: 2024-03-11
Payer: OTHER GOVERNMENT

## 2024-03-11 ENCOUNTER — NURSE ONLY (OUTPATIENT)
Age: 64
End: 2024-03-11

## 2024-03-11 VITALS
SYSTOLIC BLOOD PRESSURE: 162 MMHG | DIASTOLIC BLOOD PRESSURE: 74 MMHG | HEART RATE: 79 BPM | TEMPERATURE: 97.3 F | RESPIRATION RATE: 16 BRPM

## 2024-03-11 DIAGNOSIS — Z00.6 RESEARCH EXAM: Primary | ICD-10-CM

## 2024-03-11 DIAGNOSIS — K75.81 NASH (NONALCOHOLIC STEATOHEPATITIS): Primary | ICD-10-CM

## 2024-03-11 PROCEDURE — 2580000003 HC RX 258: Performed by: INTERNAL MEDICINE

## 2024-03-11 PROCEDURE — 2500000003 HC RX 250 WO HCPCS: Performed by: INTERNAL MEDICINE

## 2024-03-11 PROCEDURE — 96365 THER/PROPH/DIAG IV INF INIT: CPT

## 2024-03-11 RX ORDER — SODIUM CHLORIDE 9 MG/ML
5-250 INJECTION, SOLUTION INTRAVENOUS PRN
OUTPATIENT
Start: 2024-03-25

## 2024-03-11 RX ORDER — SODIUM CHLORIDE 0.9 % (FLUSH) 0.9 %
5-40 SYRINGE (ML) INJECTION PRN
Status: DISCONTINUED | OUTPATIENT
Start: 2024-03-11 | End: 2024-03-12 | Stop reason: HOSPADM

## 2024-03-11 RX ORDER — ACETAMINOPHEN 325 MG/1
650 TABLET ORAL
OUTPATIENT
Start: 2024-03-25

## 2024-03-11 RX ORDER — ONDANSETRON 2 MG/ML
8 INJECTION INTRAMUSCULAR; INTRAVENOUS
OUTPATIENT
Start: 2024-03-25

## 2024-03-11 RX ORDER — DIPHENHYDRAMINE HYDROCHLORIDE 50 MG/ML
50 INJECTION INTRAMUSCULAR; INTRAVENOUS
OUTPATIENT
Start: 2024-03-25

## 2024-03-11 RX ORDER — EPINEPHRINE 1 MG/ML
0.3 INJECTION, SOLUTION INTRAMUSCULAR; SUBCUTANEOUS PRN
OUTPATIENT
Start: 2024-03-25

## 2024-03-11 RX ORDER — SODIUM CHLORIDE 0.9 % (FLUSH) 0.9 %
5-40 SYRINGE (ML) INJECTION PRN
OUTPATIENT
Start: 2024-03-25

## 2024-03-11 RX ORDER — ALBUTEROL SULFATE 90 UG/1
4 AEROSOL, METERED RESPIRATORY (INHALATION) PRN
OUTPATIENT
Start: 2024-03-25

## 2024-03-11 RX ORDER — SODIUM CHLORIDE 9 MG/ML
5-250 INJECTION, SOLUTION INTRAVENOUS PRN
Status: DISCONTINUED | OUTPATIENT
Start: 2024-03-11 | End: 2024-03-12 | Stop reason: HOSPADM

## 2024-03-11 RX ORDER — SODIUM CHLORIDE 9 MG/ML
INJECTION, SOLUTION INTRAVENOUS CONTINUOUS
OUTPATIENT
Start: 2024-03-25

## 2024-03-11 RX ADMIN — SODIUM CHLORIDE 25 ML/HR: 9 INJECTION, SOLUTION INTRAVENOUS at 09:10

## 2024-03-11 RX ADMIN — Medication: at 09:23

## 2024-03-11 ASSESSMENT — PAIN SCALES - GENERAL: PAINLEVEL_OUTOF10: 0

## 2024-03-11 NOTE — PROGRESS NOTES
[facility]ospit[facility] Peds/Adult Note                       Date: 2024    Name: Edda Stephenson    MRN: [id number]         : 1960    0900 Patient arrives for Belapectin/placebo clinical trial without acute problems. Please see [facility] for complete assessment and education provided.    Vital signs stable throughout and prior to discharge. Patient tolerated procedure well and was discharged without incident. Patient is aware of next hospitals appointment on 3/25/2024. Appointment card give to the Patient.      Ms. Stephenson's vitals were reviewed prior to and after treatment.   Patient Vitals for the past 12 hrs:   Temp Pulse Resp BP   24 1028 -- 79 16 (!) 162/74   24 0856 97.3 °F (36.3 °C) 69 16 138/66         Medications given:   Medications Administered         (Belapectin/Placebo) investigational 1 each in sodium chloride 0.9 % 100 mL IVPB Admin Date  2024 Action  New Bag Dose   Rate  100 mL/hr Route  IntraVENous Administered By  Judith Snell, RN        0.9 % sodium chloride infusion Admin Date  2024 Action  New Bag Dose  25 mL/hr Rate  25 mL/hr Route  IntraVENous Administered By  Judith Snell, CLARIBEL            Ms. Stephenson tolerated the infusion, and had no complaints.  Infusion was not delayed or interrupted.     Ms. Stephenson was discharged from [facility] in stable condition. Discharge Instructions provided to patient, patient verbalized understanding but denied the request for a copy after visit summary.     Future Appointments   Date Time Provider Department Center   3/25/2024  8:20 AM LIV_[facility]_NURSE_RESEARCH LIVR [facility]   3/25/2024  9:00 AM A1 PEDS MED TX [facility]   2024  8:20 AM LIV_[facility]_NURSE_RESEARCH LIVR [facility]   2024  9:00 AM B4 PEDS FASTRACK RCHPOPIC [facility]   2024  8:20 AM LIV_H_NURSE_RESEARCH LIVR [facility]   2024  9:00 AM B4 PEDS FASTRACK RCHPOPIC [facility]   2024  8:20 AM LIV_[facility]_NURSE_RESEARCH LIVR [facility]   2024  9:00 AM B4 PEDS

## 2024-03-25 ENCOUNTER — NURSE ONLY (OUTPATIENT)
Age: 64
End: 2024-03-25

## 2024-03-25 ENCOUNTER — HOSPITAL ENCOUNTER (OUTPATIENT)
Facility: HOSPITAL | Age: 64
Setting detail: INFUSION SERIES
Discharge: HOME | End: 2024-03-25
Payer: OTHER GOVERNMENT

## 2024-03-25 VITALS
RESPIRATION RATE: 16 BRPM | DIASTOLIC BLOOD PRESSURE: 87 MMHG | TEMPERATURE: 97.3 F | SYSTOLIC BLOOD PRESSURE: 176 MMHG | HEART RATE: 75 BPM

## 2024-03-25 DIAGNOSIS — K75.81 NASH (NONALCOHOLIC STEATOHEPATITIS): Primary | ICD-10-CM

## 2024-03-25 DIAGNOSIS — Z00.6 RESEARCH EXAM: Primary | ICD-10-CM

## 2024-03-25 PROCEDURE — 2500000003 HC RX 250 WO HCPCS: Performed by: INTERNAL MEDICINE

## 2024-03-25 PROCEDURE — 2580000003 HC RX 258: Performed by: INTERNAL MEDICINE

## 2024-03-25 PROCEDURE — 96365 THER/PROPH/DIAG IV INF INIT: CPT

## 2024-03-25 RX ORDER — SODIUM CHLORIDE 9 MG/ML
5-250 INJECTION, SOLUTION INTRAVENOUS PRN
OUTPATIENT
Start: 2024-04-08

## 2024-03-25 RX ORDER — SODIUM CHLORIDE 0.9 % (FLUSH) 0.9 %
5-40 SYRINGE (ML) INJECTION PRN
OUTPATIENT
Start: 2024-04-08

## 2024-03-25 RX ORDER — SODIUM CHLORIDE 9 MG/ML
5-250 INJECTION, SOLUTION INTRAVENOUS PRN
Status: DISCONTINUED | OUTPATIENT
Start: 2024-03-25 | End: 2024-03-26 | Stop reason: HOSPADM

## 2024-03-25 RX ORDER — SODIUM CHLORIDE 9 MG/ML
INJECTION, SOLUTION INTRAVENOUS CONTINUOUS
OUTPATIENT
Start: 2024-04-08

## 2024-03-25 RX ORDER — EPINEPHRINE 1 MG/ML
0.3 INJECTION, SOLUTION INTRAMUSCULAR; SUBCUTANEOUS PRN
OUTPATIENT
Start: 2024-04-08

## 2024-03-25 RX ORDER — DIPHENHYDRAMINE HYDROCHLORIDE 50 MG/ML
50 INJECTION INTRAMUSCULAR; INTRAVENOUS
OUTPATIENT
Start: 2024-04-08

## 2024-03-25 RX ORDER — ONDANSETRON 2 MG/ML
8 INJECTION INTRAMUSCULAR; INTRAVENOUS
OUTPATIENT
Start: 2024-04-08

## 2024-03-25 RX ORDER — ALBUTEROL SULFATE 90 UG/1
4 AEROSOL, METERED RESPIRATORY (INHALATION) PRN
OUTPATIENT
Start: 2024-04-08

## 2024-03-25 RX ORDER — ACETAMINOPHEN 325 MG/1
650 TABLET ORAL
OUTPATIENT
Start: 2024-04-08

## 2024-03-25 RX ORDER — SODIUM CHLORIDE 0.9 % (FLUSH) 0.9 %
5-40 SYRINGE (ML) INJECTION PRN
Status: DISCONTINUED | OUTPATIENT
Start: 2024-03-25 | End: 2024-03-26 | Stop reason: HOSPADM

## 2024-03-25 RX ADMIN — SODIUM CHLORIDE 25 ML/HR: 9 INJECTION, SOLUTION INTRAVENOUS at 09:34

## 2024-03-25 RX ADMIN — Medication: at 10:14

## 2024-03-25 ASSESSMENT — PAIN SCALES - GENERAL: PAINLEVEL_OUTOF10: 0

## 2024-03-25 NOTE — PROGRESS NOTES
[facility] Peds/Adult Note                       Date: 2024    Name: Edda Stephenson    MRN: [id number]         : 1960 Patient arrives for Belapectin/placebo clinical trial without acute problems. Please see [facility] for complete assessment and education provided.    Vital signs stable throughout and prior to discharge. Patient tolerated procedure well and was discharged without incident. Patient is aware of next [facility] appointment on 2024. Appointment card give to the Patient.      Ms. Stephenson's vitals were reviewed prior to and after treatment.   Patient Vitals for the past 12 hrs:   Temp Pulse Resp BP   24 1115 -- 75 16 (!) 176/87   24 0915 97.3 °F (36.3 °C) 76 16 (!) 145/85         Medications given:   Medications Administered         (Belapectin/Placebo) investigational 1 each in sodium chloride 0.9 % 100 mL IVPB Admin Date  2024 Action  New Bag Dose   Rate  100 mL/hr Route  IntraVENous Administered By  Judith Snell, RN        0.9 % sodium chloride infusion Admin Date  2024 Action  New Bag Dose  25 mL/hr Rate  25 mL/hr Route  IntraVENous Administered By  Judith Snell, CLARIBEL            Ms. Stephenson tolerated the infusion, and had no complaints.  Infusion was not delayed or interrupted.     Ms. Stephenson was discharged from [facility] in stable condition. Discharge Instructions provided to patient, patient verbalized understanding but denied the request for a copy after visit summary.     Future Appointments   Date Time Provider Department Center   2024  8:20 AM LIV_[facility]_NURSE_RESEARCH LIVR [facility]   2024  9:00 AM B4 PEDS FASTRACK RCHPOPIC [facility]   2024  8:20 AM LIV_[facility]_NURSE_RESEARCH LIVR [facility]   2024  9:00 AM B4 PEDS FASTRACK RCHPOPIC [facility]   2024  8:20 AM LIV_[facility]_NURSE_RESEARCH LIVR [facility]   2024  9:00 AM B4 PEDS FASTRACK RCHPOPIC [facility]   2024  8:20 AM Bell Paez APRN - NP LIVR [facility]   2024  9:00 AM

## 2024-04-08 ENCOUNTER — HOSPITAL ENCOUNTER (OUTPATIENT)
Facility: HOSPITAL | Age: 64
Setting detail: INFUSION SERIES
End: 2024-04-08
Payer: OTHER GOVERNMENT

## 2024-04-15 ENCOUNTER — NURSE ONLY (OUTPATIENT)
Age: 64
End: 2024-04-15

## 2024-04-15 ENCOUNTER — HOSPITAL ENCOUNTER (OUTPATIENT)
Facility: HOSPITAL | Age: 64
Setting detail: INFUSION SERIES
Discharge: HOME | End: 2024-04-15
Payer: OTHER GOVERNMENT

## 2024-04-15 VITALS
TEMPERATURE: 97.5 F | SYSTOLIC BLOOD PRESSURE: 143 MMHG | HEART RATE: 72 BPM | DIASTOLIC BLOOD PRESSURE: 90 MMHG | RESPIRATION RATE: 18 BRPM

## 2024-04-15 DIAGNOSIS — K75.81 NASH (NONALCOHOLIC STEATOHEPATITIS): Primary | ICD-10-CM

## 2024-04-15 DIAGNOSIS — Z00.6 RESEARCH EXAM: Primary | ICD-10-CM

## 2024-04-15 PROCEDURE — 96365 THER/PROPH/DIAG IV INF INIT: CPT

## 2024-04-15 PROCEDURE — 2580000003 HC RX 258: Performed by: INTERNAL MEDICINE

## 2024-04-15 PROCEDURE — 2500000003 HC RX 250 WO HCPCS: Performed by: INTERNAL MEDICINE

## 2024-04-15 RX ORDER — SODIUM CHLORIDE 9 MG/ML
INJECTION, SOLUTION INTRAVENOUS CONTINUOUS
OUTPATIENT
Start: 2024-04-29

## 2024-04-15 RX ORDER — SODIUM CHLORIDE 0.9 % (FLUSH) 0.9 %
5-40 SYRINGE (ML) INJECTION PRN
Status: DISCONTINUED | OUTPATIENT
Start: 2024-04-15 | End: 2024-04-16 | Stop reason: HOSPADM

## 2024-04-15 RX ORDER — ONDANSETRON 2 MG/ML
8 INJECTION INTRAMUSCULAR; INTRAVENOUS
OUTPATIENT
Start: 2024-04-29

## 2024-04-15 RX ORDER — SODIUM CHLORIDE 9 MG/ML
5-250 INJECTION, SOLUTION INTRAVENOUS PRN
Status: DISCONTINUED | OUTPATIENT
Start: 2024-04-15 | End: 2024-04-16 | Stop reason: HOSPADM

## 2024-04-15 RX ORDER — SODIUM CHLORIDE 9 MG/ML
5-250 INJECTION, SOLUTION INTRAVENOUS PRN
Status: CANCELLED | OUTPATIENT
Start: 2024-04-29

## 2024-04-15 RX ORDER — ALBUTEROL SULFATE 90 UG/1
4 AEROSOL, METERED RESPIRATORY (INHALATION) PRN
OUTPATIENT
Start: 2024-04-29

## 2024-04-15 RX ORDER — EPINEPHRINE 1 MG/ML
0.3 INJECTION, SOLUTION INTRAMUSCULAR; SUBCUTANEOUS PRN
OUTPATIENT
Start: 2024-04-29

## 2024-04-15 RX ORDER — DIPHENHYDRAMINE HYDROCHLORIDE 50 MG/ML
50 INJECTION INTRAMUSCULAR; INTRAVENOUS
OUTPATIENT
Start: 2024-04-29

## 2024-04-15 RX ORDER — SODIUM CHLORIDE 0.9 % (FLUSH) 0.9 %
5-40 SYRINGE (ML) INJECTION PRN
Status: CANCELLED | OUTPATIENT
Start: 2024-04-29

## 2024-04-15 RX ORDER — ACETAMINOPHEN 325 MG/1
650 TABLET ORAL
OUTPATIENT
Start: 2024-04-29

## 2024-04-15 RX ADMIN — Medication: at 11:41

## 2024-04-15 RX ADMIN — SODIUM CHLORIDE, PRESERVATIVE FREE 10 ML: 5 INJECTION INTRAVENOUS at 11:17

## 2024-04-15 RX ADMIN — SODIUM CHLORIDE 50 ML/HR: 9 INJECTION, SOLUTION INTRAVENOUS at 11:19

## 2024-04-15 ASSESSMENT — PAIN SCALES - GENERAL: PAINLEVEL_OUTOF10: 0

## 2024-04-15 NOTE — PROGRESS NOTES
[facility] Peds/Adult Note                       Date: April 15, 2024    Name: Edda Stephenson    MRN: [id number]         : 1960    1110 Patient arrives for Belapectin vs Research Infusion without acute problems. Please see EPIC for complete assessment and education provided.    Vital signs stable throughout and prior to discharge. Patient tolerated procedure well and was discharged without incident.  Patient/Family member is aware of next [facility] appointment on 24. Appointment card give to the Patient/Family member.      Ms. Stephenson's vitals were reviewed prior to and after treatment.   Patient Vitals for the past 12 hrs:   Temp Pulse Resp BP   04/15/24 1246 -- 72 18 (!) 143/90   04/15/24 1110 97.5 °F (36.4 °C) 71 18 136/75       Medications given:   Medications Administered         (Belapectin/Placebo) investigational 1 each in sodium chloride 0.9 % 100 mL IVPB Admin Date  04/15/2024 Action  New Bag Dose   Rate  100 mL/hr Route  IntraVENous Administered By  Yadira Bartholomew, RN        0.9 % sodium chloride infusion Admin Date  04/15/2024 Action  New Bag Dose  50 mL/hr Rate  50 mL/hr Route  IntraVENous Administered By  Yadira Bartholomew, RN        sodium chloride flush 0.9 % injection 5-40 mL Admin Date  04/15/2024 Action  Given Dose  10 mL Rate   Route  IntraVENous Administered By  Yadira Bartholomew, RN            Ms. Stephenson tolerated the treatment and had no complaints.    [facility]. Stephen[facility] condition. Discharge Instructions provided to patient, patient verbalized understanding.     Future Appointments   Date Time Provider Department Center   2024  8:20 AM [facility]_NURSE_RESEARCH LIVR [facility]   2024  8:20 AM LIV_[facility]_NURSE_RESEARCH LIVR [facility]   2024  9:00 AM PEDS FASTTRACK 2 [facility]   2024  8:20 AM Bell Paze APRN - NP LIVR [facility]   2024  9:00 AM PEDS MED INF CHAIR 1 [facility]   6/3/2024  8:00 AM Bell Peaz APRN -

## 2024-04-22 ENCOUNTER — HOSPITAL ENCOUNTER (OUTPATIENT)
Facility: HOSPITAL | Age: 64
Setting detail: INFUSION SERIES
Discharge: HOME | End: 2024-04-22
Payer: OTHER GOVERNMENT

## 2024-04-22 ENCOUNTER — NURSE ONLY (OUTPATIENT)
Age: 64
End: 2024-04-22

## 2024-04-22 VITALS
DIASTOLIC BLOOD PRESSURE: 75 MMHG | SYSTOLIC BLOOD PRESSURE: 139 MMHG | RESPIRATION RATE: 16 BRPM | HEART RATE: 73 BPM | TEMPERATURE: 97.7 F

## 2024-04-22 DIAGNOSIS — K75.81 NASH (NONALCOHOLIC STEATOHEPATITIS): Primary | ICD-10-CM

## 2024-04-22 DIAGNOSIS — Z00.6 RESEARCH EXAM: Primary | ICD-10-CM

## 2024-04-22 PROCEDURE — 2500000003 HC RX 250 WO HCPCS: Performed by: INTERNAL MEDICINE

## 2024-04-22 PROCEDURE — 96365 THER/PROPH/DIAG IV INF INIT: CPT

## 2024-04-22 PROCEDURE — 2580000003 HC RX 258: Performed by: INTERNAL MEDICINE

## 2024-04-22 RX ORDER — SODIUM CHLORIDE 0.9 % (FLUSH) 0.9 %
5-40 SYRINGE (ML) INJECTION PRN
Status: DISCONTINUED | OUTPATIENT
Start: 2024-04-22 | End: 2024-04-23 | Stop reason: HOSPADM

## 2024-04-22 RX ORDER — SODIUM CHLORIDE 9 MG/ML
5-250 INJECTION, SOLUTION INTRAVENOUS PRN
Status: DISCONTINUED | OUTPATIENT
Start: 2024-04-22 | End: 2024-04-23 | Stop reason: HOSPADM

## 2024-04-22 RX ORDER — ALBUTEROL SULFATE 90 UG/1
4 AEROSOL, METERED RESPIRATORY (INHALATION) PRN
OUTPATIENT
Start: 2024-04-29

## 2024-04-22 RX ORDER — ONDANSETRON 2 MG/ML
8 INJECTION INTRAMUSCULAR; INTRAVENOUS
OUTPATIENT
Start: 2024-04-29

## 2024-04-22 RX ORDER — DIPHENHYDRAMINE HYDROCHLORIDE 50 MG/ML
50 INJECTION INTRAMUSCULAR; INTRAVENOUS
OUTPATIENT
Start: 2024-04-29

## 2024-04-22 RX ORDER — SODIUM CHLORIDE 0.9 % (FLUSH) 0.9 %
5-40 SYRINGE (ML) INJECTION PRN
OUTPATIENT
Start: 2024-04-29

## 2024-04-22 RX ORDER — EPINEPHRINE 1 MG/ML
0.3 INJECTION, SOLUTION INTRAMUSCULAR; SUBCUTANEOUS PRN
OUTPATIENT
Start: 2024-04-29

## 2024-04-22 RX ORDER — SODIUM CHLORIDE 9 MG/ML
INJECTION, SOLUTION INTRAVENOUS CONTINUOUS
OUTPATIENT
Start: 2024-04-29

## 2024-04-22 RX ORDER — ACETAMINOPHEN 325 MG/1
650 TABLET ORAL
OUTPATIENT
Start: 2024-04-29

## 2024-04-22 RX ORDER — SODIUM CHLORIDE 9 MG/ML
5-250 INJECTION, SOLUTION INTRAVENOUS PRN
OUTPATIENT
Start: 2024-04-29

## 2024-04-22 RX ADMIN — Medication: at 10:00

## 2024-04-22 RX ADMIN — SODIUM CHLORIDE 30 ML/HR: 9 INJECTION, SOLUTION INTRAVENOUS at 09:18

## 2024-04-22 RX ADMIN — SODIUM CHLORIDE, PRESERVATIVE FREE 10 ML: 5 INJECTION INTRAVENOUS at 09:17

## 2024-04-22 ASSESSMENT — PAIN SCALES - GENERAL: PAINLEVEL_OUTOF10: 0

## 2024-04-22 NOTE — PROGRESS NOTES
[facility] Peds/Adult Note                       Date: 2024    Name: Edda Stephenson    MRN: [id number]         : 1960    1110 Patient arrives for Belapectin VS Research Infusion without acute problems. Please see EPIC for complete assessment and education provided.    Vital signs stable throughout and prior to discharge. Patient tolerated procedure well and was discharged without incident.  Patient is aware of next [facility] appointment on 24.  Appointment card give to the Patient/Family member.      Ms. Stephenson's vitals were reviewed prior to and after treatment.   Patient Vitals for the past 12 hrs:   Temp Pulse Resp BP   24 1112 -- 73 16 139/75   24 0911 97.7 °F (36.5 °C) 74 16 (!) 146/89       Medications given:   Medications Administered         (Belapectin/Placebo) investigational 1 each in sodium chloride 0.9 % 100 mL IVPB Admin Date  2024 Action  New Bag Dose   Rate  100 mL/hr Route  IntraVENous Administered By  Yadira Bartholomew, RN        0.9 % sodium chloride infusion Admin Date  2024 Action  New Bag Dose  30 mL/hr Rate  30 mL/hr Route  IntraVENous Administered By  Yadira Bartholomew, RN        sodium chloride flush 0.9 % injection 5-40 mL Admin Date  2024 Action  Given Dose  10 mL Rate   Route  IntraVENous Administered By  Yadira Bartholomew, CLARIBEL              Ms. Stephenson tolerated the treatment and had no complaints.    Ms. Stephenson was discharged [facility] in stable condition. Discharge Instructions provided to patient, patient verbalized understanding.     Future Appointments   Date Time Provider Department Center   2024  8:20 AM LIV_[facility]_NURSE_RESEARCH LIVR [facility]   2024  9:00 AM PEDS FASTTRACK 2 [facility]   2024  8:20 AM Bell Paez APRN - NP LIVR [facility]   2024  9:00 AM PEDS MED INF CHAIR 1 [facility]   6/3/2024  8:00 AM Bell Paez APRN - NP LIVR [facility]   6/3/2024  9:00 AM PEDS FASTTRACK 2 [facility]

## 2024-05-06 ENCOUNTER — HOSPITAL ENCOUNTER (OUTPATIENT)
Facility: HOSPITAL | Age: 64
Setting detail: INFUSION SERIES
Discharge: HOME | End: 2024-05-06
Payer: OTHER GOVERNMENT

## 2024-05-06 ENCOUNTER — NURSE ONLY (OUTPATIENT)
Age: 64
End: 2024-05-06

## 2024-05-06 VITALS
RESPIRATION RATE: 16 BRPM | TEMPERATURE: 98.1 F | SYSTOLIC BLOOD PRESSURE: 113 MMHG | HEART RATE: 80 BPM | DIASTOLIC BLOOD PRESSURE: 73 MMHG

## 2024-05-06 DIAGNOSIS — Z00.6 RESEARCH EXAM: Primary | ICD-10-CM

## 2024-05-06 DIAGNOSIS — K75.81 NASH (NONALCOHOLIC STEATOHEPATITIS): Primary | ICD-10-CM

## 2024-05-06 PROCEDURE — 2580000003 HC RX 258: Performed by: INTERNAL MEDICINE

## 2024-05-06 PROCEDURE — 96365 THER/PROPH/DIAG IV INF INIT: CPT

## 2024-05-06 PROCEDURE — 2500000003 HC RX 250 WO HCPCS: Performed by: INTERNAL MEDICINE

## 2024-05-06 RX ORDER — ALBUTEROL SULFATE 90 UG/1
4 AEROSOL, METERED RESPIRATORY (INHALATION) PRN
OUTPATIENT
Start: 2024-05-13

## 2024-05-06 RX ORDER — SODIUM CHLORIDE 9 MG/ML
5-250 INJECTION, SOLUTION INTRAVENOUS PRN
OUTPATIENT
Start: 2024-05-13

## 2024-05-06 RX ORDER — ONDANSETRON 2 MG/ML
8 INJECTION INTRAMUSCULAR; INTRAVENOUS
OUTPATIENT
Start: 2024-05-13

## 2024-05-06 RX ORDER — SODIUM CHLORIDE 0.9 % (FLUSH) 0.9 %
5-40 SYRINGE (ML) INJECTION PRN
OUTPATIENT
Start: 2024-05-13

## 2024-05-06 RX ORDER — ACETAMINOPHEN 325 MG/1
650 TABLET ORAL
OUTPATIENT
Start: 2024-05-13

## 2024-05-06 RX ORDER — DIPHENHYDRAMINE HYDROCHLORIDE 50 MG/ML
50 INJECTION INTRAMUSCULAR; INTRAVENOUS
OUTPATIENT
Start: 2024-05-13

## 2024-05-06 RX ORDER — EPINEPHRINE 1 MG/ML
0.3 INJECTION, SOLUTION INTRAMUSCULAR; SUBCUTANEOUS PRN
OUTPATIENT
Start: 2024-05-13

## 2024-05-06 RX ORDER — SODIUM CHLORIDE 0.9 % (FLUSH) 0.9 %
5-40 SYRINGE (ML) INJECTION PRN
Status: DISCONTINUED | OUTPATIENT
Start: 2024-05-06 | End: 2024-05-07 | Stop reason: HOSPADM

## 2024-05-06 RX ORDER — SODIUM CHLORIDE 9 MG/ML
INJECTION, SOLUTION INTRAVENOUS CONTINUOUS
OUTPATIENT
Start: 2024-05-13

## 2024-05-06 RX ORDER — SODIUM CHLORIDE 9 MG/ML
INJECTION, SOLUTION INTRAVENOUS CONTINUOUS
Status: DISCONTINUED | OUTPATIENT
Start: 2024-05-06 | End: 2024-05-07 | Stop reason: HOSPADM

## 2024-05-06 RX ADMIN — SODIUM CHLORIDE: 9 INJECTION, SOLUTION INTRAVENOUS at 09:17

## 2024-05-06 RX ADMIN — Medication: at 09:36

## 2024-05-06 ASSESSMENT — PAIN SCALES - GENERAL: PAINLEVEL_OUTOF10: 0

## 2024-05-06 NOTE — PROGRESS NOTES
[facility] Peds/Adult Note                       Date: May 6, 2024    Name: Edda Stephenson    MRN: [id number]         : 1960    0845 Patient arrives for Belapectin/placebo clinical trial without acute problems. Please see [facility] for complete assessment and education provided.    Vital signs stable throughout and prior to discharge. Patient tolerated procedure well and was discharged without incident. Patient is aware of next [facility] appointment on 2024. Appointment card give to the Patient.      Ms. Stehpenson's vitals were reviewed prior to and after treatment.   Patient Vitals for the past 12 hrs:   Temp Pulse Resp BP   24 1040 -- 63 18 135/64   24 0846 98.1 °F (36.7 °C) 69 18 122/74         Medications given:   Medications Administered         (Belapectin/Placebo) investigational 1 each in sodium chloride 0.9 % 100 mL IVPB Admin Date  2024 Action  New Bag Dose   Rate  100 mL/hr Route  IntraVENous Administered By  Judith Snell RN        0.9 % sodium chloride infusion Admin Date  2024 Action  New Bag Dose   Rate  100 mL/hr Route  IntraVENous Administered By  Judith Snell, CLARIBEL            Ms. Stephenson tolerated the infusion, and had no complaints.  Infusion was not delayed or interrupted.     Ms. Stephenson was discharged from [facility] in stable condition. Discharge Instructions provided to patient, patient verbalized understanding but denied the request for a copy after visit summary.     Future Appointments   Date Time Provider Department Center   2024  8:20 AM Bell Paez APRN - NP LIVR [facility]   2024  9:00 AM PEDS MED INF CHAIR 1 [facility]   6/3/2024  8:00 AM Bell Paez APRN - NP LIVR [facility]   6/3/2024  9:00 AM PEDS FASTTRACK 2 [facility]   2024  8:20 AM LIV_[facility]_NURSE_RESEARCH LIVR [facility]   2024  9:00 AM PEDS FASTTRACK 2 [facility]   2024  8:20 AM LIV_[facility]_NURSE_RESEARCH LIVR [facility]   2024  9:00 AM PEDS

## 2024-05-20 ENCOUNTER — OFFICE VISIT (OUTPATIENT)
Age: 64
End: 2024-05-20

## 2024-05-20 ENCOUNTER — HOSPITAL ENCOUNTER (OUTPATIENT)
Facility: HOSPITAL | Age: 64
Setting detail: INFUSION SERIES
Discharge: HOME | End: 2024-05-20
Payer: OTHER GOVERNMENT

## 2024-05-20 VITALS
DIASTOLIC BLOOD PRESSURE: 68 MMHG | RESPIRATION RATE: 18 BRPM | HEART RATE: 67 BPM | TEMPERATURE: 97 F | SYSTOLIC BLOOD PRESSURE: 124 MMHG

## 2024-05-20 DIAGNOSIS — K75.81 NASH (NONALCOHOLIC STEATOHEPATITIS): Primary | ICD-10-CM

## 2024-05-20 DIAGNOSIS — Z00.6 RESEARCH EXAM: Primary | ICD-10-CM

## 2024-05-20 PROCEDURE — 99214 OFFICE O/P EST MOD 30 MIN: CPT | Performed by: NURSE PRACTITIONER

## 2024-05-20 PROCEDURE — 2580000003 HC RX 258: Performed by: INTERNAL MEDICINE

## 2024-05-20 PROCEDURE — 2500000003 HC RX 250 WO HCPCS: Performed by: INTERNAL MEDICINE

## 2024-05-20 PROCEDURE — 96365 THER/PROPH/DIAG IV INF INIT: CPT

## 2024-05-20 RX ORDER — SODIUM CHLORIDE 0.9 % (FLUSH) 0.9 %
5-40 SYRINGE (ML) INJECTION PRN
OUTPATIENT
Start: 2024-06-03

## 2024-05-20 RX ORDER — SODIUM CHLORIDE 9 MG/ML
5-250 INJECTION, SOLUTION INTRAVENOUS PRN
OUTPATIENT
Start: 2024-06-03

## 2024-05-20 RX ORDER — SODIUM CHLORIDE 9 MG/ML
INJECTION, SOLUTION INTRAVENOUS CONTINUOUS
OUTPATIENT
Start: 2024-06-03

## 2024-05-20 RX ORDER — ACETAMINOPHEN 325 MG/1
650 TABLET ORAL
OUTPATIENT
Start: 2024-06-03

## 2024-05-20 RX ORDER — ALBUTEROL SULFATE 90 UG/1
4 AEROSOL, METERED RESPIRATORY (INHALATION) PRN
OUTPATIENT
Start: 2024-06-03

## 2024-05-20 RX ORDER — ONDANSETRON 2 MG/ML
8 INJECTION INTRAMUSCULAR; INTRAVENOUS
OUTPATIENT
Start: 2024-06-03

## 2024-05-20 RX ORDER — SODIUM CHLORIDE 0.9 % (FLUSH) 0.9 %
5-40 SYRINGE (ML) INJECTION PRN
Status: DISCONTINUED | OUTPATIENT
Start: 2024-05-20 | End: 2024-05-21 | Stop reason: HOSPADM

## 2024-05-20 RX ORDER — SODIUM CHLORIDE 9 MG/ML
5-250 INJECTION, SOLUTION INTRAVENOUS PRN
Status: DISCONTINUED | OUTPATIENT
Start: 2024-05-20 | End: 2024-05-21 | Stop reason: HOSPADM

## 2024-05-20 RX ORDER — EPINEPHRINE 1 MG/ML
0.3 INJECTION, SOLUTION INTRAMUSCULAR; SUBCUTANEOUS PRN
OUTPATIENT
Start: 2024-06-03

## 2024-05-20 RX ORDER — DIPHENHYDRAMINE HYDROCHLORIDE 50 MG/ML
50 INJECTION INTRAMUSCULAR; INTRAVENOUS
OUTPATIENT
Start: 2024-06-03

## 2024-05-20 RX ADMIN — Medication: at 09:37

## 2024-05-20 RX ADMIN — SODIUM CHLORIDE 30 ML/HR: 9 INJECTION, SOLUTION INTRAVENOUS at 09:17

## 2024-05-20 RX ADMIN — SODIUM CHLORIDE, PRESERVATIVE FREE 10 ML: 5 INJECTION INTRAVENOUS at 09:13

## 2024-05-20 ASSESSMENT — PAIN SCALES - GENERAL
PAINLEVEL_OUTOF10: 0
PAINLEVEL_OUTOF10: 0

## 2024-05-20 NOTE — PROGRESS NOTES
[facility]      Chase Degroot MD, FACP, FACG, FAASLD      CLEO Draper, [facility]   Blanche Zieglerstevie, [facility]   Bell Paez, FNP-C   Carlos Alba, FNP-C    Beverly Desir, [facility]    at [facility]    [address], [address]    [place]  [address] [phone]    FAX: [phone]   [facility]    at [facility]    [address], [address]    [place]  [address] [phone]    FAX: [phone]       HEPATOLOGY CLINICAL RESEARCH NOTE    Edda Stephenson is a 63 y.o. female with ZAFAR cirrhosis.    The patient was seen today for stage 2/phase 3 infusion #7 visit for the [facility] clinical trial for patients with ZAFAR cirrhosis. The study is an infused therapy of Belapectin (GR-MD-02) versus placebo given every 2 weeks and evaluating is effects on fibrosis.      Today's visit was conducted per the study protocol but was scheduled.    The patient was asked if any symptoms, side effects or adverse events have occurred since the last study visit.     A physical examination was performed per protocol.      All symptoms reported by the patient and the findings of the physical examination were recorded in the clinical trial documents.    Symptoms of clinical significance were:  She is having some nausea with the protonix medication.  She will go down to 1 pill daily at night.  NCS    Findings on physical examination of clinical significance were: None.     She had Right knee replacement surgery on 10/25/23.  Had infection following surgery, was treated with Abx for diagnosis of C diff which developed within 5 days.     Patient has underlying cirrhosis and is in need of HCC screening. HCC screening is being performed at appropriate intervals as part of the clinical trial. The  Offered, feeding was successful

## 2024-05-20 NOTE — PROGRESS NOTES
[facility] Peds/Adult Note                       Date: May 20, 2024    Name: Edda Stephenson    MRN: [id number]         : 1960    0900 Patient arrives for Belapectin/Placebo Research Infusion without acute problems. Please see Epic for complete assessment and education provided. Patient's infusion was not delayed or interrupted.     Vital signs stable throughout and prior to discharge. Patient tolerated procedure well and was discharged without incident.  Patient is aware of next [facility] appointment on 2024. Appointment card give to the Patient.       Ms. Stephenson's vitals were reviewed prior to and after treatment.   Patient Vitals for the past 12 hrs:   Temp Pulse Resp BP   24 1044 -- 67 18 124/68   24 0900 97 °F (36.1 °C) 99 18 129/81       Medications given:   Medications Administered         (Belapectin/Placebo) investigational 1 each in sodium chloride 0.9 % 100 mL IVPB Admin Date  2024 Action  New Bag Dose   Rate  100 mL/hr Route  IntraVENous Administered By  Estela Puckett RN        0.9 % sodium chloride infusion Admin Date  2024 Action  New Bag Dose  30 mL/hr Rate  30 mL/hr Route  IntraVENous Administered By  Estela Puckett RN        sodium chloride flush 0.9 % injection 5-40 mL Admin Date  2024 Action  Given Dose  10 mL Rate   Route  IntraVENous Administered By  Estela Puckett RN          Ms. Stephenson tolerated the infusion, and had no complaints.    Ms. Stephenson was discharged from [facility] in stable condition.     ESTELA PUCKETT RN  May 20, 2024  12:02 PM

## 2024-06-03 ENCOUNTER — NURSE ONLY (OUTPATIENT)
Age: 64
End: 2024-06-03

## 2024-06-03 ENCOUNTER — HOSPITAL ENCOUNTER (OUTPATIENT)
Facility: HOSPITAL | Age: 64
Setting detail: INFUSION SERIES
Discharge: HOME | End: 2024-06-03
Payer: OTHER GOVERNMENT

## 2024-06-03 VITALS
OXYGEN SATURATION: 98 % | TEMPERATURE: 97.5 F | RESPIRATION RATE: 18 BRPM | DIASTOLIC BLOOD PRESSURE: 76 MMHG | SYSTOLIC BLOOD PRESSURE: 124 MMHG | HEART RATE: 77 BPM

## 2024-06-03 DIAGNOSIS — Z00.6 RESEARCH EXAM: Primary | ICD-10-CM

## 2024-06-03 DIAGNOSIS — K75.81 NASH (NONALCOHOLIC STEATOHEPATITIS): Primary | ICD-10-CM

## 2024-06-03 PROCEDURE — 2580000003 HC RX 258: Performed by: INTERNAL MEDICINE

## 2024-06-03 PROCEDURE — 96365 THER/PROPH/DIAG IV INF INIT: CPT

## 2024-06-03 PROCEDURE — 2500000003 HC RX 250 WO HCPCS: Performed by: INTERNAL MEDICINE

## 2024-06-03 RX ORDER — SODIUM CHLORIDE 9 MG/ML
5-250 INJECTION, SOLUTION INTRAVENOUS PRN
OUTPATIENT
Start: 2024-06-17

## 2024-06-03 RX ORDER — SODIUM CHLORIDE 0.9 % (FLUSH) 0.9 %
5-40 SYRINGE (ML) INJECTION PRN
OUTPATIENT
Start: 2024-06-17

## 2024-06-03 RX ORDER — ONDANSETRON 2 MG/ML
8 INJECTION INTRAMUSCULAR; INTRAVENOUS
OUTPATIENT
Start: 2024-06-17

## 2024-06-03 RX ORDER — ALBUTEROL SULFATE 90 UG/1
4 AEROSOL, METERED RESPIRATORY (INHALATION) PRN
OUTPATIENT
Start: 2024-06-17

## 2024-06-03 RX ORDER — ACETAMINOPHEN 325 MG/1
650 TABLET ORAL
OUTPATIENT
Start: 2024-06-17

## 2024-06-03 RX ORDER — DIPHENHYDRAMINE HYDROCHLORIDE 50 MG/ML
50 INJECTION INTRAMUSCULAR; INTRAVENOUS
OUTPATIENT
Start: 2024-06-17

## 2024-06-03 RX ORDER — SODIUM CHLORIDE 9 MG/ML
5-250 INJECTION, SOLUTION INTRAVENOUS PRN
Status: DISCONTINUED | OUTPATIENT
Start: 2024-06-03 | End: 2024-06-04 | Stop reason: HOSPADM

## 2024-06-03 RX ORDER — EPINEPHRINE 1 MG/ML
0.3 INJECTION, SOLUTION INTRAMUSCULAR; SUBCUTANEOUS PRN
OUTPATIENT
Start: 2024-06-17

## 2024-06-03 RX ORDER — SODIUM CHLORIDE 9 MG/ML
INJECTION, SOLUTION INTRAVENOUS CONTINUOUS
OUTPATIENT
Start: 2024-06-17

## 2024-06-03 RX ORDER — SODIUM CHLORIDE 0.9 % (FLUSH) 0.9 %
5-40 SYRINGE (ML) INJECTION PRN
Status: DISCONTINUED | OUTPATIENT
Start: 2024-06-03 | End: 2024-06-04 | Stop reason: HOSPADM

## 2024-06-03 RX ADMIN — SODIUM CHLORIDE 30 ML/HR: 9 INJECTION, SOLUTION INTRAVENOUS at 08:58

## 2024-06-03 RX ADMIN — Medication: at 09:33

## 2024-06-03 RX ADMIN — SODIUM CHLORIDE, PRESERVATIVE FREE 10 ML: 5 INJECTION INTRAVENOUS at 08:55

## 2024-06-03 ASSESSMENT — PAIN SCALES - GENERAL
PAINLEVEL_OUTOF10: 0
PAINLEVEL_OUTOF10: 0

## 2024-06-03 NOTE — PROGRESS NOTES
[facility] Peds/Adult Note                       Date: Maryana 3, 2024    Name: Edda Stephenson    MRN: [id number]         : 1960    0845 Patient arrives for Belapectin/Placebo Research Infusion without acute problems. Please see Epic for complete assessment and education provided. Patient's infusion was not delayed or interrupted.     Vital signs stable throughout and prior to discharge. Patient tolerated procedure well and was discharged without incident.  Patient is aware of next [facility] appointment on 2024.  Appointment card give to the Patient.       Ms. Stephenson's vitals were reviewed prior to and after treatment.   Patient Vitals for the past 12 hrs:   Temp Pulse Resp BP SpO2   24 1037 -- 77 18 124/76 --   24 0845 97.5 °F (36.4 °C) 78 18 126/75 98 %       Medications given:   Medications Administered         (Belapectin/Placebo) investigational 1 each in sodium chloride 0.9 % 100 mL IVPB Admin Date  2024 Action  New Bag Dose   Rate  100 mL/hr Route  IntraVENous Administered By  Estela Puckett RN        0.9 % sodium chloride infusion Admin Date  2024 Action  New Bag Dose  30 mL/hr Rate  30 mL/hr Route  IntraVENous Administered By  Estela Puckett RN        sodium chloride flush 0.9 % injection 5-40 mL Admin Date  2024 Action  Given Dose  10 mL Rate   Route  IntraVENous Administered By  Estela Puckett RN          Ms. Stephenson tolerated the infusion, and had no complaints.    Ms. Stephenson was discharged from [facility] in stable condition.     ESTELA PUCKETT RN  Maryana 3, 2024  11:57 AM

## 2024-06-17 ENCOUNTER — NURSE ONLY (OUTPATIENT)
Age: 64
End: 2024-06-17

## 2024-06-17 ENCOUNTER — HOSPITAL ENCOUNTER (OUTPATIENT)
Facility: HOSPITAL | Age: 64
Setting detail: INFUSION SERIES
Discharge: HOME | End: 2024-06-17
Payer: OTHER GOVERNMENT

## 2024-06-17 ENCOUNTER — HOSPITAL ENCOUNTER (OUTPATIENT)
Facility: HOSPITAL | Age: 64
Discharge: HOME | End: 2024-06-20
Payer: OTHER GOVERNMENT

## 2024-06-17 VITALS
TEMPERATURE: 97.4 F | RESPIRATION RATE: 17 BRPM | HEART RATE: 64 BPM | SYSTOLIC BLOOD PRESSURE: 138 MMHG | OXYGEN SATURATION: 98 % | DIASTOLIC BLOOD PRESSURE: 72 MMHG

## 2024-06-17 DIAGNOSIS — Z00.6 RESEARCH EXAM: Primary | ICD-10-CM

## 2024-06-17 DIAGNOSIS — K75.81 NASH (NONALCOHOLIC STEATOHEPATITIS): Primary | ICD-10-CM

## 2024-06-17 DIAGNOSIS — K76.9 LIVER LESION: ICD-10-CM

## 2024-06-17 PROCEDURE — A9579 GAD-BASE MR CONTRAST NOS,1ML: HCPCS | Performed by: NURSE PRACTITIONER

## 2024-06-17 PROCEDURE — 96365 THER/PROPH/DIAG IV INF INIT: CPT

## 2024-06-17 PROCEDURE — 74183 MRI ABD W/O CNTR FLWD CNTR: CPT

## 2024-06-17 PROCEDURE — 6360000004 HC RX CONTRAST MEDICATION: Performed by: NURSE PRACTITIONER

## 2024-06-17 PROCEDURE — 2500000003 HC RX 250 WO HCPCS: Performed by: INTERNAL MEDICINE

## 2024-06-17 PROCEDURE — 2580000003 HC RX 258: Performed by: INTERNAL MEDICINE

## 2024-06-17 RX ORDER — EPINEPHRINE 1 MG/ML
0.3 INJECTION, SOLUTION INTRAMUSCULAR; SUBCUTANEOUS PRN
OUTPATIENT
Start: 2024-07-01

## 2024-06-17 RX ORDER — SODIUM CHLORIDE 0.9 % (FLUSH) 0.9 %
5-40 SYRINGE (ML) INJECTION PRN
OUTPATIENT
Start: 2024-07-01

## 2024-06-17 RX ORDER — 0.9 % SODIUM CHLORIDE 0.9 %
100 INTRAVENOUS SOLUTION INTRAVENOUS ONCE
Status: DISCONTINUED | OUTPATIENT
Start: 2024-06-17 | End: 2024-06-21 | Stop reason: HOSPADM

## 2024-06-17 RX ORDER — SODIUM CHLORIDE 9 MG/ML
INJECTION, SOLUTION INTRAVENOUS CONTINUOUS
OUTPATIENT
Start: 2024-07-01

## 2024-06-17 RX ORDER — ONDANSETRON 2 MG/ML
8 INJECTION INTRAMUSCULAR; INTRAVENOUS
OUTPATIENT
Start: 2024-07-01

## 2024-06-17 RX ORDER — ACETAMINOPHEN 325 MG/1
650 TABLET ORAL
OUTPATIENT
Start: 2024-07-01

## 2024-06-17 RX ORDER — SODIUM CHLORIDE 9 MG/ML
5-250 INJECTION, SOLUTION INTRAVENOUS PRN
Status: DISCONTINUED | OUTPATIENT
Start: 2024-06-17 | End: 2024-06-18 | Stop reason: HOSPADM

## 2024-06-17 RX ORDER — DIPHENHYDRAMINE HYDROCHLORIDE 50 MG/ML
50 INJECTION INTRAMUSCULAR; INTRAVENOUS
OUTPATIENT
Start: 2024-07-01

## 2024-06-17 RX ORDER — ALBUTEROL SULFATE 90 UG/1
4 AEROSOL, METERED RESPIRATORY (INHALATION) PRN
OUTPATIENT
Start: 2024-07-01

## 2024-06-17 RX ORDER — SODIUM CHLORIDE 9 MG/ML
5-250 INJECTION, SOLUTION INTRAVENOUS PRN
OUTPATIENT
Start: 2024-07-01

## 2024-06-17 RX ADMIN — GADOTERIDOL 15 ML: 279.3 INJECTION, SOLUTION INTRAVENOUS at 15:18

## 2024-06-17 RX ADMIN — Medication: at 10:00

## 2024-06-17 RX ADMIN — SODIUM CHLORIDE 30 ML/HR: 9 INJECTION, SOLUTION INTRAVENOUS at 09:20

## 2024-06-17 ASSESSMENT — PAIN SCALES - GENERAL: PAINLEVEL_OUTOF10: 0

## 2024-06-17 NOTE — PROGRESS NOTES
[facility] Peds/Adult Note                       Date: 2024    Name: Edda Stephenson    MRN: [id number]         : 1960    0910 Patient arrives for Belapectin VS Placebo Research Infusion without acute problems. Please see EPIC for complete assessment and education provided.    Vital signs stable throughout and prior to discharge. Patient tolerated procedure well and was discharged without incident.  Patient is aware of next [facility] appointment on 24.   Appointment card give to the Patient.      Ms. Stephenson's vitals were reviewed prior to and after treatment.   Patient Vitals for the past 12 hrs:   Temp Pulse Resp BP SpO2   24 1115 -- 64 17 138/72 --   24 0912 97.4 °F (36.3 °C) 84 18 (!) 149/81 98 %       Medications given:   Medications Administered         (Belapectin/Placebo) investigational 1 each in sodium chloride 0.9 % 100 mL IVPB Admin Date  2024 Action  New Bag Dose   Rate  100 mL/hr Route  IntraVENous Administered By  Yadira Bartholomew, RN        0.9 % sodium chloride infusion Admin Date  2024 Action  New Bag Dose  30 mL/hr Rate  30 mL/hr Route  IntraVENous Administered By  Yadira Bartholomew, RN        0.9 % sodium chloride infusion Admin Date  2024 Action  Rate/Dose Change Dose   Rate  130 mL/hr Route  IntraVENous Administered By  Yadira Bartholomew, RN        0.9 % sodium chloride infusion Admin Date  2024 Action  Rate/Dose Change Dose   Rate  20 mL/hr Route  IntraVENous Administered By  Yadira Bartholomew, CLARIBEL              Ms. Stephenson tolerated the treatment and had no complaints.    Ms. Stephenson was discharged from [facility] in stable condition. Discharge Instructions provided to patient, patient verbalized understanding.     Future Appointments   Date Time Provider Department Center   2024  2:30 PM [facility] MRI 1 [facility]   2024  8:20 AM LIV_[facility]_NURSE_RESEARCH LIVR BS AMB   2024  9:00 AM PEDS FASTTRACK 2 [facility]   7/15/2024

## 2024-07-01 ENCOUNTER — HOSPITAL ENCOUNTER (OUTPATIENT)
Facility: HOSPITAL | Age: 64
Setting detail: INFUSION SERIES
Discharge: HOME | End: 2024-07-01
Payer: OTHER GOVERNMENT

## 2024-07-01 ENCOUNTER — NURSE ONLY (OUTPATIENT)
Age: 64
End: 2024-07-01

## 2024-07-01 VITALS
DIASTOLIC BLOOD PRESSURE: 77 MMHG | SYSTOLIC BLOOD PRESSURE: 151 MMHG | HEART RATE: 70 BPM | RESPIRATION RATE: 16 BRPM | TEMPERATURE: 97.8 F | OXYGEN SATURATION: 100 %

## 2024-07-01 DIAGNOSIS — Z00.6 RESEARCH EXAM: Primary | ICD-10-CM

## 2024-07-01 DIAGNOSIS — K75.81 NASH (NONALCOHOLIC STEATOHEPATITIS): Primary | ICD-10-CM

## 2024-07-01 PROCEDURE — 2580000003 HC RX 258: Performed by: INTERNAL MEDICINE

## 2024-07-01 PROCEDURE — 96365 THER/PROPH/DIAG IV INF INIT: CPT

## 2024-07-01 PROCEDURE — 2500000003 HC RX 250 WO HCPCS: Performed by: INTERNAL MEDICINE

## 2024-07-01 RX ORDER — SODIUM CHLORIDE 9 MG/ML
5-250 INJECTION, SOLUTION INTRAVENOUS PRN
Status: DISCONTINUED | OUTPATIENT
Start: 2024-07-01 | End: 2024-07-02 | Stop reason: HOSPADM

## 2024-07-01 RX ORDER — ALBUTEROL SULFATE 90 UG/1
4 AEROSOL, METERED RESPIRATORY (INHALATION) PRN
OUTPATIENT
Start: 2024-07-15

## 2024-07-01 RX ORDER — SODIUM CHLORIDE 0.9 % (FLUSH) 0.9 %
5-40 SYRINGE (ML) INJECTION PRN
OUTPATIENT
Start: 2024-07-15

## 2024-07-01 RX ORDER — DIPHENHYDRAMINE HYDROCHLORIDE 50 MG/ML
50 INJECTION INTRAMUSCULAR; INTRAVENOUS
OUTPATIENT
Start: 2024-07-15

## 2024-07-01 RX ORDER — SODIUM CHLORIDE 9 MG/ML
INJECTION, SOLUTION INTRAVENOUS CONTINUOUS
OUTPATIENT
Start: 2024-07-15

## 2024-07-01 RX ORDER — EPINEPHRINE 1 MG/ML
0.3 INJECTION, SOLUTION INTRAMUSCULAR; SUBCUTANEOUS PRN
OUTPATIENT
Start: 2024-07-15

## 2024-07-01 RX ORDER — ACETAMINOPHEN 325 MG/1
650 TABLET ORAL
OUTPATIENT
Start: 2024-07-15

## 2024-07-01 RX ORDER — ONDANSETRON 2 MG/ML
8 INJECTION INTRAMUSCULAR; INTRAVENOUS
OUTPATIENT
Start: 2024-07-15

## 2024-07-01 RX ORDER — SODIUM CHLORIDE 0.9 % (FLUSH) 0.9 %
5-40 SYRINGE (ML) INJECTION PRN
Status: DISCONTINUED | OUTPATIENT
Start: 2024-07-01 | End: 2024-07-02 | Stop reason: HOSPADM

## 2024-07-01 RX ORDER — SODIUM CHLORIDE 9 MG/ML
5-250 INJECTION, SOLUTION INTRAVENOUS PRN
OUTPATIENT
Start: 2024-07-15

## 2024-07-01 RX ADMIN — SODIUM CHLORIDE 30 ML/HR: 9 INJECTION, SOLUTION INTRAVENOUS at 08:58

## 2024-07-01 RX ADMIN — Medication: at 09:45

## 2024-07-01 RX ADMIN — SODIUM CHLORIDE, PRESERVATIVE FREE 10 ML: 5 INJECTION INTRAVENOUS at 08:55

## 2024-07-01 ASSESSMENT — PAIN SCALES - GENERAL: PAINLEVEL_OUTOF10: 0

## 2024-07-01 NOTE — PROGRESS NOTES
[facility] Peds/Adult Note                       Date: 2024    Name: Edda Stephenson    MRN: [id number]         : 1960    0845 Patient arrives for Belapectin/Placebo Research Infusion without acute problems. Please see Epic for complete assessment and education provided. Patient's infusion was not delayed or interrupted.     Vital signs stable throughout and prior to discharge. Patient tolerated procedure well and was discharged without incident.  Patient is aware of next [facility] appointment on 07/15/2024.  Appointment card give to the Patient/       Ms. Stephenson's vitals were reviewed prior to and after treatment.   Patient Vitals for the past 12 hrs:   Temp Pulse Resp BP SpO2   24 1045 -- 70 16 (!) 151/77 --   24 0845 97.8 °F (36.6 °C) 71 18 (!) 146/71 100 %       Medications given:   Medications Administered         (Belapectin/Placebo) investigational 1 each in sodium chloride 0.9 % 100 mL IVPB Admin Date  2024 Action  New Bag Dose   Rate  100 mL/hr Route  IntraVENous Administered By  Estela Puckett RN        0.9 % sodium chloride infusion Admin Date  2024 Action  New Bag Dose  30 mL/hr Rate  30 mL/hr Route  IntraVENous Administered By  Estela Puckett RN        sodium chloride flush 0.9 % injection 5-40 mL Admin Date  2024 Action  Given Dose  10 mL Rate   Route  IntraVENous Administered By  Estela Puckett RN          Ms. Stephenson tolerated the infusion, and had no complaints.    Ms. Stephenson was discharged from [facility] in stable condition.     ESTELA PUCKETT RN  2024  11:15 AM

## 2024-07-15 ENCOUNTER — NURSE ONLY (OUTPATIENT)
Age: 64
End: 2024-07-15

## 2024-07-15 ENCOUNTER — CLINICAL DOCUMENTATION (OUTPATIENT)
Age: 64
End: 2024-07-15

## 2024-07-15 ENCOUNTER — HOSPITAL ENCOUNTER (OUTPATIENT)
Facility: HOSPITAL | Age: 64
Setting detail: INFUSION SERIES
Discharge: HOME | End: 2024-07-15
Payer: OTHER GOVERNMENT

## 2024-07-15 VITALS
HEART RATE: 66 BPM | DIASTOLIC BLOOD PRESSURE: 78 MMHG | OXYGEN SATURATION: 97 % | SYSTOLIC BLOOD PRESSURE: 131 MMHG | RESPIRATION RATE: 16 BRPM | TEMPERATURE: 97.6 F

## 2024-07-15 DIAGNOSIS — K75.81 NASH (NONALCOHOLIC STEATOHEPATITIS): Primary | ICD-10-CM

## 2024-07-15 DIAGNOSIS — Z00.6 EXAMINATION OF PARTICIPANT IN CLINICAL TRIAL: Primary | ICD-10-CM

## 2024-07-15 PROCEDURE — 96365 THER/PROPH/DIAG IV INF INIT: CPT

## 2024-07-15 PROCEDURE — 2580000003 HC RX 258: Performed by: INTERNAL MEDICINE

## 2024-07-15 PROCEDURE — 2500000003 HC RX 250 WO HCPCS: Performed by: INTERNAL MEDICINE

## 2024-07-15 RX ORDER — LEVOCETIRIZINE DIHYDROCHLORIDE 5 MG/1
5 TABLET, FILM COATED ORAL AS NEEDED
COMMUNITY

## 2024-07-15 RX ORDER — SODIUM CHLORIDE 9 MG/ML
5-250 INJECTION, SOLUTION INTRAVENOUS PRN
Status: DISCONTINUED | OUTPATIENT
Start: 2024-07-15 | End: 2024-07-16 | Stop reason: HOSPADM

## 2024-07-15 RX ORDER — DIPHENHYDRAMINE HYDROCHLORIDE 50 MG/ML
50 INJECTION INTRAMUSCULAR; INTRAVENOUS
OUTPATIENT
Start: 2024-07-15

## 2024-07-15 RX ORDER — SODIUM CHLORIDE 9 MG/ML
INJECTION, SOLUTION INTRAVENOUS CONTINUOUS
OUTPATIENT
Start: 2024-07-15

## 2024-07-15 RX ORDER — ONDANSETRON 2 MG/ML
8 INJECTION INTRAMUSCULAR; INTRAVENOUS
OUTPATIENT
Start: 2024-07-15

## 2024-07-15 RX ORDER — ACETAMINOPHEN 325 MG/1
650 TABLET ORAL
OUTPATIENT
Start: 2024-07-15

## 2024-07-15 RX ORDER — ALBUTEROL SULFATE 90 UG/1
4 AEROSOL, METERED RESPIRATORY (INHALATION) PRN
OUTPATIENT
Start: 2024-07-15

## 2024-07-15 RX ORDER — SODIUM CHLORIDE 9 MG/ML
5-250 INJECTION, SOLUTION INTRAVENOUS PRN
OUTPATIENT
Start: 2024-07-15

## 2024-07-15 RX ORDER — SODIUM CHLORIDE 0.9 % (FLUSH) 0.9 %
5-40 SYRINGE (ML) INJECTION PRN
OUTPATIENT
Start: 2024-07-15

## 2024-07-15 RX ORDER — EPINEPHRINE 1 MG/ML
0.3 INJECTION, SOLUTION INTRAMUSCULAR; SUBCUTANEOUS PRN
OUTPATIENT
Start: 2024-07-15

## 2024-07-15 RX ADMIN — SODIUM CHLORIDE 25 ML/HR: 9 INJECTION, SOLUTION INTRAVENOUS at 09:12

## 2024-07-15 RX ADMIN — Medication: at 09:40

## 2024-07-15 ASSESSMENT — PAIN SCALES - GENERAL: PAINLEVEL_OUTOF10: 0

## 2024-07-15 NOTE — PROGRESS NOTES
[facility] Peds/Adult Note                       Date: July 15, 2024    Name: Edda Stephenson    MRN: [id number]         : 1960    0900 Patient arrives for research infusion without acute problems. Please see Epic for complete assessment and education provided.        Vital signs stable throughout and prior to discharge. Patient tolerated procedure well and was discharged without incident.  Edda is aware of next [facility] appointment on 24. Appointment card given.      Ms. Stephenson's vitals were reviewed prior to and after treatment.   Patient Vitals for the past 12 hrs:   Temp Pulse Resp BP SpO2   07/15/24 1050 -- 66 16 131/78 --   07/15/24 0900 97.6 °F (36.4 °C) 74 17 138/71 97 %         Lab results were obtained and reviewed.  No results found for this or any previous visit (from the past 12 hour(s)).    Medications given:   Medications Administered         (Belapectin/Placebo) investigational 1 each in sodium chloride 0.9 % 100 mL IVPB Admin Date  07/15/2024 Action  New Bag Dose   Rate  100 mL/hr Route  IntraVENous Administered By  Angie Garcia RN        0.9 % sodium chloride infusion Admin Date  07/15/2024 Action  New Bag Dose  25 mL/hr Rate  25 mL/hr Route  IntraVENous Administered By  Angie Garcia RN        0.9 % sodium chloride infusion Admin Date  07/15/2024 Action  Rate/Dose Verify Dose   Rate  25 mL/hr Route  IntraVENous Administered By  Angie Garcia RN        0.9 % sodium chloride infusion Admin Date  07/15/2024 Action  Rate/Dose Change Dose   Rate  125 mL/hr Route  IntraVENous Administered By  Angie Garcia RN        0.9 % sodium chloride infusion Admin Date  07/15/2024 Action  Rate/Dose Change Dose   Rate  20 mL/hr Route  IntraVENous Administered By  Angie Garcia, CLARIBEL          The infusion was not delayed or interrupted.    Ms. Stephenson tolerated the infusion, and had no complaints.    Ms. Stephenson was discharged from [facility] in stable

## 2024-07-29 ENCOUNTER — HOSPITAL ENCOUNTER (OUTPATIENT)
Facility: HOSPITAL | Age: 64
Setting detail: INFUSION SERIES
Discharge: HOME | End: 2024-07-29
Payer: OTHER GOVERNMENT

## 2024-07-29 ENCOUNTER — NURSE ONLY (OUTPATIENT)
Age: 64
End: 2024-07-29

## 2024-07-29 VITALS
DIASTOLIC BLOOD PRESSURE: 72 MMHG | HEART RATE: 71 BPM | RESPIRATION RATE: 18 BRPM | TEMPERATURE: 97.5 F | SYSTOLIC BLOOD PRESSURE: 122 MMHG

## 2024-07-29 DIAGNOSIS — K75.81 NASH (NONALCOHOLIC STEATOHEPATITIS): Primary | ICD-10-CM

## 2024-07-29 DIAGNOSIS — Z00.6 RESEARCH EXAM: Primary | ICD-10-CM

## 2024-07-29 PROCEDURE — 2500000003 HC RX 250 WO HCPCS: Performed by: INTERNAL MEDICINE

## 2024-07-29 PROCEDURE — 2580000003 HC RX 258: Performed by: INTERNAL MEDICINE

## 2024-07-29 PROCEDURE — 96365 THER/PROPH/DIAG IV INF INIT: CPT

## 2024-07-29 RX ORDER — ACETAMINOPHEN 325 MG/1
650 TABLET ORAL
OUTPATIENT
Start: 2024-08-12

## 2024-07-29 RX ORDER — ONDANSETRON 2 MG/ML
8 INJECTION INTRAMUSCULAR; INTRAVENOUS
OUTPATIENT
Start: 2024-08-12

## 2024-07-29 RX ORDER — EPINEPHRINE 1 MG/ML
0.3 INJECTION, SOLUTION INTRAMUSCULAR; SUBCUTANEOUS PRN
OUTPATIENT
Start: 2024-08-12

## 2024-07-29 RX ORDER — DIPHENHYDRAMINE HYDROCHLORIDE 50 MG/ML
50 INJECTION INTRAMUSCULAR; INTRAVENOUS
OUTPATIENT
Start: 2024-08-12

## 2024-07-29 RX ORDER — SODIUM CHLORIDE 9 MG/ML
INJECTION, SOLUTION INTRAVENOUS CONTINUOUS
OUTPATIENT
Start: 2024-08-12

## 2024-07-29 RX ORDER — ALBUTEROL SULFATE 90 UG/1
4 AEROSOL, METERED RESPIRATORY (INHALATION) PRN
OUTPATIENT
Start: 2024-08-12

## 2024-07-29 RX ADMIN — Medication: at 09:35

## 2024-07-29 ASSESSMENT — PAIN SCALES - GENERAL
PAINLEVEL_OUTOF10: 0
PAINLEVEL_OUTOF10: 0

## 2024-07-29 NOTE — PROGRESS NOTES
[facility] Peds/Adult Note                   Date: 2024    Name: Edda Stephenson    MRN: [id number]       : 1960    0900 Patient arrives for Belapectin/Placebo Research Infusion without acute problems. Please see Epic for complete assessment and education provided.    Vital signs stable throughout and prior to discharge. Patient tolerated procedure well and was discharged without incident.  Patient is aware of next [facility] appointment on 2024. Appointment card give to the patient.      Ms. Stephenson's vitals were reviewed prior to and after treatment.   Patient Vitals for the past 12 hrs:   Temp Pulse Resp BP   24 1049 -- 71 18 122/72   24 0857 97.5 °F (36.4 °C) 72 18 137/77     Medications given:   Medications Administered         (Belapectin/Placebo) investigational 1 each in sodium chloride 0.9 % 100 mL IVPB Admin Date  2024 Action  New Bag Dose   Rate  100 mL/hr Route  IntraVENous Administered By  María Daley RN              Ms. Stephenson tolerated the infusion, and had no complaints.    Ms. Stephenson was discharged from [facility] in stable condition.     Future Appointments   Date Time Provider Department Center   2024  8:00 AM [facility] US OP 1 [facility]   2024  8:30 AM Bell Paez APRN - NP LIVR [facility]   2024 10:00 AM PEDS FASTTRACK 1 [facility]   2024  8:20 AM LIV_[facility]_NURSE_RESEARCH LIVR [facility]   2024  9:00 AM PEDS FASTTRACK 2 [facility]   2024  8:20 AM LIV_[facility]_NURSE_RESEARCH LIVR [facility]   2024  9:00 AM PEDS FASTTRACK 2 [facility]   2024  8:20 AM LIV_[facility]_NURSE_RESEARCH LIVR [facility]   2024  9:00 AM PEDS FASTTRACK 2 [facility]   10/7/2024  8:20 AM LIV_[facility]_NURSE_RESEARCH LIVR [facility]   10/7/2024  9:00 AM PEDS FASTTRACK 2 [facility]   10/21/2024  8:20 AM LIV_[facility]_NURSE_RESEARCH LIVR BS AMB   10/21/2024  9:00 AM PEDS FASTTRACK 2 [facility]   2024  8:00 AM Blanche Guallpa, NESS - NP DI[facility]ONDR [facility] AM[facility]

## 2024-08-12 ENCOUNTER — APPOINTMENT (OUTPATIENT)
Facility: HOSPITAL | Age: 64
End: 2024-08-12

## 2024-08-14 ENCOUNTER — OFFICE VISIT (OUTPATIENT)
Age: 64
End: 2024-08-14

## 2024-08-14 ENCOUNTER — HOSPITAL ENCOUNTER (OUTPATIENT)
Facility: HOSPITAL | Age: 64
Setting detail: INFUSION SERIES
Discharge: HOME | End: 2024-08-14

## 2024-08-14 ENCOUNTER — HOSPITAL ENCOUNTER (OUTPATIENT)
Facility: HOSPITAL | Age: 64
Discharge: HOME | End: 2024-08-17

## 2024-08-14 VITALS
DIASTOLIC BLOOD PRESSURE: 55 MMHG | RESPIRATION RATE: 18 BRPM | OXYGEN SATURATION: 98 % | HEART RATE: 76 BPM | SYSTOLIC BLOOD PRESSURE: 122 MMHG | TEMPERATURE: 97.6 F

## 2024-08-14 DIAGNOSIS — Z00.6 EXAMINATION OF PARTICIPANT IN CLINICAL TRIAL: ICD-10-CM

## 2024-08-14 DIAGNOSIS — Z00.6 RESEARCH EXAM: Primary | ICD-10-CM

## 2024-08-14 DIAGNOSIS — K75.81 NASH (NONALCOHOLIC STEATOHEPATITIS): Primary | ICD-10-CM

## 2024-08-14 LAB
EKG ATRIAL RATE: 67 BPM
EKG DIAGNOSIS: NORMAL
EKG P AXIS: 68 DEGREES
EKG P-R INTERVAL: 156 MS
EKG Q-T INTERVAL: 404 MS
EKG QRS DURATION: 80 MS
EKG QTC CALCULATION (BAZETT): 426 MS
EKG R AXIS: 15 DEGREES
EKG T AXIS: 37 DEGREES
EKG VENTRICULAR RATE: 67 BPM

## 2024-08-14 PROCEDURE — 99214 OFFICE O/P EST MOD 30 MIN: CPT | Performed by: NURSE PRACTITIONER

## 2024-08-14 PROCEDURE — 76700 US EXAM ABDOM COMPLETE: CPT

## 2024-08-14 PROCEDURE — 96365 THER/PROPH/DIAG IV INF INIT: CPT

## 2024-08-14 PROCEDURE — 2500000003 HC RX 250 WO HCPCS: Performed by: INTERNAL MEDICINE

## 2024-08-14 PROCEDURE — 2580000003 HC RX 258: Performed by: INTERNAL MEDICINE

## 2024-08-14 RX ORDER — ALBUTEROL SULFATE 90 UG/1
4 AEROSOL, METERED RESPIRATORY (INHALATION) PRN
OUTPATIENT
Start: 2024-08-26

## 2024-08-14 RX ORDER — ONDANSETRON 2 MG/ML
8 INJECTION INTRAMUSCULAR; INTRAVENOUS
OUTPATIENT
Start: 2024-08-26

## 2024-08-14 RX ORDER — DIPHENHYDRAMINE HYDROCHLORIDE 50 MG/ML
50 INJECTION INTRAMUSCULAR; INTRAVENOUS
OUTPATIENT
Start: 2024-08-26

## 2024-08-14 RX ORDER — EPINEPHRINE 1 MG/ML
0.3 INJECTION, SOLUTION INTRAMUSCULAR; SUBCUTANEOUS PRN
OUTPATIENT
Start: 2024-08-26

## 2024-08-14 RX ORDER — ACETAMINOPHEN 325 MG/1
650 TABLET ORAL
OUTPATIENT
Start: 2024-08-26

## 2024-08-14 RX ORDER — SODIUM CHLORIDE 9 MG/ML
INJECTION, SOLUTION INTRAVENOUS CONTINUOUS
OUTPATIENT
Start: 2024-08-26

## 2024-08-14 RX ADMIN — Medication: at 11:01

## 2024-08-14 ASSESSMENT — PAIN SCALES - GENERAL: PAINLEVEL_OUTOF10: 0

## 2024-08-14 NOTE — PROGRESS NOTES
[facility]      Chase Degroot MD, FACP, FACG, FAASLD      CLEO Draper, [facility]   Blanche Zieglerstevie, [facility]   Bell Paez, FNP-C   Carlos Alba, FNP-C    Beverly Desir, [facility]    at [facility]    [address], [address]    [place]  [address] [phone]    FAX: [phone]   [facility]    at [facility]    [address], [address]    [place]  [address] [phone]    FAX: [phone]       HEPATOLOGY CLINICAL RESEARCH NOTE    Edda Stephenson is a 64 y.o. female with ZAFAR cirrhosis.    The patient was seen today for stage 2/phase 3 infusion #13 visit for the [facility] clinical trial for patients with ZAFAR cirrhosis. The study is an infused therapy of Belapectin (GR-MD-02) versus placebo given every 2 weeks and evaluating is effects on fibrosis.      Today's visit was conducted per the study protocol but was scheduled.    The patient was asked if any symptoms, side effects or adverse events have occurred since the last study visit.     A physical examination was performed per protocol.      All symptoms reported by the patient and the findings of the physical examination were recorded in the clinical trial documents.    Symptoms of clinical significance were:  She is having some nausea with the protonix medication.  She will go down to 1 pill daily at night.  NCS    Findings on physical examination of clinical significance were: None.     She had Right knee replacement surgery on 10/25/23.  Had infection following surgery, was treated with Abx for diagnosis of C diff which developed within 5 days.     Patient has underlying cirrhosis and is in need of HCC screening. HCC screening is being performed at appropriate intervals as part of the clinical trial. The

## 2024-08-26 ENCOUNTER — OFFICE VISIT (OUTPATIENT)
Age: 64
End: 2024-08-26

## 2024-08-26 ENCOUNTER — HOSPITAL ENCOUNTER (OUTPATIENT)
Facility: HOSPITAL | Age: 64
Setting detail: INFUSION SERIES
Discharge: HOME | End: 2024-08-26
Payer: OTHER GOVERNMENT

## 2024-08-26 VITALS
OXYGEN SATURATION: 100 % | TEMPERATURE: 97.5 F | RESPIRATION RATE: 18 BRPM | SYSTOLIC BLOOD PRESSURE: 115 MMHG | DIASTOLIC BLOOD PRESSURE: 66 MMHG | HEART RATE: 66 BPM

## 2024-08-26 DIAGNOSIS — K74.69 OTHER CIRRHOSIS OF LIVER (HCC): ICD-10-CM

## 2024-08-26 DIAGNOSIS — K75.81 NASH (NONALCOHOLIC STEATOHEPATITIS): Primary | ICD-10-CM

## 2024-08-26 DIAGNOSIS — Z00.6 RESEARCH EXAM: Primary | ICD-10-CM

## 2024-08-26 PROCEDURE — 2580000003 HC RX 258: Performed by: INTERNAL MEDICINE

## 2024-08-26 PROCEDURE — 96365 THER/PROPH/DIAG IV INF INIT: CPT

## 2024-08-26 PROCEDURE — 99214 OFFICE O/P EST MOD 30 MIN: CPT | Performed by: NURSE PRACTITIONER

## 2024-08-26 PROCEDURE — 91200 LIVER ELASTOGRAPHY: CPT | Performed by: NURSE PRACTITIONER

## 2024-08-26 PROCEDURE — 2500000003 HC RX 250 WO HCPCS: Performed by: INTERNAL MEDICINE

## 2024-08-26 RX ORDER — EPINEPHRINE 1 MG/ML
0.3 INJECTION, SOLUTION INTRAMUSCULAR; SUBCUTANEOUS PRN
OUTPATIENT
Start: 2024-09-09

## 2024-08-26 RX ORDER — DIPHENHYDRAMINE HYDROCHLORIDE 50 MG/ML
50 INJECTION INTRAMUSCULAR; INTRAVENOUS
OUTPATIENT
Start: 2024-09-09

## 2024-08-26 RX ORDER — ONDANSETRON 2 MG/ML
8 INJECTION INTRAMUSCULAR; INTRAVENOUS
OUTPATIENT
Start: 2024-09-09

## 2024-08-26 RX ORDER — ALBUTEROL SULFATE 90 UG/1
4 AEROSOL, METERED RESPIRATORY (INHALATION) PRN
OUTPATIENT
Start: 2024-09-09

## 2024-08-26 RX ORDER — SODIUM CHLORIDE 9 MG/ML
INJECTION, SOLUTION INTRAVENOUS CONTINUOUS
OUTPATIENT
Start: 2024-09-09

## 2024-08-26 RX ORDER — ACETAMINOPHEN 325 MG/1
650 TABLET ORAL
OUTPATIENT
Start: 2024-09-09

## 2024-08-26 RX ADMIN — Medication: at 10:09

## 2024-08-26 ASSESSMENT — PAIN SCALES - GENERAL: PAINLEVEL_OUTOF10: 0

## 2024-08-26 NOTE — PROGRESS NOTES
[facility]      Chase Degroot MD, FACP, FACG, FAASLD      CLEO Draper, [facility]   Blanche Ratstevie, [facility]   Bell Paez, FNP-C   Carlos Alba, FNP-C    Beverly Desir, [facility]    at [facility]    [address], [address]    [place]  [address] [phone]    FAX: [phone]   [facility]    at [facility]    [address]    [place]  [address] [phone]    FAX: [phone]       HEPATOLOGY CLINICAL RESEARCH NOTE    Edda Stephenson is a 64 y.o. female with ZAFAR cirrhosis.    The patient was seen today for stage 2/phase 3 infusion #14 visit for the [facility] clinical trial for patients with ZAFAR cirrhosis. The study is an infused therapy of Belapectin (GR-MD-02) versus placebo given every 2 weeks and evaluating is effects on fibrosis.      Today's visit was conducted per the study protocol but was scheduled.    The patient was asked if any symptoms, side effects or adverse events have occurred since the last study visit.     A physical examination was performed per protocol.      All symptoms reported by the patient and the findings of the physical examination were recorded in the clinical trial documents.    Symptoms of clinical significance were:  None    Findings on physical examination of clinical significance were: None.     She had Right knee replacement surgery on 10/25/23.  Had infection following surgery, was treated with Abx for diagnosis of C diff which developed within 5 days.     Patient has underlying cirrhosis and is in need of HCC screening. HCC screening is being performed at appropriate intervals as part of the clinical trial. The most recent imaging was US performed in 8/2024.  This demonstrated a stable 1 cm hypoechoic lesion

## 2024-08-26 NOTE — PROGRESS NOTES
\[facility]\"" Peds/Adult Note                   Date: 2024    Name: Edda Stephenson    MRN: [id number]       : 1960    0945 Patient arrives for Belapectin/Placebo Research Infusion without acute problems. Please see Epic for complete assessment and education provided.      Vital signs stable throughout and prior to discharge. Patient tolerated procedure well and was discharged without incident.  Patient is aware of next \[facility]\"" appointment on 2024.  Appointment card give to the patient.    There were no delays or interruptions with patients infusion today       Ms. Stephenson's vitals were reviewed prior to and after treatment.   Patient Vitals for the past 12 hrs:   Temp Pulse Resp BP SpO2   24 1114 -- 66 18 115/66 --   24 0945 97.5 °F (36.4 °C) 67 18 126/67 100 %       Medications given:   Medications Administered         (Belapectin/Placebo) investigational 1 each in sodium chloride 0.9 % 100 mL IVPB Admin Date  2024 Action  New Bag Dose   Rate  100 mL/hr Route  IntraVENous Documented By  Yadira Bartholomew, RN              Ms. Stephenson tolerated the infusion, and had no complaints.    [facility]. Stephen[facility] in stable condition.     Future Appointments   Date Time Provider Department Center   2024  8:20 AM LIV_[facility]_NURSE_RESEARCH LIVR [facility]   2024  9:00 AM PEDS FASTTRACK 2 [facility]   2024  8:20 AM LIV_[facility]_NURSE_RESEARCH [facility]   2024 10:00 AM PEDS FASTTRACK 1 [facility]   10/7/2024  8:20 AM LIV_[facility]_NURSE_RESEARCH L[facility]R [facility]   10/7/2024  9:00 AM PEDS FASTTRACK 2 [facility]   10/21/2024  8:20 AM LIV_[facility]_NURSE_RESEARCH L[facility]R [facility]   10/21/2024  9:00 AM PEDS FASTTRACK 2 [facility]   2024  8:00 AM Blanche Guallpa APRN - NP LIVR [facility]   2024  9:00 AM PEDS FASTTRACK 2 [facility]   2024  8:20 AM LIV_[facility]_NURSE_RESEARCH LIVR [facility]   2024  9:00 AM [facility]S [facility]   2024  8:20 AM

## 2024-08-28 LAB
AFP L3 MFR SERPL: 5.9 % (ref 0–9.9)
AFP SERPL-MCNC: 8.4 NG/ML (ref 0–9.2)

## 2024-09-09 ENCOUNTER — NURSE ONLY (OUTPATIENT)
Age: 64
End: 2024-09-09

## 2024-09-09 ENCOUNTER — HOSPITAL ENCOUNTER (OUTPATIENT)
Facility: HOSPITAL | Age: 64
Setting detail: INFUSION SERIES
Discharge: HOME | End: 2024-09-09

## 2024-09-09 VITALS
RESPIRATION RATE: 16 BRPM | DIASTOLIC BLOOD PRESSURE: 84 MMHG | SYSTOLIC BLOOD PRESSURE: 144 MMHG | HEART RATE: 74 BPM | TEMPERATURE: 97.1 F

## 2024-09-09 DIAGNOSIS — Z00.6 RESEARCH EXAM: Primary | ICD-10-CM

## 2024-09-09 DIAGNOSIS — K75.81 NASH (NONALCOHOLIC STEATOHEPATITIS): Primary | ICD-10-CM

## 2024-09-09 PROCEDURE — 2500000003 HC RX 250 WO HCPCS: Performed by: INTERNAL MEDICINE

## 2024-09-09 PROCEDURE — 96365 THER/PROPH/DIAG IV INF INIT: CPT

## 2024-09-09 PROCEDURE — 2580000003 HC RX 258: Performed by: INTERNAL MEDICINE

## 2024-09-09 RX ORDER — MELOXICAM 15 MG/1
15 TABLET ORAL
COMMUNITY
Start: 2024-08-24 | End: 2024-09-09

## 2024-09-09 RX ORDER — EPINEPHRINE 1 MG/ML
0.3 INJECTION, SOLUTION INTRAMUSCULAR; SUBCUTANEOUS PRN
OUTPATIENT
Start: 2024-09-23

## 2024-09-09 RX ORDER — DIPHENHYDRAMINE HYDROCHLORIDE 50 MG/ML
50 INJECTION INTRAMUSCULAR; INTRAVENOUS
OUTPATIENT
Start: 2024-09-23

## 2024-09-09 RX ORDER — ONDANSETRON 2 MG/ML
8 INJECTION INTRAMUSCULAR; INTRAVENOUS
OUTPATIENT
Start: 2024-09-23

## 2024-09-09 RX ORDER — SODIUM CHLORIDE 9 MG/ML
INJECTION, SOLUTION INTRAVENOUS CONTINUOUS
OUTPATIENT
Start: 2024-09-23

## 2024-09-09 RX ORDER — SODIUM CHLORIDE 9 MG/ML
INJECTION, SOLUTION INTRAVENOUS CONTINUOUS
Status: DISCONTINUED | OUTPATIENT
Start: 2024-09-09 | End: 2024-09-10 | Stop reason: HOSPADM

## 2024-09-09 RX ORDER — ACETAMINOPHEN 325 MG/1
650 TABLET ORAL
OUTPATIENT
Start: 2024-09-23

## 2024-09-09 RX ORDER — ALBUTEROL SULFATE 90 UG/1
4 INHALANT RESPIRATORY (INHALATION) PRN
OUTPATIENT
Start: 2024-09-23

## 2024-09-09 RX ADMIN — Medication: at 10:05

## 2024-09-09 RX ADMIN — SODIUM CHLORIDE: 9 INJECTION, SOLUTION INTRAVENOUS at 11:12

## 2024-09-09 ASSESSMENT — PAIN SCALES - GENERAL: PAINLEVEL_OUTOF10: 0

## 2024-09-23 ENCOUNTER — APPOINTMENT (OUTPATIENT)
Facility: HOSPITAL | Age: 64
End: 2024-09-23

## 2024-09-25 ENCOUNTER — HOSPITAL ENCOUNTER (OUTPATIENT)
Facility: HOSPITAL | Age: 64
Setting detail: INFUSION SERIES
Discharge: HOME | End: 2024-09-25
Payer: OTHER GOVERNMENT

## 2024-09-25 ENCOUNTER — NURSE ONLY (OUTPATIENT)
Age: 64
End: 2024-09-25

## 2024-09-25 VITALS
DIASTOLIC BLOOD PRESSURE: 76 MMHG | OXYGEN SATURATION: 99 % | HEART RATE: 80 BPM | RESPIRATION RATE: 18 BRPM | TEMPERATURE: 98 F | SYSTOLIC BLOOD PRESSURE: 148 MMHG

## 2024-09-25 DIAGNOSIS — K75.81 NASH (NONALCOHOLIC STEATOHEPATITIS): Primary | ICD-10-CM

## 2024-09-25 DIAGNOSIS — Z00.6 RESEARCH EXAM: Primary | ICD-10-CM

## 2024-09-25 PROCEDURE — 2500000003 HC RX 250 WO HCPCS: Performed by: INTERNAL MEDICINE

## 2024-09-25 PROCEDURE — 2580000003 HC RX 258: Performed by: INTERNAL MEDICINE

## 2024-09-25 PROCEDURE — 96365 THER/PROPH/DIAG IV INF INIT: CPT

## 2024-09-25 RX ORDER — SODIUM CHLORIDE 9 MG/ML
INJECTION, SOLUTION INTRAVENOUS CONTINUOUS
OUTPATIENT
Start: 2024-10-07

## 2024-09-25 RX ORDER — SODIUM CHLORIDE 9 MG/ML
INJECTION, SOLUTION INTRAVENOUS CONTINUOUS
Status: DISCONTINUED | OUTPATIENT
Start: 2024-09-25 | End: 2024-09-25 | Stop reason: SDUPTHER

## 2024-09-25 RX ORDER — ACETAMINOPHEN 325 MG/1
650 TABLET ORAL
OUTPATIENT
Start: 2024-10-07

## 2024-09-25 RX ORDER — SODIUM CHLORIDE 9 MG/ML
INJECTION, SOLUTION INTRAVENOUS CONTINUOUS
Status: DISCONTINUED | OUTPATIENT
Start: 2024-09-25 | End: 2024-09-26 | Stop reason: HOSPADM

## 2024-09-25 RX ORDER — ONDANSETRON 2 MG/ML
8 INJECTION INTRAMUSCULAR; INTRAVENOUS
OUTPATIENT
Start: 2024-10-07

## 2024-09-25 RX ORDER — ALBUTEROL SULFATE 90 UG/1
4 INHALANT RESPIRATORY (INHALATION) PRN
OUTPATIENT
Start: 2024-10-07

## 2024-09-25 RX ORDER — DIPHENHYDRAMINE HYDROCHLORIDE 50 MG/ML
50 INJECTION INTRAMUSCULAR; INTRAVENOUS
OUTPATIENT
Start: 2024-10-07

## 2024-09-25 RX ORDER — EPINEPHRINE 1 MG/ML
0.3 INJECTION, SOLUTION INTRAMUSCULAR; SUBCUTANEOUS PRN
OUTPATIENT
Start: 2024-10-07

## 2024-09-25 RX ADMIN — SODIUM CHLORIDE 100 ML/HR: 9 INJECTION, SOLUTION INTRAVENOUS at 09:46

## 2024-09-25 RX ADMIN — Medication: at 10:21

## 2024-09-25 ASSESSMENT — PAIN SCALES - GENERAL
PAINLEVEL_OUTOF10: 0
PAINLEVEL_OUTOF10: 0

## 2024-09-25 NOTE — PROGRESS NOTES
[facility] Peds/Adult Note                       Date: 2024    Name: Edda Stephenson    MRN: [id number]         : 1960 Patient arrives for Belapectin/Placebo research Infusion without acute problems. Please see Epic for complete assessment and education provided. Patient's infusion was not delayed or interrupted.     Vital signs stable throughout and prior to discharge. Patient tolerated procedure well and was discharged without incident.  Patient is aware of next [facility] appointment on 10/07/2024.  Appointment card give to the Patient.       Ms. Stephenson's vitals were reviewed prior to and after treatment.   Patient Vitals for the past 12 hrs:   Temp Pulse Resp BP SpO2   24 1129 98 °F (36.7 °C) 80 18 (!) 148/76 --   24 0930 97.8 °F (36.6 °C) 75 18 (!) 146/52 99 %       Medications given:   Medications Administered         (Belapectin/Placebo) investigational 1 each in sodium chloride 0.9 % 100 mL IVPB Admin Date  2024 Action  New Bag Dose   Rate  100 mL/hr Route  IntraVENous Documented By  Estela Puckett RN        0.9 % sodium chloride infusion Admin Date  2024 Action  New Bag Dose  100 mL/hr Rate  100 mL/hr Route  IntraVENous Documented By  Estela Puckett RN          Ms. Stephenson tolerated the infusion, and had no complaints.    Ms. Stephensno was discharged from [facility] in stable condition.     ESTELA PUCKETT RN  2024  11:58 AM

## 2024-10-07 ENCOUNTER — NURSE ONLY (OUTPATIENT)
Age: 64
End: 2024-10-07

## 2024-10-07 ENCOUNTER — HOSPITAL ENCOUNTER (OUTPATIENT)
Facility: HOSPITAL | Age: 64
Setting detail: INFUSION SERIES
Discharge: HOME | End: 2024-10-07

## 2024-10-07 VITALS
BODY MASS INDEX: 29.03 KG/M2 | HEART RATE: 76 BPM | RESPIRATION RATE: 16 BRPM | TEMPERATURE: 96 F | SYSTOLIC BLOOD PRESSURE: 124 MMHG | WEIGHT: 190.92 LBS | DIASTOLIC BLOOD PRESSURE: 70 MMHG

## 2024-10-07 VITALS
SYSTOLIC BLOOD PRESSURE: 137 MMHG | DIASTOLIC BLOOD PRESSURE: 86 MMHG | TEMPERATURE: 97.4 F | HEART RATE: 74 BPM | OXYGEN SATURATION: 96 % | RESPIRATION RATE: 18 BRPM

## 2024-10-07 DIAGNOSIS — Z00.6 RESEARCH EXAM: Primary | ICD-10-CM

## 2024-10-07 DIAGNOSIS — K75.81 NASH (NONALCOHOLIC STEATOHEPATITIS): Primary | ICD-10-CM

## 2024-10-07 PROCEDURE — 96365 THER/PROPH/DIAG IV INF INIT: CPT

## 2024-10-07 PROCEDURE — 2500000003 HC RX 250 WO HCPCS: Performed by: INTERNAL MEDICINE

## 2024-10-07 PROCEDURE — 2580000003 HC RX 258: Performed by: INTERNAL MEDICINE

## 2024-10-07 RX ORDER — ONDANSETRON 2 MG/ML
8 INJECTION INTRAMUSCULAR; INTRAVENOUS
OUTPATIENT
Start: 2024-10-21

## 2024-10-07 RX ORDER — ALBUTEROL SULFATE 90 UG/1
4 INHALANT RESPIRATORY (INHALATION) PRN
OUTPATIENT
Start: 2024-10-21

## 2024-10-07 RX ORDER — ACETAMINOPHEN 325 MG/1
650 TABLET ORAL
OUTPATIENT
Start: 2024-10-21

## 2024-10-07 RX ORDER — EPINEPHRINE 1 MG/ML
0.3 INJECTION, SOLUTION INTRAMUSCULAR; SUBCUTANEOUS PRN
OUTPATIENT
Start: 2024-10-21

## 2024-10-07 RX ORDER — SODIUM CHLORIDE 9 MG/ML
INJECTION, SOLUTION INTRAVENOUS CONTINUOUS
OUTPATIENT
Start: 2024-10-21

## 2024-10-07 RX ORDER — TRIAMCINOLONE ACETONIDE 1 MG/G
CREAM TOPICAL 2 TIMES DAILY PRN
COMMUNITY

## 2024-10-07 RX ORDER — DIPHENHYDRAMINE HYDROCHLORIDE 50 MG/ML
50 INJECTION INTRAMUSCULAR; INTRAVENOUS
OUTPATIENT
Start: 2024-10-21

## 2024-10-07 RX ADMIN — Medication: at 09:48

## 2024-10-07 ASSESSMENT — PAIN SCALES - GENERAL: PAINLEVEL_OUTOF10: 0

## 2024-10-07 NOTE — PROGRESS NOTES
[facility] Peds/Adult Note                   Date: 2024    Name: Edda Stephenson    MRN: [id number]       : 1960    0900 Patient arrives for Belapectin/Placebo Research Infusion without acute problems. Please see Epic for complete assessment and education provided.    Patients infusion was not delayed or interrupted.     Vital signs stable throughout and prior to discharge. Patient tolerated procedure well and was discharged without incident.  Patient is aware of next [facility] appointment on 10/21/2024.  Appointment card give to the Patient.      Ms. Stephenson's vitals were reviewed prior to and after treatment.   Patient Vitals for the past 12 hrs:   Temp Pulse Resp BP SpO2   10/07/24 1100 -- 74 18 137/86 --   10/07/24 0900 97.4 °F (36.3 °C) 76 18 133/76 96 %     Medications given:   Medications Administered         (Belapectin/Placebo) investigational 1 each in sodium chloride 0.9 % 100 mL IVPB Admin Date  10/07/2024 Action  New Bag Dose   Rate  100 mL/hr Route  IntraVENous Documented By  María Daley RN              Ms. Stephenson tolerated the infusion, and had no complaints.    Ms. Stephenson was discharged from [facility] in stable condition.     Future Appointments   Date Time Provider Department Center   10/21/2024  8:20 AM LIV_[facility]_NURSE_RESEARCH [facility]   10/21/2024  9:00 AM PEDS FASTTRACK 2 [facility]   2024  8:00 AM Blanche Guallpa APRN - NP LIVR [facility]   2024  9:00 AM PEDS FASTTRACK 2 [facility]   2024  8:20 AM LIV_[facility]_NURSE_RESEARCH [facility]R [facility]   2024  9:00 AM PEDS FASTTRACK 2 [facility]   2024  8:20 AM LIV_[facility]_NURSE_RESEARCH [facility]   2024  9:00 AM PEDS FASTTRACK 2 [facility]   2024  8:20 AM LIV_[facility]_NURSE_RESEARCH [facility]   2024  9:00 AM PEDS FASTTRACK 2 [facility]   2024  8:20 AM [facility]NURSE_RESEARCH [facility]   2024  9:00 AM [facility]   2025  8:20 AM

## 2024-10-21 ENCOUNTER — HOSPITAL ENCOUNTER (OUTPATIENT)
Facility: HOSPITAL | Age: 64
Setting detail: INFUSION SERIES
Discharge: HOME | End: 2024-10-21

## 2024-10-21 ENCOUNTER — NURSE ONLY (OUTPATIENT)
Age: 64
End: 2024-10-21

## 2024-10-21 VITALS
BODY MASS INDEX: 28.29 KG/M2 | RESPIRATION RATE: 16 BRPM | SYSTOLIC BLOOD PRESSURE: 136 MMHG | DIASTOLIC BLOOD PRESSURE: 73 MMHG | TEMPERATURE: 97.8 F | HEART RATE: 74 BPM | WEIGHT: 186.07 LBS

## 2024-10-21 VITALS
HEART RATE: 71 BPM | SYSTOLIC BLOOD PRESSURE: 122 MMHG | RESPIRATION RATE: 16 BRPM | TEMPERATURE: 97.3 F | DIASTOLIC BLOOD PRESSURE: 80 MMHG | OXYGEN SATURATION: 97 %

## 2024-10-21 DIAGNOSIS — Z00.6 RESEARCH EXAM: Primary | ICD-10-CM

## 2024-10-21 DIAGNOSIS — K75.81 NASH (NONALCOHOLIC STEATOHEPATITIS): Primary | ICD-10-CM

## 2024-10-21 PROCEDURE — 96365 THER/PROPH/DIAG IV INF INIT: CPT

## 2024-10-21 PROCEDURE — 2580000003 HC RX 258: Performed by: INTERNAL MEDICINE

## 2024-10-21 PROCEDURE — 2500000003 HC RX 250 WO HCPCS: Performed by: INTERNAL MEDICINE

## 2024-10-21 RX ORDER — ONDANSETRON 2 MG/ML
8 INJECTION INTRAMUSCULAR; INTRAVENOUS
OUTPATIENT
Start: 2024-11-04

## 2024-10-21 RX ORDER — SODIUM CHLORIDE 9 MG/ML
INJECTION, SOLUTION INTRAVENOUS CONTINUOUS
Status: DISCONTINUED | OUTPATIENT
Start: 2024-10-21 | End: 2024-10-22 | Stop reason: HOSPADM

## 2024-10-21 RX ORDER — ACETAMINOPHEN 325 MG/1
650 TABLET ORAL
OUTPATIENT
Start: 2024-11-04

## 2024-10-21 RX ORDER — DIPHENHYDRAMINE HYDROCHLORIDE 50 MG/ML
50 INJECTION INTRAMUSCULAR; INTRAVENOUS
OUTPATIENT
Start: 2024-11-04

## 2024-10-21 RX ORDER — SODIUM CHLORIDE 9 MG/ML
INJECTION, SOLUTION INTRAVENOUS CONTINUOUS
OUTPATIENT
Start: 2024-11-04

## 2024-10-21 RX ORDER — EPINEPHRINE 1 MG/ML
0.3 INJECTION, SOLUTION INTRAMUSCULAR; SUBCUTANEOUS PRN
OUTPATIENT
Start: 2024-11-04

## 2024-10-21 RX ORDER — ALBUTEROL SULFATE 90 UG/1
4 INHALANT RESPIRATORY (INHALATION) PRN
OUTPATIENT
Start: 2024-11-04

## 2024-10-21 RX ORDER — MELOXICAM 15 MG/1
15 TABLET ORAL AS NEEDED
COMMUNITY
Start: 2024-09-27

## 2024-10-21 RX ADMIN — SODIUM CHLORIDE 100 ML/HR: 9 INJECTION, SOLUTION INTRAVENOUS at 09:13

## 2024-10-21 RX ADMIN — Medication: at 09:39

## 2024-10-21 ASSESSMENT — PAIN SCALES - GENERAL
PAINLEVEL_OUTOF10: 0
PAINLEVEL_OUTOF10: 0

## 2024-10-21 NOTE — PROGRESS NOTES
[facility] Peds/Adult Note                       Date: 2024    Name: Edda Stephenson    MRN: [id number]         : 1960    0850 Patient arrives for Belapectin/Placebo Research Infusion without acute problems. Please see Epic for complete assessment and education provided. Patient's infusion was not delayed or interrupted.     Vital signs stable throughout and prior to discharge. Patient tolerated procedure well and was discharged without incident.  Patient is aware of next [facility] appointment on 2024.  Appointment card give to the Patient.       Ms. Stephenson's vitals were reviewed prior to and after treatment.   Patient Vitals for the past 12 hrs:   Temp Pulse Resp BP SpO2   10/21/24 1048 -- 71 16 122/80 --   10/21/24 0850 97.3 °F (36.3 °C) 72 18 136/82 97 %       Medications given:   Medications Administered         (Belapectin/Placebo) investigational 1 each in sodium chloride 0.9 % 100 mL IVPB Admin Date  10/21/2024 Action  New Bag Dose   Rate  100 mL/hr Route  IntraVENous Documented By  Estela Puckett RN        0.9 % sodium chloride infusion Admin Date  10/21/2024 Action  New Bag Dose  100 mL/hr Rate  100 mL/hr Route  IntraVENous Documented By  Estela Puckett RN          Ms. Stephenson tolerated the infusion, and had no complaints.    Ms. Stephenson was discharged from [facility] in stable condition.       ESTELA PUCKETT RN  2024  11:33 AM

## 2024-11-04 ENCOUNTER — HOSPITAL ENCOUNTER (OUTPATIENT)
Facility: HOSPITAL | Age: 64
Setting detail: INFUSION SERIES
Discharge: HOME | End: 2024-11-04

## 2024-11-04 ENCOUNTER — OFFICE VISIT (OUTPATIENT)
Age: 64
End: 2024-11-04

## 2024-11-04 VITALS
TEMPERATURE: 98 F | DIASTOLIC BLOOD PRESSURE: 74 MMHG | SYSTOLIC BLOOD PRESSURE: 141 MMHG | OXYGEN SATURATION: 98 % | HEART RATE: 66 BPM | RESPIRATION RATE: 16 BRPM

## 2024-11-04 DIAGNOSIS — K74.69 OTHER CIRRHOSIS OF LIVER (HCC): ICD-10-CM

## 2024-11-04 DIAGNOSIS — K75.81 NASH (NONALCOHOLIC STEATOHEPATITIS): Primary | ICD-10-CM

## 2024-11-04 DIAGNOSIS — K75.81 NASH (NONALCOHOLIC STEATOHEPATITIS): ICD-10-CM

## 2024-11-04 DIAGNOSIS — Z00.6 EXAMINATION OF PARTICIPANT IN CLINICAL TRIAL: ICD-10-CM

## 2024-11-04 PROCEDURE — 99213 OFFICE O/P EST LOW 20 MIN: CPT | Performed by: NURSE PRACTITIONER

## 2024-11-04 PROCEDURE — 2500000003 HC RX 250 WO HCPCS: Performed by: INTERNAL MEDICINE

## 2024-11-04 PROCEDURE — 96365 THER/PROPH/DIAG IV INF INIT: CPT

## 2024-11-04 PROCEDURE — 2580000003 HC RX 258: Performed by: INTERNAL MEDICINE

## 2024-11-04 RX ORDER — EPINEPHRINE 1 MG/ML
0.3 INJECTION, SOLUTION INTRAMUSCULAR; SUBCUTANEOUS PRN
OUTPATIENT
Start: 2024-11-18

## 2024-11-04 RX ORDER — SODIUM CHLORIDE 9 MG/ML
INJECTION, SOLUTION INTRAVENOUS CONTINUOUS
OUTPATIENT
Start: 2024-11-18

## 2024-11-04 RX ORDER — SODIUM CHLORIDE 9 MG/ML
INJECTION, SOLUTION INTRAVENOUS CONTINUOUS
Status: DISPENSED | OUTPATIENT
Start: 2024-11-04 | End: 2024-11-04

## 2024-11-04 RX ORDER — ONDANSETRON 2 MG/ML
8 INJECTION INTRAMUSCULAR; INTRAVENOUS
OUTPATIENT
Start: 2024-11-18

## 2024-11-04 RX ORDER — PANTOPRAZOLE SODIUM 40 MG/1
40 TABLET, DELAYED RELEASE ORAL DAILY
Qty: 30 TABLET | Refills: 3
Start: 2024-11-04

## 2024-11-04 RX ORDER — ACETAMINOPHEN 325 MG/1
650 TABLET ORAL
OUTPATIENT
Start: 2024-11-18

## 2024-11-04 RX ORDER — ALBUTEROL SULFATE 90 UG/1
4 INHALANT RESPIRATORY (INHALATION) PRN
OUTPATIENT
Start: 2024-11-18

## 2024-11-04 RX ORDER — DIPHENHYDRAMINE HYDROCHLORIDE 50 MG/ML
50 INJECTION INTRAMUSCULAR; INTRAVENOUS
OUTPATIENT
Start: 2024-11-18

## 2024-11-04 RX ADMIN — Medication: at 09:31

## 2024-11-04 RX ADMIN — SODIUM CHLORIDE 100 ML/HR: 9 INJECTION, SOLUTION INTRAVENOUS at 09:20

## 2024-11-04 ASSESSMENT — PAIN SCALES - GENERAL
PAINLEVEL_OUTOF10: 0
PAINLEVEL_OUTOF10: 0

## 2024-11-04 NOTE — PROGRESS NOTES
[facility] Peds/Adult Note                       Date: 2024    Name: Edda Stephenson    MRN: [id number]         : 1960    0905 Patient arrives for Belapectin/Placebo Research Infusion without acute problems. Please see Epic for complete assessment and education provided. Patient's infusion was not delayed or interrupted.     Vital signs stable throughout and prior to discharge. Patient tolerated procedure well and was discharged without incident.  Patient is aware of next [facility] appointment on 2024.  Appointment card give to the Patient.       Ms. Stephenson's vitals were reviewed prior to and after treatment.   Patient Vitals for the past 12 hrs:   Temp Pulse Resp BP SpO2   24 1037 -- 66 16 (!) 141/74 --   24 0905 98 °F (36.7 °C) 76 18 (!) 146/82 98 %       Medications given:   Medications Administered         (Belapectin/Placebo) investigational 1 each in sodium chloride 0.9 % 100 mL IVPB Admin Date  2024 Action  New Bag Dose   Rate  100 mL/hr Route  IntraVENous Documented By  Estela Puckett RN        0.9 % sodium chloride infusion Admin Date  2024 Action  New Bag Dose  100 mL/hr Rate  100 mL/hr Route  IntraVENous Documented By  Estela Puckett RN          Ms. Stephenson tolerated the infusion, and had no complaints.    Ms. Stephenson was discharged from [facility] in stable condition.     ESTELA PUCKETT RN  2024  12:12 PM

## 2024-11-04 NOTE — PROGRESS NOTES
a cyst. There is a 5 mm cyst in the right kidney and a 7 mm cyst in the left kidney.   1/2023. Ultrasound of liver.  Stable cirrhosis. Stable hypoechoic lesion in the left liver in keeping with a nonaggressive finding on MRI. No new liver mass. No ascites.  7/2023.  Ultrasound of liver.   Stable cirrhosis.  There is a stable hypoechoic area in the left liver measuring 1 cm in keeping with a nonaggressive finding on prior MRI. There is no other focal liver mass.  No ascites.  2/2024.  Ultrasound of liver.   Echogenic consistent with cirrhosis.  No liver mass lesions.  No dilated bile ducts.    No ascites.  Increased splenomegaly.   6/2024.  Dynamic MRI liver.  Changes consistent with cirrhosis.  No liver mass lesions.  No dilated bile ducts.  No bile duct strictures.  No suspicious liver lesions are noted. There is a T2 hyperintense lesion in the left hepatic lobe which demonstrates slow enhancement and homogeneous enhancement on delayed images. This is unchanged from the prior study and appears to communicate with the left portal vein and may represent a portal varix with slow flow. Small simple hepatic cyst right lobe.  8/2024.  Ultrasound of liver.  Results are consistent with cirrhosis.  No liver mass lesion.  Splenomegaly.      Follow-up per study protocol.    RUKHSANA Payan-JERRI  [address], [address]  [place]  [address] [phone]  [facility]

## 2024-11-07 LAB
AFP L3 MFR SERPL: 6.3 % (ref 0–9.9)
AFP SERPL-MCNC: 9.1 NG/ML (ref 0–9.2)

## 2024-11-18 ENCOUNTER — NURSE ONLY (OUTPATIENT)
Age: 64
End: 2024-11-18

## 2024-11-18 ENCOUNTER — HOSPITAL ENCOUNTER (OUTPATIENT)
Facility: HOSPITAL | Age: 64
Setting detail: INFUSION SERIES
Discharge: HOME | End: 2024-11-18

## 2024-11-18 VITALS
TEMPERATURE: 98 F | HEART RATE: 89 BPM | SYSTOLIC BLOOD PRESSURE: 154 MMHG | BODY MASS INDEX: 28.59 KG/M2 | RESPIRATION RATE: 16 BRPM | WEIGHT: 188.05 LBS | DIASTOLIC BLOOD PRESSURE: 90 MMHG

## 2024-11-18 VITALS
SYSTOLIC BLOOD PRESSURE: 128 MMHG | RESPIRATION RATE: 16 BRPM | OXYGEN SATURATION: 100 % | HEART RATE: 85 BPM | DIASTOLIC BLOOD PRESSURE: 83 MMHG | TEMPERATURE: 97.8 F

## 2024-11-18 DIAGNOSIS — Z00.6 RESEARCH EXAM: Primary | ICD-10-CM

## 2024-11-18 DIAGNOSIS — K75.81 NASH (NONALCOHOLIC STEATOHEPATITIS): Primary | ICD-10-CM

## 2024-11-18 PROCEDURE — 2500000003 HC RX 250 WO HCPCS: Performed by: INTERNAL MEDICINE

## 2024-11-18 PROCEDURE — 2580000003 HC RX 258: Performed by: INTERNAL MEDICINE

## 2024-11-18 PROCEDURE — 96365 THER/PROPH/DIAG IV INF INIT: CPT

## 2024-11-18 RX ORDER — ACETAMINOPHEN 325 MG/1
650 TABLET ORAL
OUTPATIENT
Start: 2024-12-02

## 2024-11-18 RX ORDER — SODIUM CHLORIDE 9 MG/ML
INJECTION, SOLUTION INTRAVENOUS CONTINUOUS
Status: DISCONTINUED | OUTPATIENT
Start: 2024-11-18 | End: 2024-11-19 | Stop reason: HOSPADM

## 2024-11-18 RX ORDER — HYDROCORTISONE SODIUM SUCCINATE 100 MG/2ML
100 INJECTION INTRAMUSCULAR; INTRAVENOUS
Status: DISCONTINUED | OUTPATIENT
Start: 2024-11-18 | End: 2024-11-19 | Stop reason: HOSPADM

## 2024-11-18 RX ORDER — SODIUM CHLORIDE 9 MG/ML
INJECTION, SOLUTION INTRAVENOUS CONTINUOUS
OUTPATIENT
Start: 2024-12-02

## 2024-11-18 RX ORDER — ACETAMINOPHEN 325 MG/1
650 TABLET ORAL
Status: DISCONTINUED | OUTPATIENT
Start: 2024-11-18 | End: 2024-11-19 | Stop reason: HOSPADM

## 2024-11-18 RX ORDER — DIPHENHYDRAMINE HYDROCHLORIDE 50 MG/ML
50 INJECTION INTRAMUSCULAR; INTRAVENOUS
OUTPATIENT
Start: 2024-12-02

## 2024-11-18 RX ORDER — ONDANSETRON 2 MG/ML
8 INJECTION INTRAMUSCULAR; INTRAVENOUS
Status: DISCONTINUED | OUTPATIENT
Start: 2024-11-18 | End: 2024-11-19 | Stop reason: HOSPADM

## 2024-11-18 RX ORDER — ONDANSETRON 2 MG/ML
8 INJECTION INTRAMUSCULAR; INTRAVENOUS
OUTPATIENT
Start: 2024-12-02

## 2024-11-18 RX ORDER — ALBUTEROL SULFATE 90 UG/1
4 INHALANT RESPIRATORY (INHALATION) PRN
OUTPATIENT
Start: 2024-12-02

## 2024-11-18 RX ORDER — DIPHENHYDRAMINE HYDROCHLORIDE 50 MG/ML
50 INJECTION INTRAMUSCULAR; INTRAVENOUS
Status: DISCONTINUED | OUTPATIENT
Start: 2024-11-18 | End: 2024-11-19 | Stop reason: HOSPADM

## 2024-11-18 RX ORDER — EPINEPHRINE 1 MG/ML
0.3 INJECTION, SOLUTION INTRAMUSCULAR; SUBCUTANEOUS PRN
Status: DISCONTINUED | OUTPATIENT
Start: 2024-11-18 | End: 2024-11-19 | Stop reason: HOSPADM

## 2024-11-18 RX ORDER — EPINEPHRINE 1 MG/ML
0.3 INJECTION, SOLUTION INTRAMUSCULAR; SUBCUTANEOUS PRN
OUTPATIENT
Start: 2024-12-02

## 2024-11-18 RX ORDER — SODIUM CHLORIDE 0.9 % (FLUSH) 0.9 %
10 SYRINGE (ML) INJECTION PRN
Status: DISCONTINUED | OUTPATIENT
Start: 2024-11-18 | End: 2024-11-19 | Stop reason: HOSPADM

## 2024-11-18 RX ORDER — ALBUTEROL SULFATE 90 UG/1
4 INHALANT RESPIRATORY (INHALATION) PRN
Status: DISCONTINUED | OUTPATIENT
Start: 2024-11-18 | End: 2024-11-19 | Stop reason: HOSPADM

## 2024-11-18 RX ORDER — HYDROCORTISONE SODIUM SUCCINATE 100 MG/2ML
100 INJECTION INTRAMUSCULAR; INTRAVENOUS
OUTPATIENT
Start: 2024-12-02

## 2024-11-18 RX ADMIN — SODIUM CHLORIDE, PRESERVATIVE FREE 10 ML: 5 INJECTION INTRAVENOUS at 09:12

## 2024-11-18 RX ADMIN — Medication: at 09:30

## 2024-11-18 RX ADMIN — SODIUM CHLORIDE 100 ML/HR: 9 INJECTION, SOLUTION INTRAVENOUS at 09:23

## 2024-11-18 ASSESSMENT — PAIN SCALES - GENERAL: PAINLEVEL_OUTOF10: 0

## 2024-11-18 NOTE — PROGRESS NOTES
[facility] Peds/Adult Note                       Date: 2024    Name: Edda Stephenson    MRN: [id number]         : 1960    0900 Patient arrives for Belapectin/Placebo Research Infusion without acute problems. Please see Epic for complete assessment and education provided. Patient's infusion was not delayed or interrupted.     Vital signs stable throughout and prior to discharge. Patient tolerated procedure well and was discharged without incident.  Patient is aware of next [facility] appointment on 2024.  Appointment card give to the Patient.       Ms. Stephenson's vitals were reviewed prior to and after treatment.   Patient Vitals for the past 12 hrs:   Temp Pulse Resp BP SpO2   24 1036 -- 85 16 128/83 --   24 0900 97.8 °F (36.6 °C) 84 18 (!) 159/88 100 %       Medications given:   Medications Administered         (Belapectin/Placebo) investigational 1 each in sodium chloride 0.9 % 100 mL IVPB Admin Date  2024 Action  New Bag Dose   Rate  100 mL/hr Route  IntraVENous Documented By  Estela Puckett RN        0.9 % sodium chloride infusion Admin Date  2024 Action  New Bag Dose  100 mL/hr Rate  100 mL/hr Route  IntraVENous Documented By  Estela Puckett RN        sodium chloride flush 0.9 % injection 10 mL Admin Date  2024 Action  Given Dose  10 mL Rate   Route  IntraVENous Documented By  Estela Puckett RN          Ms. Stephenson tolerated the infusion, and had no complaints.    Ms. Stephenson was discharged from [facility] in stable condition.       ESTELA PUCKETT RN  2024  11:08 AM

## 2024-11-29 DIAGNOSIS — Z00.6 EXAMINATION OF PARTICIPANT IN CLINICAL TRIAL: Primary | ICD-10-CM

## 2024-12-02 ENCOUNTER — NURSE ONLY (OUTPATIENT)
Age: 64
End: 2024-12-02

## 2024-12-02 ENCOUNTER — HOSPITAL ENCOUNTER (OUTPATIENT)
Facility: HOSPITAL | Age: 64
Setting detail: INFUSION SERIES
Discharge: HOME | End: 2024-12-02

## 2024-12-02 VITALS
TEMPERATURE: 98 F | SYSTOLIC BLOOD PRESSURE: 144 MMHG | OXYGEN SATURATION: 99 % | RESPIRATION RATE: 17 BRPM | HEART RATE: 65 BPM | DIASTOLIC BLOOD PRESSURE: 75 MMHG

## 2024-12-02 VITALS
SYSTOLIC BLOOD PRESSURE: 132 MMHG | TEMPERATURE: 98.4 F | DIASTOLIC BLOOD PRESSURE: 82 MMHG | WEIGHT: 188.05 LBS | HEART RATE: 65 BPM | BODY MASS INDEX: 28.59 KG/M2 | RESPIRATION RATE: 16 BRPM

## 2024-12-02 DIAGNOSIS — K75.81 NASH (NONALCOHOLIC STEATOHEPATITIS): Primary | ICD-10-CM

## 2024-12-02 DIAGNOSIS — Z00.6 RESEARCH EXAM: Primary | ICD-10-CM

## 2024-12-02 PROCEDURE — 2580000003 HC RX 258: Performed by: INTERNAL MEDICINE

## 2024-12-02 PROCEDURE — 96365 THER/PROPH/DIAG IV INF INIT: CPT

## 2024-12-02 PROCEDURE — 2500000003 HC RX 250 WO HCPCS: Performed by: INTERNAL MEDICINE

## 2024-12-02 RX ORDER — SODIUM CHLORIDE 9 MG/ML
5-250 INJECTION, SOLUTION INTRAVENOUS PRN
OUTPATIENT
Start: 2024-12-16

## 2024-12-02 RX ORDER — SODIUM CHLORIDE 9 MG/ML
5-250 INJECTION, SOLUTION INTRAVENOUS PRN
Status: DISCONTINUED | OUTPATIENT
Start: 2024-12-02 | End: 2024-12-03 | Stop reason: HOSPADM

## 2024-12-02 RX ORDER — SODIUM CHLORIDE 9 MG/ML
INJECTION, SOLUTION INTRAVENOUS CONTINUOUS
OUTPATIENT
Start: 2024-12-16

## 2024-12-02 RX ORDER — HYDROCORTISONE SODIUM SUCCINATE 100 MG/2ML
100 INJECTION INTRAMUSCULAR; INTRAVENOUS
OUTPATIENT
Start: 2024-12-16

## 2024-12-02 RX ORDER — ACETAMINOPHEN 325 MG/1
650 TABLET ORAL
OUTPATIENT
Start: 2024-12-16

## 2024-12-02 RX ORDER — DIPHENHYDRAMINE HYDROCHLORIDE 50 MG/ML
50 INJECTION INTRAMUSCULAR; INTRAVENOUS
OUTPATIENT
Start: 2024-12-16

## 2024-12-02 RX ORDER — EPINEPHRINE 1 MG/ML
0.3 INJECTION, SOLUTION INTRAMUSCULAR; SUBCUTANEOUS PRN
OUTPATIENT
Start: 2024-12-16

## 2024-12-02 RX ORDER — ALBUTEROL SULFATE 90 UG/1
4 INHALANT RESPIRATORY (INHALATION) PRN
OUTPATIENT
Start: 2024-12-16

## 2024-12-02 RX ORDER — ONDANSETRON 2 MG/ML
8 INJECTION INTRAMUSCULAR; INTRAVENOUS
OUTPATIENT
Start: 2024-12-16

## 2024-12-02 RX ADMIN — Medication: at 09:51

## 2024-12-02 RX ADMIN — SODIUM CHLORIDE 25 ML/HR: 9 INJECTION, SOLUTION INTRAVENOUS at 09:48

## 2024-12-02 ASSESSMENT — PAIN SCALES - GENERAL: PAINLEVEL_OUTOF10: 0

## 2024-12-02 NOTE — PROGRESS NOTES
[facility] Peds/Adult Note                       Date: 2024    Name: Edda Stephenson    MRN: [id number]         : 1960    0915 Patient arrives for research infusion without acute problems. Please see Epic for complete assessment and education provided.        Vital signs stable throughout and prior to discharge. Patient tolerated procedure well and was discharged without incident.  Edda is aware of next [facility] appointment on 24. Appointment card given.      Ms. Stephenson's vitals were reviewed prior to and after treatment.   Patient Vitals for the past 12 hrs:   Temp Pulse Resp BP SpO2   24 1059 -- 65 17 (!) 144/75 --   24 0915 98 °F (36.7 °C) 69 17 (!) 165/96 99 %         Lab results were obtained and reviewed.  No results found for this or any previous visit (from the past 12 hour(s)).    Medications given:   Medications Administered         (Belapectin/Placebo) investigational 1 each in sodium chloride 0.9 % 100 mL IVPB Admin Date  2024 Action  New Bag Dose   Rate  100 mL/hr Route  IntraVENous Documented By  Angie Garcia, RN        0.9 % sodium chloride infusion Admin Date  2024 Action  New Bag Dose  25 mL/hr Rate  25 mL/hr Route  IntraVENous Documented By  Angie Garcia, RN          Infusion was not delayed or interrupted.    Ms. Stephenson tolerated the infusion, and had no complaints.    Ms. Stephenson was discharged from [facility] in stable condition.     Future Appointments   Date Time Provider Department Center   2024  8:20 AM LIV_[facility]_NURSE_RESEARCH LIVR [facility]   2024  9:00 AM PEDS FASTTRACK 2 [facility]   2024  8:20 AM LIV_[facility]_NURSE_RESEARCH [facility]   2024  9:00 AM PEDS FASTTRACK 2 [facility]   2025  8:20 AM LIV_[facility]_NURSE_RESEARCH L[facility]R [facility]   2025  9:00 AM PEDS FASTTRACK 2 [facility]   2025  8:45 AM Blanche Guallpa APRN - NP LIVR [facility]   2025  9:00 AM PEDS FASTTRACK 2 [facility]  [facility]   2/10/2025  8:40 AM LIV_SM[facility]_NURSE_RESEARCH LIVR [facility]   2/10/2025  9:00 AM PEDS FASTTRACK 1 [facility]   2/24/2025  8:30 AM Blanche Guallpa, APRN - NP LIVR [facility]   2/24/2025  8:45 AM Blanche Guallpa APRN - NP LIVR [facility]   2/24/2025  9:00 AM PEDS FASTTRACK 2 [facility]   3/10/2025  8:40 AM LIV_SM[facility]_NURSE_RESEARCH LIVR [facility]   3/10/2025  9:00 AM PEDS FASTTRACK 2 [facility]   3/24/2025  8:40 AM LIV_SM[facility]_NURSE_RESEARCH LIVR [facility]   3/24/2025  9:00 AM PEDS FASTTRACK 2 [facility]   4/7/2025  8:40 AM LIV_[facility]_NURSE_RESEARCH LIVR [facility]   4/7/2025  9:00 AM PEDS FASTTRACK 2 [facility]       Angie Lopez RN  December 2, 2024  11:54 AM

## 2024-12-16 ENCOUNTER — NURSE ONLY (OUTPATIENT)
Age: 64
End: 2024-12-16

## 2024-12-16 ENCOUNTER — HOSPITAL ENCOUNTER (OUTPATIENT)
Facility: HOSPITAL | Age: 64
Setting detail: INFUSION SERIES
Discharge: HOME | End: 2024-12-16

## 2024-12-16 VITALS
BODY MASS INDEX: 28.39 KG/M2 | TEMPERATURE: 97.9 F | HEART RATE: 75 BPM | DIASTOLIC BLOOD PRESSURE: 83 MMHG | SYSTOLIC BLOOD PRESSURE: 130 MMHG | WEIGHT: 186.73 LBS | RESPIRATION RATE: 16 BRPM

## 2024-12-16 VITALS
RESPIRATION RATE: 16 BRPM | HEART RATE: 78 BPM | SYSTOLIC BLOOD PRESSURE: 132 MMHG | TEMPERATURE: 97.3 F | DIASTOLIC BLOOD PRESSURE: 82 MMHG | OXYGEN SATURATION: 98 %

## 2024-12-16 DIAGNOSIS — Z00.6 RESEARCH EXAM: Primary | ICD-10-CM

## 2024-12-16 DIAGNOSIS — K75.81 NASH (NONALCOHOLIC STEATOHEPATITIS): Primary | ICD-10-CM

## 2024-12-16 PROCEDURE — 96365 THER/PROPH/DIAG IV INF INIT: CPT

## 2024-12-16 PROCEDURE — 2580000003 HC RX 258: Performed by: INTERNAL MEDICINE

## 2024-12-16 PROCEDURE — 2500000003 HC RX 250 WO HCPCS: Performed by: INTERNAL MEDICINE

## 2024-12-16 RX ORDER — ACETAMINOPHEN 325 MG/1
650 TABLET ORAL
OUTPATIENT
Start: 2024-12-30

## 2024-12-16 RX ORDER — HYDROCORTISONE SODIUM SUCCINATE 100 MG/2ML
100 INJECTION INTRAMUSCULAR; INTRAVENOUS
OUTPATIENT
Start: 2024-12-30

## 2024-12-16 RX ORDER — SODIUM CHLORIDE 9 MG/ML
5-250 INJECTION, SOLUTION INTRAVENOUS PRN
Status: DISCONTINUED | OUTPATIENT
Start: 2024-12-16 | End: 2024-12-17 | Stop reason: HOSPADM

## 2024-12-16 RX ORDER — SODIUM CHLORIDE 9 MG/ML
5-250 INJECTION, SOLUTION INTRAVENOUS PRN
OUTPATIENT
Start: 2024-12-30

## 2024-12-16 RX ORDER — ALBUTEROL SULFATE 90 UG/1
4 INHALANT RESPIRATORY (INHALATION) PRN
OUTPATIENT
Start: 2024-12-30

## 2024-12-16 RX ORDER — DIPHENHYDRAMINE HYDROCHLORIDE 50 MG/ML
50 INJECTION INTRAMUSCULAR; INTRAVENOUS
OUTPATIENT
Start: 2024-12-30

## 2024-12-16 RX ORDER — ONDANSETRON 2 MG/ML
8 INJECTION INTRAMUSCULAR; INTRAVENOUS
OUTPATIENT
Start: 2024-12-30

## 2024-12-16 RX ORDER — SODIUM CHLORIDE 9 MG/ML
INJECTION, SOLUTION INTRAVENOUS CONTINUOUS
OUTPATIENT
Start: 2024-12-30

## 2024-12-16 RX ORDER — EPINEPHRINE 1 MG/ML
0.3 INJECTION, SOLUTION INTRAMUSCULAR; SUBCUTANEOUS PRN
OUTPATIENT
Start: 2024-12-30

## 2024-12-16 RX ADMIN — SODIUM CHLORIDE 150 ML/HR: 9 INJECTION, SOLUTION INTRAVENOUS at 09:14

## 2024-12-16 RX ADMIN — Medication: at 09:32

## 2024-12-16 ASSESSMENT — PAIN SCALES - WONG BAKER: WONGBAKER_NUMERICALRESPONSE: NO HURT

## 2024-12-16 ASSESSMENT — PAIN SCALES - GENERAL
PAINLEVEL_OUTOF10: 0
PAINLEVEL_OUTOF10: 0

## 2024-12-16 NOTE — PROGRESS NOTES
[facility] Peds/Adult Note                       Date: 2024    Name: Edda Stephenson    MRN: [id number]         : 1960    0855 Patient arrives for Belapectin/Placebo Research Infusion without acute problems. Please see Epic for complete assessment and education provided. Patient's infusion was not delayed or interrupted.     Vital signs stable throughout and prior to discharge. Patient tolerated procedure well and was discharged without incident.  Patient is aware of next [facility] appointment on 2024.  Appointment card give to the Patient.       Ms. Stephenson's vitals were reviewed prior to and after treatment.   Patient Vitals for the past 12 hrs:   Temp Pulse Resp BP SpO2   24 1041 -- 78 16 132/82 --   24 0855 97.3 °F (36.3 °C) 73 18 99/70 98 %         Medications given:   Medications Administered         (Belapectin/Placebo) investigational 1 each in sodium chloride 0.9 % 100 mL IVPB Admin Date  2024 Action  New Bag Dose   Rate  100 mL/hr Route  IntraVENous Documented By  Estela Puckett RN        0.9 % sodium chloride infusion Admin Date  2024 Action  New Bag Dose  150 mL/hr Rate  150 mL/hr Route  IntraVENous Documented By  Estela Puckett RN          Ms. Stephenson tolerated the infusion, and had no complaints.    Ms. Stephenson was discharged from [facility] in stable condition.     ESTELA PUCKETT RN  2024  11:43 AM

## 2024-12-30 ENCOUNTER — NURSE ONLY (OUTPATIENT)
Age: 64
End: 2024-12-30

## 2024-12-30 ENCOUNTER — HOSPITAL ENCOUNTER (OUTPATIENT)
Facility: HOSPITAL | Age: 64
Setting detail: INFUSION SERIES
Discharge: HOME | End: 2024-12-30

## 2024-12-30 VITALS
RESPIRATION RATE: 18 BRPM | DIASTOLIC BLOOD PRESSURE: 63 MMHG | OXYGEN SATURATION: 98 % | SYSTOLIC BLOOD PRESSURE: 122 MMHG | TEMPERATURE: 97.8 F | HEART RATE: 62 BPM

## 2024-12-30 VITALS
TEMPERATURE: 97.6 F | RESPIRATION RATE: 16 BRPM | SYSTOLIC BLOOD PRESSURE: 135 MMHG | BODY MASS INDEX: 28.63 KG/M2 | WEIGHT: 188.27 LBS | DIASTOLIC BLOOD PRESSURE: 75 MMHG | HEART RATE: 67 BPM

## 2024-12-30 DIAGNOSIS — Z00.6 RESEARCH EXAM: Primary | ICD-10-CM

## 2024-12-30 DIAGNOSIS — K75.81 NASH (NONALCOHOLIC STEATOHEPATITIS): Primary | ICD-10-CM

## 2024-12-30 PROCEDURE — 2580000003 HC RX 258: Performed by: INTERNAL MEDICINE

## 2024-12-30 PROCEDURE — 96365 THER/PROPH/DIAG IV INF INIT: CPT

## 2024-12-30 PROCEDURE — 2500000003 HC RX 250 WO HCPCS: Performed by: INTERNAL MEDICINE

## 2024-12-30 RX ORDER — SODIUM CHLORIDE 9 MG/ML
5-250 INJECTION, SOLUTION INTRAVENOUS PRN
OUTPATIENT
Start: 2025-01-13

## 2024-12-30 RX ORDER — SODIUM CHLORIDE 9 MG/ML
5-250 INJECTION, SOLUTION INTRAVENOUS PRN
Status: DISCONTINUED | OUTPATIENT
Start: 2024-12-30 | End: 2024-12-31 | Stop reason: HOSPADM

## 2024-12-30 RX ORDER — EPINEPHRINE 1 MG/ML
0.3 INJECTION, SOLUTION INTRAMUSCULAR; SUBCUTANEOUS PRN
OUTPATIENT
Start: 2025-01-13

## 2024-12-30 RX ORDER — HYDROCORTISONE SODIUM SUCCINATE 100 MG/2ML
100 INJECTION INTRAMUSCULAR; INTRAVENOUS
OUTPATIENT
Start: 2025-01-13

## 2024-12-30 RX ORDER — ACETAMINOPHEN 325 MG/1
650 TABLET ORAL
OUTPATIENT
Start: 2025-01-13

## 2024-12-30 RX ORDER — DIPHENHYDRAMINE HYDROCHLORIDE 50 MG/ML
50 INJECTION INTRAMUSCULAR; INTRAVENOUS
OUTPATIENT
Start: 2025-01-13

## 2024-12-30 RX ORDER — SODIUM CHLORIDE 9 MG/ML
INJECTION, SOLUTION INTRAVENOUS CONTINUOUS
OUTPATIENT
Start: 2025-01-13

## 2024-12-30 RX ORDER — ALBUTEROL SULFATE 90 UG/1
4 INHALANT RESPIRATORY (INHALATION) PRN
OUTPATIENT
Start: 2025-01-13

## 2024-12-30 RX ORDER — ONDANSETRON 2 MG/ML
8 INJECTION INTRAMUSCULAR; INTRAVENOUS
OUTPATIENT
Start: 2025-01-13

## 2024-12-30 RX ADMIN — SODIUM CHLORIDE 25 ML/HR: 9 INJECTION, SOLUTION INTRAVENOUS at 09:05

## 2024-12-30 RX ADMIN — Medication: at 09:40

## 2024-12-30 ASSESSMENT — PAIN SCALES - GENERAL: PAINLEVEL_OUTOF10: 0

## 2024-12-30 NOTE — PROGRESS NOTES
hospitals Progress Note    Date: 2024    Name: Edda Stephenson    MRN: [id number]         : 1960    0850 - Patient arrives for Belapectin/Placebo Research Infusion without acute problems. Please see Epic for complete assessment and education provided.    Vital signs stable throughout and prior to discharge. Patient tolerated procedure well and was discharged without incident. Patient is aware of next hospitals appointment on 2025. Appointment card give to the Patient.      Ms. Stephenson's vitals were reviewed prior to and after treatment.   Patient Vitals for the past 12 hrs:   Temp Pulse Resp BP SpO2   24 1050 -- 62 18 122/63 --   24 0855 97.8 °F (36.6 °C) 63 18 124/61 98 %     Lab results were reviewed.  No results found for this or any previous visit (from the past 12 hour(s)).    Medications given:   Medications Administered         (Belapectin/Placebo) investigational 1 each in sodium chloride 0.9 % 100 mL IVPB Admin Date  2024 Action  New Bag Dose   Rate  100 mL/hr Route  IntraVENous Documented By  Bere Gavin, RN        0.9 % sodium chloride infusion Admin Date  2024 Action  New Bag Dose  25 mL/hr Rate  25 mL/hr Route  IntraVENous Documented By  Bere Gavin, RN            Ms. Stephenson tolerated the infusion, and had no complaints.    Ms. Stephenson was discharged from [facility] in stable condition. Discharge Instructions provided to patient, patient verbalized understanding but denied the request for a copy of after visit summary.     Future Appointments   Date Time Provider Department Center   2025  8:20 AM LIV_[facility]_NURSE_RESEARCH [facility]   2025  9:00 AM PEDS FASTTRACK 2 [facility]   2025  8:00 AM [facility] US OP 2 [facility]   2025  8:45 AM Blanche Guallpa APRN - NP [facility]   2025  9:00 AM PEDS FASTTRACK 2 [facility]   2/10/2025  8:40 AM LIV_[facility]_NURSE_RESEARCH L[facility]R [facility]   2/10/2025  9:00 AM PEDS

## 2025-01-13 ENCOUNTER — HOSPITAL ENCOUNTER (OUTPATIENT)
Facility: HOSPITAL | Age: 65
Setting detail: INFUSION SERIES
Discharge: HOME | End: 2025-01-13

## 2025-01-13 ENCOUNTER — NURSE ONLY (OUTPATIENT)
Age: 65
End: 2025-01-13

## 2025-01-13 VITALS
SYSTOLIC BLOOD PRESSURE: 143 MMHG | DIASTOLIC BLOOD PRESSURE: 84 MMHG | RESPIRATION RATE: 16 BRPM | HEART RATE: 76 BPM | TEMPERATURE: 98.1 F | WEIGHT: 189.82 LBS | BODY MASS INDEX: 28.86 KG/M2

## 2025-01-13 VITALS
RESPIRATION RATE: 18 BRPM | HEART RATE: 64 BPM | TEMPERATURE: 97.5 F | SYSTOLIC BLOOD PRESSURE: 126 MMHG | OXYGEN SATURATION: 100 % | DIASTOLIC BLOOD PRESSURE: 69 MMHG

## 2025-01-13 DIAGNOSIS — K75.81 NASH (NONALCOHOLIC STEATOHEPATITIS): Primary | ICD-10-CM

## 2025-01-13 DIAGNOSIS — Z00.6 RESEARCH EXAM: Primary | ICD-10-CM

## 2025-01-13 PROCEDURE — 2500000003 HC RX 250 WO HCPCS: Performed by: INTERNAL MEDICINE

## 2025-01-13 PROCEDURE — 96365 THER/PROPH/DIAG IV INF INIT: CPT

## 2025-01-13 PROCEDURE — 2580000003 HC RX 258: Performed by: INTERNAL MEDICINE

## 2025-01-13 RX ORDER — ACETAMINOPHEN 325 MG/1
650 TABLET ORAL
OUTPATIENT
Start: 2025-01-27

## 2025-01-13 RX ORDER — SODIUM CHLORIDE 9 MG/ML
INJECTION, SOLUTION INTRAVENOUS CONTINUOUS
OUTPATIENT
Start: 2025-01-27

## 2025-01-13 RX ORDER — EPINEPHRINE 1 MG/ML
0.3 INJECTION, SOLUTION INTRAMUSCULAR; SUBCUTANEOUS PRN
OUTPATIENT
Start: 2025-01-27

## 2025-01-13 RX ORDER — DIPHENHYDRAMINE HYDROCHLORIDE 50 MG/ML
50 INJECTION INTRAMUSCULAR; INTRAVENOUS
OUTPATIENT
Start: 2025-01-27

## 2025-01-13 RX ORDER — ALBUTEROL SULFATE 90 UG/1
4 INHALANT RESPIRATORY (INHALATION) PRN
OUTPATIENT
Start: 2025-01-27

## 2025-01-13 RX ORDER — SODIUM CHLORIDE 9 MG/ML
5-250 INJECTION, SOLUTION INTRAVENOUS PRN
OUTPATIENT
Start: 2025-01-27

## 2025-01-13 RX ORDER — HYDROCORTISONE SODIUM SUCCINATE 100 MG/2ML
100 INJECTION INTRAMUSCULAR; INTRAVENOUS
OUTPATIENT
Start: 2025-01-27

## 2025-01-13 RX ORDER — ONDANSETRON 2 MG/ML
8 INJECTION INTRAMUSCULAR; INTRAVENOUS
OUTPATIENT
Start: 2025-01-27

## 2025-01-13 RX ORDER — SODIUM CHLORIDE 9 MG/ML
5-250 INJECTION, SOLUTION INTRAVENOUS PRN
Status: DISCONTINUED | OUTPATIENT
Start: 2025-01-13 | End: 2025-01-14 | Stop reason: HOSPADM

## 2025-01-13 RX ADMIN — SODIUM CHLORIDE 200 ML/HR: 9 INJECTION, SOLUTION INTRAVENOUS at 09:02

## 2025-01-13 RX ADMIN — Medication: at 09:20

## 2025-01-13 ASSESSMENT — PAIN SCALES - GENERAL: PAINLEVEL_OUTOF10: 0

## 2025-01-13 ASSESSMENT — PAIN SCALES - WONG BAKER: WONGBAKER_NUMERICALRESPONSE: NO HURT

## 2025-01-13 NOTE — PROGRESS NOTES
[facility] Peds/Adult Note                   Date: 2025    Name: Edda Stephenson    MRN: [id number]         : 1960    0900 Patient arrives for Belapectin/ Placebo Research infusion without acute problems. Please see Epic for complete assessment and education provided.    There was no delay or interruptions to patients infusion today     Vital signs stable throughout and prior to discharge. Patient tolerated procedure well and was discharged without incident.  Patient is aware of next [facility] appointment on 2025. Appointment card give to the Patient.      Ms. Stephenson's vitals were reviewed prior to and after treatment.   Patient Vitals for the past 12 hrs:   Temp Pulse Resp BP SpO2   25 1027 -- 64 18 126/69 --   25 0856 97.5 °F (36.4 °C) 69 18 133/85 100 %       Medications given:   Medications Administered         (Belapectin/Placebo) investigational 1 each in sodium chloride 0.9 % 100 mL IVPB Admin Date  2025 Action  New Bag Dose   Rate  100 mL/hr Route  IntraVENous Documented By  María Daley RN        0.9 % sodium chloride infusion Admin Date  2025 Action  New Bag Dose  200 mL/hr Rate  200 mL/hr Route  IntraVENous Documented By  María Daley, RN              Ms. Stephenson tolerated the infusion, and had no complaints.    Ms. Stephenson was discharged from [facility] in stable condition.     Future Appointments   Date Time Provider Department Center   2025  8:00 AM [facility] US OP 2 [facility]   2025  8:45 AM Blanche Guallpa APRN - NP LIVR [facility]   2025  9:00 AM PEDS FASTTRACK 2 [facility]   2/10/2025  8:40 AM LIV_[facility]_NURSE_RESEARCH LIVR [facility]   2/10/2025  9:00 AM PEDS FASTTRACK 1 [facility]   2025  8:30 AM Blanche Guallpa APRN - NP LIVR [facility]   2025  8:45 AM Blanche Guallpa APRN - NP LIVR [facility]   2025  9:00 AM PEDS FASTTRACK 2 [facility]   3/10/2025  8:40 AM [facility]_NURSE_RESEARCH DI[facility]ONDR [facility]   3/10/2025

## 2025-01-21 DIAGNOSIS — Z00.6 EXAMINATION OF PARTICIPANT IN CLINICAL TRIAL: Primary | ICD-10-CM

## 2025-01-27 ENCOUNTER — HOSPITAL ENCOUNTER (OUTPATIENT)
Facility: HOSPITAL | Age: 65
Discharge: HOME | End: 2025-01-30

## 2025-01-27 ENCOUNTER — HOSPITAL ENCOUNTER (OUTPATIENT)
Facility: HOSPITAL | Age: 65
Setting detail: INFUSION SERIES
Discharge: HOME | End: 2025-01-27

## 2025-01-27 ENCOUNTER — OFFICE VISIT (OUTPATIENT)
Age: 65
End: 2025-01-27

## 2025-01-27 VITALS
OXYGEN SATURATION: 100 % | DIASTOLIC BLOOD PRESSURE: 79 MMHG | HEART RATE: 65 BPM | SYSTOLIC BLOOD PRESSURE: 109 MMHG | TEMPERATURE: 98 F | RESPIRATION RATE: 16 BRPM

## 2025-01-27 VITALS
DIASTOLIC BLOOD PRESSURE: 84 MMHG | TEMPERATURE: 97.9 F | SYSTOLIC BLOOD PRESSURE: 136 MMHG | HEART RATE: 81 BPM | BODY MASS INDEX: 28.79 KG/M2 | RESPIRATION RATE: 16 BRPM | WEIGHT: 189.38 LBS

## 2025-01-27 DIAGNOSIS — K75.81 NASH (NONALCOHOLIC STEATOHEPATITIS): Primary | ICD-10-CM

## 2025-01-27 DIAGNOSIS — Z00.6 RESEARCH EXAM: Primary | ICD-10-CM

## 2025-01-27 DIAGNOSIS — Z00.6 EXAMINATION OF PARTICIPANT IN CLINICAL TRIAL: ICD-10-CM

## 2025-01-27 LAB
EKG ATRIAL RATE: 79 BPM
EKG DIAGNOSIS: NORMAL
EKG P AXIS: 70 DEGREES
EKG P-R INTERVAL: 158 MS
EKG Q-T INTERVAL: 404 MS
EKG QRS DURATION: 76 MS
EKG QTC CALCULATION (BAZETT): 463 MS
EKG R AXIS: 5 DEGREES
EKG T AXIS: 37 DEGREES
EKG VENTRICULAR RATE: 79 BPM

## 2025-01-27 PROCEDURE — 2500000003 HC RX 250 WO HCPCS: Performed by: INTERNAL MEDICINE

## 2025-01-27 PROCEDURE — 96365 THER/PROPH/DIAG IV INF INIT: CPT

## 2025-01-27 PROCEDURE — 99213 OFFICE O/P EST LOW 20 MIN: CPT | Performed by: NURSE PRACTITIONER

## 2025-01-27 PROCEDURE — 2580000003 HC RX 258: Performed by: INTERNAL MEDICINE

## 2025-01-27 PROCEDURE — 76700 US EXAM ABDOM COMPLETE: CPT

## 2025-01-27 PROCEDURE — 93005 ELECTROCARDIOGRAM TRACING: CPT

## 2025-01-27 RX ORDER — HYDROCORTISONE SODIUM SUCCINATE 100 MG/2ML
100 INJECTION INTRAMUSCULAR; INTRAVENOUS
Status: CANCELLED | OUTPATIENT
Start: 2025-02-10

## 2025-01-27 RX ORDER — SODIUM CHLORIDE 9 MG/ML
5-250 INJECTION, SOLUTION INTRAVENOUS PRN
Status: CANCELLED | OUTPATIENT
Start: 2025-02-10

## 2025-01-27 RX ORDER — ACETAMINOPHEN 325 MG/1
650 TABLET ORAL
Status: CANCELLED | OUTPATIENT
Start: 2025-02-10

## 2025-01-27 RX ORDER — DIPHENHYDRAMINE HYDROCHLORIDE 50 MG/ML
50 INJECTION INTRAMUSCULAR; INTRAVENOUS
Status: CANCELLED | OUTPATIENT
Start: 2025-02-10

## 2025-01-27 RX ORDER — DIPHENHYDRAMINE HYDROCHLORIDE 50 MG/ML
50 INJECTION INTRAMUSCULAR; INTRAVENOUS
Status: DISCONTINUED | OUTPATIENT
Start: 2025-01-27 | End: 2025-01-28 | Stop reason: HOSPADM

## 2025-01-27 RX ORDER — ALBUTEROL SULFATE 90 UG/1
4 INHALANT RESPIRATORY (INHALATION) PRN
Status: CANCELLED | OUTPATIENT
Start: 2025-02-10

## 2025-01-27 RX ORDER — HYDROCORTISONE SODIUM SUCCINATE 100 MG/2ML
100 INJECTION INTRAMUSCULAR; INTRAVENOUS
Status: DISCONTINUED | OUTPATIENT
Start: 2025-01-27 | End: 2025-01-28 | Stop reason: HOSPADM

## 2025-01-27 RX ORDER — ALBUTEROL SULFATE 90 UG/1
4 INHALANT RESPIRATORY (INHALATION) PRN
Status: DISCONTINUED | OUTPATIENT
Start: 2025-01-27 | End: 2025-01-28 | Stop reason: HOSPADM

## 2025-01-27 RX ORDER — EPINEPHRINE 1 MG/ML
0.3 INJECTION, SOLUTION INTRAMUSCULAR; SUBCUTANEOUS PRN
Status: CANCELLED | OUTPATIENT
Start: 2025-02-10

## 2025-01-27 RX ORDER — EPINEPHRINE 1 MG/ML
0.3 INJECTION, SOLUTION INTRAMUSCULAR; SUBCUTANEOUS PRN
Status: DISCONTINUED | OUTPATIENT
Start: 2025-01-27 | End: 2025-01-28 | Stop reason: HOSPADM

## 2025-01-27 RX ORDER — ONDANSETRON 2 MG/ML
8 INJECTION INTRAMUSCULAR; INTRAVENOUS
Status: CANCELLED | OUTPATIENT
Start: 2025-02-10

## 2025-01-27 RX ORDER — SODIUM CHLORIDE 9 MG/ML
INJECTION, SOLUTION INTRAVENOUS CONTINUOUS
Status: DISCONTINUED | OUTPATIENT
Start: 2025-01-27 | End: 2025-01-28 | Stop reason: HOSPADM

## 2025-01-27 RX ORDER — SODIUM CHLORIDE 9 MG/ML
5-250 INJECTION, SOLUTION INTRAVENOUS PRN
Status: DISCONTINUED | OUTPATIENT
Start: 2025-01-27 | End: 2025-01-28 | Stop reason: HOSPADM

## 2025-01-27 RX ORDER — SODIUM CHLORIDE 9 MG/ML
INJECTION, SOLUTION INTRAVENOUS CONTINUOUS
Status: CANCELLED | OUTPATIENT
Start: 2025-02-10

## 2025-01-27 RX ORDER — ACETAMINOPHEN 325 MG/1
650 TABLET ORAL
Status: DISCONTINUED | OUTPATIENT
Start: 2025-01-27 | End: 2025-01-28 | Stop reason: HOSPADM

## 2025-01-27 RX ORDER — ONDANSETRON 2 MG/ML
8 INJECTION INTRAMUSCULAR; INTRAVENOUS
Status: DISCONTINUED | OUTPATIENT
Start: 2025-01-27 | End: 2025-01-28 | Stop reason: HOSPADM

## 2025-01-27 RX ADMIN — SODIUM CHLORIDE 200 ML/HR: 9 INJECTION, SOLUTION INTRAVENOUS at 12:59

## 2025-01-27 RX ADMIN — Medication: at 13:04

## 2025-01-27 ASSESSMENT — PAIN SCALES - GENERAL
PAINLEVEL_OUTOF10: 0
PAINLEVEL_OUTOF10: 0

## 2025-01-27 ASSESSMENT — PAIN SCALES - WONG BAKER
WONGBAKER_NUMERICALRESPONSE: NO HURT
WONGBAKER_NUMERICALRESPONSE: NO HURT

## 2025-01-30 DIAGNOSIS — Z00.6 EXAMINATION OF PARTICIPANT IN CLINICAL TRIAL: Primary | ICD-10-CM

## 2025-02-24 ENCOUNTER — OFFICE VISIT (OUTPATIENT)
Age: 65
End: 2025-02-24

## 2025-02-24 VITALS
BODY MASS INDEX: 29.06 KG/M2 | WEIGHT: 191.14 LBS | DIASTOLIC BLOOD PRESSURE: 85 MMHG | RESPIRATION RATE: 16 BRPM | TEMPERATURE: 98 F | SYSTOLIC BLOOD PRESSURE: 131 MMHG | HEART RATE: 76 BPM

## 2025-02-24 DIAGNOSIS — K74.69 OTHER CIRRHOSIS OF LIVER (HCC): Primary | ICD-10-CM

## 2025-02-24 DIAGNOSIS — Z00.6 RESEARCH EXAM: ICD-10-CM

## 2025-02-24 DIAGNOSIS — K75.81 METABOLIC DYSFUNCTION-ASSOCIATED STEATOHEPATITIS (MASH): ICD-10-CM

## 2025-02-24 PROCEDURE — 99213 OFFICE O/P EST LOW 20 MIN: CPT | Performed by: NURSE PRACTITIONER

## 2025-02-24 NOTE — PROGRESS NOTES
[facility]      Chase Degroot MD, FACP, FACG, FAASLD      Azalia Shetty, CLEO Romeo, [facility]   Blanche Zieglerstevie, [facility]   Bell Paez, FNP-C   Carlos Alba, FNP-C    Beverly Desir, [facility]    at [facility] [place][note 2]    [address], [address]    [place]  [address] [phone]    FAX: [phone]   [facility]    at [facility]    [address], [address]    [place]  [address] [phone]    FAX: [phone]       HEPATOLOGY CLINICAL RESEARCH NOTE    Edda Stephenson is a 64 y.o. 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    The patient was seen today for stage 2/phase 3 end of study visit for the [facility] clinical trial for patients with ZAFAR cirrhosis. The study is an infused therapy of Belapectin (GR-[id number]) versus placebo given every 2 weeks and evaluating is effects on fibrosis.      Today's visit was conducted per the study protocol.    The patient was asked if any symptoms, side effects or adverse events have occurred since the last study visit.     A physical examination was performed.      All symptoms reported by the patient and the findings of the physical examination were recorded in the clinical trial documents.    Symptoms of clinical significance were:  None    Findings on physical examination of clinical significance were: None    Patient has underlying cirrhosis and is in need of HCC screening. HCC screening is being performed at appropriate intervals as part of the clinical trial. The most recent imaging was performed with ultrasound in 1/2025, results were negative. AFP normal 11/2024.     HISTOLOGY:  1/2022.  FibroScan performed at [facility]. EkPa was 30.2.  IQR/med 12%.  . The

## 2025-05-19 ENCOUNTER — OFFICE VISIT (OUTPATIENT)
Age: 65
End: 2025-05-19
Payer: OTHER GOVERNMENT

## 2025-05-19 VITALS
BODY MASS INDEX: 29.07 KG/M2 | OXYGEN SATURATION: 99 % | SYSTOLIC BLOOD PRESSURE: 144 MMHG | HEART RATE: 86 BPM | DIASTOLIC BLOOD PRESSURE: 77 MMHG | HEIGHT: 68 IN | WEIGHT: 191.8 LBS | TEMPERATURE: 98.4 F

## 2025-05-19 DIAGNOSIS — K76.6 PORTAL HYPERTENSIVE GASTROPATHY (HCC): ICD-10-CM

## 2025-05-19 DIAGNOSIS — K31.89 PORTAL HYPERTENSIVE GASTROPATHY (HCC): ICD-10-CM

## 2025-05-19 DIAGNOSIS — K74.69 OTHER CIRRHOSIS OF LIVER (HCC): ICD-10-CM

## 2025-05-19 DIAGNOSIS — K75.81 NASH (NONALCOHOLIC STEATOHEPATITIS): Primary | ICD-10-CM

## 2025-05-19 PROCEDURE — 99214 OFFICE O/P EST MOD 30 MIN: CPT | Performed by: NURSE PRACTITIONER

## 2025-05-19 ASSESSMENT — PATIENT HEALTH QUESTIONNAIRE - PHQ9
SUM OF ALL RESPONSES TO PHQ QUESTIONS 1-9: 0
SUM OF ALL RESPONSES TO PHQ QUESTIONS 1-9: 0
2. FEELING DOWN, DEPRESSED OR HOPELESS: NOT AT ALL
DEPRESSION UNABLE TO ASSESS: FUNCTIONAL CAPACITY MOTIVATION LIMITS ACCURACY
SUM OF ALL RESPONSES TO PHQ QUESTIONS 1-9: 0
SUM OF ALL RESPONSES TO PHQ QUESTIONS 1-9: 0
1. LITTLE INTEREST OR PLEASURE IN DOING THINGS: NOT AT ALL

## 2025-05-19 NOTE — PROGRESS NOTES
[facility] LIVER [facility]     Chase Degroot MD, FACP, MACG, FAASLD   MD Azalia Mccain, CLEO Romeo, [facility]-BC   Blanche Zieglerstevie, [facility]  Carlos Parth, FNP-C   Beverly Thang, [facility]-BC         Liver [facility]   [address], [address]   [place]  [address] [phone]   FAX: [phone]  [facility]   [address], [address]   [place]  [address] [phone]   FAX: [phone]       Patient Care Team:  Cristopher Ac MD as PCP - General      Patient Active Problem List   Diagnosis    ZAFAR (nonalcoholic steatohepatitis)    H/O arthroscopic knee surgery    H/O cervical spine surgery    Examination of participant in clinical trial    Secondary esophageal varices without bleeding (HCC)    Portal hypertensive gastropathy (HCC)       Edda Stephenson is being seen at [facility] of cirrhosis secondary to Metabolic Associated SteatoHepatitis (MASH).  The active problem list, all pertinent past medical history, medications, liver histology, endoscopic studies, radiologic findings and laboratory findings related to the liver disorder were reviewed and discussed with the patient.      The patient is a 64 y.o. female who has a longstanding history of elevated liver enzymes that to be secondary to ZAFAR.  She was found to have cirrhosis on FibroScan performed in 1/2022.    She has just completed the [facility] clinical trial for patients with ZAFAR cirrhosis utilizing an infused therapy of Belapectin (GR-MD-02).     A liver biopsy was performed in 2014.   This demonstrated portal fibrosis.   Fibroscan in 1/2022 was consistent with cirrhosis.      Serologic evaluation for markers of chronic liver disease was negative.    The patient has developed the following complications of cirrhosis:

## 2025-05-19 NOTE — PROGRESS NOTES
Chief Complaint   Patient presents with    Follow-up     N/A     Vitals:    05/19/25 1500   BP: (!) 144/77   BP Site: Left Upper Arm   Patient Position: Sitting   Pulse: 86   Temp: 98.4 °F (36.9 °C)   TempSrc: Temporal   SpO2: 99%   Weight: 87 kg (191 lb 12.8 oz)   Height: 1.727 m (5' 8\")     .  \"Have you been to the ER, urgent care clinic since your last visit?  Hospitalized since your last visit?\"    NO    “Have you seen or consulted any other health care providers outside of [facility] since your last visit?”    NO    Have you had a mammogram?”   NO    No breast cancer screening on file      “Have you had a pap smear?”    NO    No cervical cancer screening on file         “Have you had a colorectal cancer screening such as a colonoscopy/FIT/Cologuard?    NO    No colonoscopy on file  No cologuard on file  No FIT/FOBT on file   No flexible sigmoidoscopy on file         Click Here for Release of Records Request

## 2025-05-20 LAB
ALBUMIN SERPL-MCNC: 3.9 G/DL (ref 3.5–5)
ALBUMIN/GLOB SERPL: 1 (ref 1.1–2.2)
ALP SERPL-CCNC: 126 U/L (ref 45–117)
ALT SERPL-CCNC: 68 U/L (ref 12–78)
ANION GAP SERPL CALC-SCNC: 6 MMOL/L (ref 2–12)
AST SERPL-CCNC: 84 U/L (ref 15–37)
BASOPHILS # BLD: 0.01 K/UL (ref 0–0.1)
BASOPHILS NFR BLD: 0.6 % (ref 0–1)
BILIRUB DIRECT SERPL-MCNC: 0.5 MG/DL (ref 0–0.2)
BILIRUB SERPL-MCNC: 1.5 MG/DL (ref 0.2–1)
BUN SERPL-MCNC: 14 MG/DL (ref 6–20)
BUN/CREAT SERPL: 13 (ref 12–20)
CALCIUM SERPL-MCNC: 9.3 MG/DL (ref 8.5–10.1)
CHLORIDE SERPL-SCNC: 104 MMOL/L (ref 97–108)
CO2 SERPL-SCNC: 26 MMOL/L (ref 21–32)
CREAT SERPL-MCNC: 1.07 MG/DL (ref 0.55–1.02)
DIFFERENTIAL METHOD BLD: ABNORMAL
EOSINOPHIL # BLD: 0.02 K/UL (ref 0–0.4)
EOSINOPHIL NFR BLD: 1.2 % (ref 0–7)
ERYTHROCYTE [DISTWIDTH] IN BLOOD BY AUTOMATED COUNT: 14.6 % (ref 11.5–14.5)
GLOBULIN SER CALC-MCNC: 3.9 G/DL (ref 2–4)
GLUCOSE SERPL-MCNC: 100 MG/DL (ref 65–100)
HCT VFR BLD AUTO: 42.6 % (ref 35–47)
HGB BLD-MCNC: 14 G/DL (ref 11.5–16)
IMM GRANULOCYTES # BLD AUTO: 0 K/UL (ref 0–0.04)
IMM GRANULOCYTES NFR BLD AUTO: 0 % (ref 0–0.5)
INR PPP: 1.2 (ref 0.9–1.1)
LYMPHOCYTES # BLD: 0.56 K/UL (ref 0.8–3.5)
LYMPHOCYTES NFR BLD: 32.9 % (ref 12–49)
MCH RBC QN AUTO: 30.6 PG (ref 26–34)
MCHC RBC AUTO-ENTMCNC: 32.9 G/DL (ref 30–36.5)
MCV RBC AUTO: 93.2 FL (ref 80–99)
MONOCYTES # BLD: 0.22 K/UL (ref 0–1)
MONOCYTES NFR BLD: 13.2 % (ref 5–13)
NEUTS SEG # BLD: 0.89 K/UL (ref 1.8–8)
NEUTS SEG NFR BLD: 52.1 % (ref 32–75)
NRBC # BLD: 0 K/UL (ref 0–0.01)
NRBC BLD-RTO: 0 PER 100 WBC
PLATELET # BLD AUTO: 65 K/UL (ref 150–400)
POTASSIUM SERPL-SCNC: 4 MMOL/L (ref 3.5–5.1)
PROT SERPL-MCNC: 7.8 G/DL (ref 6.4–8.2)
PROTHROMBIN TIME: 12.4 SEC (ref 9.2–11.2)
RBC # BLD AUTO: 4.57 M/UL (ref 3.8–5.2)
RBC MORPH BLD: ABNORMAL
SODIUM SERPL-SCNC: 136 MMOL/L (ref 136–145)
WBC # BLD AUTO: 1.7 K/UL (ref 3.6–11)

## 2025-05-22 ENCOUNTER — RESULTS FOLLOW-UP (OUTPATIENT)
Age: 65
End: 2025-05-22

## 2025-06-01 LAB
AFP L3 MFR SERPL: 6.5 % (ref 0–9.9)
AFP SERPL-MCNC: 11.6 NG/ML (ref 0–9.2)

## 2025-06-02 NOTE — PROGRESS NOTES
[facility]      Chase Degroot MD, FACP, FACG, FAASLD      CLEO Draper, [facility]   Blanche Zieglerstevie, [facility]   Bell Paez, FNP-C   Carlos Alba, FNP-C    Beverly Desir, [facility]    at [facility]    [address], [address]    [place]  [address] [phone]    FAX: [phone]   [facility]    at [facility]    [address]    [place]  [address] [phone]    FAX: [phone]       HEPATOLOGY CLINICAL RESEARCH NOTE    Edda Stephenson is a 64 y.o. female with ZAFAR cirrhosis.    The patient was seen today for stage 2/phase 3 infusion #25 visit for the [facility] clinical trial for patients with ZAFAR cirrhosis. The study is an infused therapy of Belapectin (GR-MD-02) versus placebo given every 2 weeks and evaluating is effects on fibrosis.      Today's visit was conducted per the study protocol.    The patient was asked if any symptoms, side effects or adverse events have occurred since the last study visit.     A physical examination was performed.      All symptoms reported by the patient and the findings of the physical examination were recorded in the clinical trial documents.    Symptoms of clinical significance were:  None    Findings on physical examination of clinical significance were: None    Patient has underlying cirrhosis and is in need of HCC screening. HCC screening is being performed at appropriate intervals as part of the clinical trial. The most recent imaging was performed with ultrasound today, results are pending. AFP normal 11/2024.     HISTOLOGY:  1/2022.  FibroScan performed at [facility]. EkPa was 30.2.  IQR/med 12%.  . The results suggested a fibrosis level of F4. The CAP score suggests there is no  Follow up before [place]  Follow up with Dr Carias

## 2025-06-04 ENCOUNTER — CLINICAL DOCUMENTATION (OUTPATIENT)
Age: 65
End: 2025-06-04

## 2025-06-04 NOTE — PROGRESS NOTES
Reviewed patient chart for participation in [id number] clinical trial. Due to lack of metabolic risk factors subject is not eligible for screening at this time.

## 2025-07-13 PROBLEM — K74.69 OTHER CIRRHOSIS OF LIVER (HCC): Status: ACTIVE | Noted: 2025-07-13

## 2025-07-17 ENCOUNTER — TELEPHONE (OUTPATIENT)
Age: 65
End: 2025-07-17

## 2025-07-17 NOTE — TELEPHONE ENCOUNTER
7/17/25 10:09am: Spoke with pt. Date, time, location and prep instructions given for scheduled EGD on 7/24/25. Pt verbalized understanding of above. SQ

## 2025-07-24 ENCOUNTER — HOSPITAL ENCOUNTER (OUTPATIENT)
Facility: HOSPITAL | Age: 65
Setting detail: OUTPATIENT SURGERY
Discharge: HOME | End: 2025-07-24
Attending: INTERNAL MEDICINE | Admitting: INTERNAL MEDICINE
Payer: MEDICARE

## 2025-07-24 ENCOUNTER — HOSPITAL ENCOUNTER (OUTPATIENT)
Facility: HOSPITAL | Age: 65
Discharge: HOME | End: 2025-07-27
Payer: MEDICARE

## 2025-07-24 ENCOUNTER — ANESTHESIA EVENT (OUTPATIENT)
Facility: HOSPITAL | Age: 65
End: 2025-07-24
Payer: MEDICARE

## 2025-07-24 ENCOUNTER — ANESTHESIA (OUTPATIENT)
Facility: HOSPITAL | Age: 65
End: 2025-07-24
Payer: MEDICARE

## 2025-07-24 VITALS
TEMPERATURE: 97.5 F | SYSTOLIC BLOOD PRESSURE: 145 MMHG | DIASTOLIC BLOOD PRESSURE: 78 MMHG | WEIGHT: 190 LBS | HEIGHT: 68 IN | OXYGEN SATURATION: 98 % | BODY MASS INDEX: 28.79 KG/M2 | RESPIRATION RATE: 15 BRPM | HEART RATE: 67 BPM

## 2025-07-24 DIAGNOSIS — K75.81 NASH (NONALCOHOLIC STEATOHEPATITIS): ICD-10-CM

## 2025-07-24 DIAGNOSIS — K74.69 OTHER CIRRHOSIS OF LIVER (HCC): ICD-10-CM

## 2025-07-24 PROCEDURE — 2580000003 HC RX 258: Performed by: NURSE ANESTHETIST, CERTIFIED REGISTERED

## 2025-07-24 PROCEDURE — 76705 ECHO EXAM OF ABDOMEN: CPT

## 2025-07-24 PROCEDURE — 43235 EGD DIAGNOSTIC BRUSH WASH: CPT | Performed by: INTERNAL MEDICINE

## 2025-07-24 PROCEDURE — 7100000010 HC PHASE II RECOVERY - FIRST 15 MIN: Performed by: INTERNAL MEDICINE

## 2025-07-24 PROCEDURE — 7100000011 HC PHASE II RECOVERY - ADDTL 15 MIN: Performed by: INTERNAL MEDICINE

## 2025-07-24 PROCEDURE — 6360000002 HC RX W HCPCS: Performed by: NURSE ANESTHETIST, CERTIFIED REGISTERED

## 2025-07-24 PROCEDURE — 3700000001 HC ADD 15 MINUTES (ANESTHESIA): Performed by: INTERNAL MEDICINE

## 2025-07-24 PROCEDURE — 2720000010 HC SURG SUPPLY STERILE: Performed by: INTERNAL MEDICINE

## 2025-07-24 PROCEDURE — 3600007502: Performed by: INTERNAL MEDICINE

## 2025-07-24 PROCEDURE — 3700000000 HC ANESTHESIA ATTENDED CARE: Performed by: INTERNAL MEDICINE

## 2025-07-24 PROCEDURE — 3600007512: Performed by: INTERNAL MEDICINE

## 2025-07-24 RX ORDER — FENTANYL CITRATE 50 UG/ML
25 INJECTION, SOLUTION INTRAMUSCULAR; INTRAVENOUS AS NEEDED
Status: DISCONTINUED | OUTPATIENT
Start: 2025-07-24 | End: 2025-07-24 | Stop reason: HOSPADM

## 2025-07-24 RX ORDER — SODIUM CHLORIDE 9 MG/ML
INJECTION, SOLUTION INTRAVENOUS PRN
Status: DISCONTINUED | OUTPATIENT
Start: 2025-07-24 | End: 2025-07-24 | Stop reason: HOSPADM

## 2025-07-24 RX ORDER — SODIUM CHLORIDE 0.9 % (FLUSH) 0.9 %
5-40 SYRINGE (ML) INJECTION PRN
Status: DISCONTINUED | OUTPATIENT
Start: 2025-07-24 | End: 2025-07-24 | Stop reason: HOSPADM

## 2025-07-24 RX ORDER — SODIUM CHLORIDE 9 MG/ML
INJECTION, SOLUTION INTRAVENOUS
Status: DISCONTINUED | OUTPATIENT
Start: 2025-07-24 | End: 2025-07-24 | Stop reason: SDUPTHER

## 2025-07-24 RX ORDER — LIDOCAINE HYDROCHLORIDE 20 MG/ML
INJECTION, SOLUTION EPIDURAL; INFILTRATION; INTRACAUDAL; PERINEURAL
Status: DISCONTINUED | OUTPATIENT
Start: 2025-07-24 | End: 2025-07-24 | Stop reason: SDUPTHER

## 2025-07-24 RX ORDER — SODIUM CHLORIDE 0.9 % (FLUSH) 0.9 %
5-40 SYRINGE (ML) INJECTION EVERY 12 HOURS SCHEDULED
Status: DISCONTINUED | OUTPATIENT
Start: 2025-07-24 | End: 2025-07-24 | Stop reason: HOSPADM

## 2025-07-24 RX ORDER — ONDANSETRON 2 MG/ML
2 INJECTION INTRAMUSCULAR; INTRAVENOUS AS NEEDED
Status: DISCONTINUED | OUTPATIENT
Start: 2025-07-24 | End: 2025-07-24 | Stop reason: HOSPADM

## 2025-07-24 RX ADMIN — PROPOFOL 50 MG: 10 INJECTION, EMULSION INTRAVENOUS at 13:57

## 2025-07-24 RX ADMIN — PROPOFOL 100 MG: 10 INJECTION, EMULSION INTRAVENOUS at 13:54

## 2025-07-24 RX ADMIN — SODIUM CHLORIDE: 9 INJECTION, SOLUTION INTRAVENOUS at 13:50

## 2025-07-24 RX ADMIN — LIDOCAINE HYDROCHLORIDE 50 MG: 20 INJECTION, SOLUTION EPIDURAL; INFILTRATION; INTRACAUDAL; PERINEURAL at 13:54

## 2025-07-24 RX ADMIN — PROPOFOL 50 MG: 10 INJECTION, EMULSION INTRAVENOUS at 13:55

## 2025-07-24 ASSESSMENT — PAIN - FUNCTIONAL ASSESSMENT: PAIN_FUNCTIONAL_ASSESSMENT: 0-10

## 2025-07-24 NOTE — DISCHARGE INSTRUCTIONS
[facility]  [address], [address]  [place] [id number] [phone]  FAX: [phone]     Chase Degroot MD, FACP, [facility], FAAS   MD Azalia Mccain, CLEO Romeo, [facility]-BC   Blanche Zieglerstevie, ACNPC-A    Liver Transplant and Liver Cancer Coordination:   Lakeshia Santiago, CLARIBEL Dumont, CLARIBEL Forbes, CLARIBEL    Clinical Research Coordination:   Azalia Ibrahim, CLARIBEL Avitia, CLARIBEL Saavedra, CLARIBEL Oakley, CLARIBEL       ENDOSCOPY DISCHARGE INSTRUCTIONS      Edda F Sachin  1960  Date: 7/24/2025    DISCOMFORT:  Use lozenges or warm salt water gargle for sore thoat  Apply warm compress to IV site if red.  If redness or soreness persists call the office.  You may experience gas and bloating.  Walking and belching will help relieve this.  You may experience chest discomfort or pressure especially if banding of esophageal varices was performed.    DIET:  You may resume your normal diet.    ACTIVITY:  Spend the remainder of the day resting.  Avoid any strenuous activity.  You may not operate a vehicle for 12 hours.  You may not engage in an occupation involving machinery or appliances for rest of today.   Avoid making any critical or financial decisions for at least 24 hour.    Call the [facility] Office if you have any of the following:  Increasing chest or abdominal pain, nausea, vomiting, vomiting blood, abdominal distension or swelling, fever or chills, bloody discharge from nose or mouth or shortness of breath.    Follow-up Instructions:  Call Dr. Degroot for any questions or problems at the phone number listed above.  If a biopsy was performed, you will be contacted by the office staff or Dr Degroot within 1 week.   If you have not heard from us by then you may call the office at the phone number listed above to inquire about the results.    ENDOSCOPY FINDINGS:  Your endoscopy

## 2025-07-24 NOTE — H&P
[facility]  [address]  [place]  [address] [phone]  FAX: [phone]     Chase Degroot MD, FACP, MACG, FAASLD   MD Azalia Mccain, CLEO Romeo, [facility]   Blancheyohannes Guallpa, ACNPC-A    Liver Transplant and Liver Cancer Coordination:   Lakeshia Santiago, CLARIBEL Dumont, CLARIBEL Forbes, CLARIBEL    Clinical Research Coordination:   Azalia Ibrahim, CLARIBEL Avitia, CLARIBEL Saavedra, CLARIBEL Oakley, CLARIBEL       PRE-PROCEDURE NOTE - EGD    H and P from last office visit reviewed.    Allergies reviewed.  Out-patient medicaton list reviewed.    Patient Active Problem List   Diagnosis    ZAFAR (nonalcoholic steatohepatitis)    H/O arthroscopic knee surgery    H/O cervical spine surgery    Examination of participant in clinical trial    Secondary esophageal varices without bleeding (HCC)    Portal hypertensive gastropathy (HCC)    Other cirrhosis of liver (HCC)         Allergies   Allergen Reactions    Codeine Nausea And Vomiting and Other (See Comments)     Other reaction(s): GI Intolerance      Sulfa Antibiotics Hives and Rash    Sulfacetamide Sodium-Sulfur Hives    Adhesive Tape Other (See Comments)     bandaids - causes a rash/states she prefers tape       No current facility-administered medications on file prior to encounter.     Current Outpatient Medications on File Prior to Encounter   Medication Sig Dispense Refill    triamcinolone (KENALOG) 0.1 % cream Apply topically 2 times daily as needed Apply topically 2 times daily.      levocetirizine (XYZAL) 5 MG tablet Take 1 tablet by mouth as needed      metroNIDAZOLE (METROCREAM) 0.75 % cream Apply topically as needed      pantoprazole (PROTONIX) 40 MG tablet Take 1 tablet by mouth daily (Patient not taking: Reported on 7/24/2025) 30 tablet 3    meloxicam (MOBIC) 15 MG tablet Take 1 tablet by mouth as needed (Patient not taking: Reported on

## 2025-07-24 NOTE — ANESTHESIA PRE PROCEDURE
Department of Anesthesiology  Preprocedure Note       Name:  Edda Stephenson   Age:  65 y.o.  :  1960                                          MRN:  [id number]         Date:  2025      Surgeon: Surgeon(s):  Chase Degroot MD    Procedure: Procedure(s):  ESOPHAGOGASTRODUODENOSCOPY    Medications prior to admission:   Prior to Admission medications    Medication Sig Start Date End Date Taking? Authorizing Provider   pantoprazole (PROTONIX) 40 MG tablet Take 1 tablet by mouth daily 24   Blanche Guallpa APRN - NP   meloxicam (MOBIC) 15 MG tablet Take 1 tablet by mouth as needed 24   ProviderDenilson MD   triamcinolone (KENALOG) 0.1 % cream Apply topically 2 times daily as needed Apply topically 2 times daily.    ProviderDenilson MD   levocetirizine (XYZAL) 5 MG tablet Take 1 tablet by mouth as needed    Denilson Herrera MD   metroNIDAZOLE (METROCREAM) 0.75 % cream Apply topically as needed 23   ProviderDenilson MD       Current medications:    Current Facility-Administered Medications   Medication Dose Route Frequency Provider Last Rate Last Admin    sodium chloride flush 0.9 % injection 5-40 mL  5-40 mL IntraVENous 2 times per day Chase Degroot MD        sodium chloride flush 0.9 % injection 5-40 mL  5-40 mL IntraVENous PRN Chase Degroot MD        0.9 % sodium chloride infusion   IntraVENous PRN Chase Degroot MD        fentaNYL (SUBLIMAZE) injection 25 mcg  25 mcg IntraVENous PRN Chase Degroot MD        ondansetron (ZOFRAN) injection 2 mg  2 mg IntraVENous PRN Chase Degroot MD           Allergies:    Allergies   Allergen Reactions    Codeine Nausea And Vomiting and Other (See Comments)     Other reaction(s): GI Intolerance      Sulfa Antibiotics Hives and Rash    Sulfacetamide Sodium-Sulfur Hives    Adhesive Tape Other (See Comments)     bandaids - causes a rash/states she prefers tape       Problem List:    Patient

## 2025-07-24 NOTE — ANESTHESIA POSTPROCEDURE EVALUATION
Department of Anesthesiology  Postprocedure Note    Patient: Edda Stephenson  MRN: [id number]  YOB: 1960  Date of evaluation: 7/24/2025    Procedure Summary       Date: 07/24/25 Room / Location: [facility] ENDO 03 / [facility] ENDOSCOPY    Anesthesia Start: 1350 Anesthesia Stop: 1359    Procedure: ESOPHAGOGASTRODUODENOSCOPY (Upper GI Region) Diagnosis:       ZAFAR (nonalcoholic steatohepatitis)      Other cirrhosis of liver (HCC)      (ZAFAR (nonalcoholic steatohepatitis) [K75.81])      (Other cirrhosis of liver (HCC) [K74.69])    Surgeons: Chase Degroot MD Responsible Provider: Francheska Lombardi MD    Anesthesia Type: MAC ASA Status: 2            Anesthesia Type: No value filed.    Sebastián Phase I: Sebastián Score: 10    Sebastián Phase II: Sebastián Score: 9    Anesthesia Post Evaluation    Level of consciousness: awake  Airway patency: patent  Nausea & Vomiting: no nausea  Cardiovascular status: hemodynamically stable  Respiratory status: acceptable  Hydration status: euvolemic  Pain management: adequate    No notable events documented.

## 2025-07-24 NOTE — OP NOTE
LIVER [facility]  [address]  [place], [place]  [id number] [phone]  FAX: [phone]     Chase Degroot MD, FACP, [facility]   MD Azalia Mccain, CLEO Romeo, AGPCNP-BC   Blanche Guallpa, ACNPC-A    Liver Transplant and Liver Cancer Coordination:   Lakeshia Santiago, CLARIBEL Dumont, CLARIBEL Forbes, CLARIBEL    Clinical Research Coordination:   Azalia Ibrahim, CLARIBEL Avitia, CLARIBEL Saavedra, CLARIBEL Oakley, CLARIBEL       UPPER ENDOSCOPY PROCEDURE NOTE    NAME: Edda Stephenson  :  1960  MRN:  [id number]    INDICATION: Cirrhosis.  Screening for esophageal varices with variceal ligation if  indicated.    : Chase Degroot MD    SURGICAL ASSISTANT:  None    PROSTHETIC DEVISES, TISSUE GRAFTS, ORGAN TRANSPLANTS:  Not applicable     ANESTHESIA/SEDATION: MAC Sedation per anesthesiology         PROCEDURE DESCRIPTION:  [employer] consent was obtained from the patient for the procedure.  All risks and benefits of the procedure explained.     The procedure was performed in the endoscopy suite.  The patient was laying on a stretcher and moved to the left lateral decubitus position prior to administration of sedation.    Sedation was administered by anesthesiology.  See their note for details.      The endoscope was inserted into the mouth and advanced under direct vision to the second portion of the duodenum.  Careful inspection of upper gastrointestinal tract was made as the endoscope was inserted and withdrawn.  Retroflexion of the endoscope to view of the cardia of the stomach was performed.  The findings and interventions are described below.      FINDINGS:  Esophagus:    Normal.      Stomach:   Mild portal hypertensive gastropathy of the body of the stomach  No gastric varices identified.    Duodenum:   Normal bulb and second portion    SPECIMENS COLLECTED:

## (undated) DEVICE — ORISE PROKNIFE 1.5 MM ELECTRODE: Brand: ORISE™ PROKNIFE

## (undated) DEVICE — ORISE PROKNIFE 3.0 MM ELECTRODE: Brand: ORISE™ PROKNIFE

## (undated) DEVICE — TUBING IRRIG COMPATIBLE W ERBE MEDIVATOR PMP HYDR

## (undated) DEVICE — FORCEPS BX L240CM JAW DIA2.8MM L CAP W/ NDL MIC MESH TOOTH

## (undated) DEVICE — TUBING HYDR IRR --

## (undated) DEVICE — TRAY BX SFT TISS W/ RUL ALC PREP PD FEN DRP TWL LUERLOCK

## (undated) DEVICE — MAX-CORE® DISPOSABLE CORE BIOPSY INSTRUMENT, 16G X 16CM: Brand: MAX-CORE